# Patient Record
Sex: MALE | Race: WHITE | NOT HISPANIC OR LATINO | Employment: FULL TIME
[De-identification: names, ages, dates, MRNs, and addresses within clinical notes are randomized per-mention and may not be internally consistent; named-entity substitution may affect disease eponyms.]

---

## 2023-06-15 ENCOUNTER — TRANSCRIBE ORDERS (OUTPATIENT)
Dept: OTHER | Age: 48
End: 2023-06-15

## 2023-06-15 ENCOUNTER — TRANSFERRED RECORDS (OUTPATIENT)
Dept: HEALTH INFORMATION MANAGEMENT | Facility: CLINIC | Age: 48
End: 2023-06-15
Payer: COMMERCIAL

## 2023-06-15 ENCOUNTER — MEDICAL CORRESPONDENCE (OUTPATIENT)
Dept: HEALTH INFORMATION MANAGEMENT | Facility: CLINIC | Age: 48
End: 2023-06-15

## 2023-06-15 DIAGNOSIS — H46.9 UNSPECIFIED OPTIC NEURITIS: Primary | ICD-10-CM

## 2023-06-16 ENCOUNTER — TELEPHONE (OUTPATIENT)
Dept: OPHTHALMOLOGY | Facility: CLINIC | Age: 48
End: 2023-06-16

## 2023-06-16 NOTE — TELEPHONE ENCOUNTER
[facility] Call Center    Phone Message    May a detailed message be left on voicemail: yes     Reason for Call: Appointment Intake    Referring Provider Name: Quan Gutierrez MD  Diagnosis and/or Symptoms: Unspecified optic neuritis [H46.9]    Per protocol writer to send te for new patient appointments    Action Taken: Message routed to:  [facility] ([facility] eye    Travel Screening: Not Applicable

## 2023-06-16 NOTE — TELEPHONE ENCOUNTER
Called and LVM     Yony can make an appointment with Dr. Ramon or Dr. Claire for Unspecified optic neuritis     Please update insurance and demographics     Samantha Cabello Communication Facilitator on 6/16/2023 at 11:08 AM

## 2023-07-15 NOTE — PROGRESS NOTES
Yony Loera is a 48 year old male with the following diagnoses:   1. Optic neuropathy    2. Visual field defect    3. Bitemporal hemianopia         Patient was sent for consultation by Dr. Gutierrez for optic neuropathy.     HPI:  Patient states he noticed the central blurring or absent of vision that he noticed at his eye doctor appointment in 1/2023 when he noticed he couldn't read the letter on the left side of the chart with his left eye. Patient denies worsening of symptoms since first noticed in January. Denies eye pain. Patient was referred to for possible  or Macular hole to Dr. Gutierrez in June 2023 who did not believe that patient's symptoms was do to a retina problem, there was no  or Macular hole, or retina related. Referred patient here for possible optic neuropathy. Patient states he had headaches but they were attributed to his sinus congestion. Denies jaw pain, shoulder pain, hip pain, weight loss, or night sweats.  PCP is attempting to get a sinus CT scan to further evaluate for possible sinus infection. Patient also had two episodes of dizziness/vertigo first 10/2022 and 3/2023. He was given meclizine that helped with the second episode of dizziness. No known family hx of ocular dz, no prior eye dz other than needed to wear glasses. No prior ocular surgery. Eats normal balanced diet of meat and vegetables. None smoker. No hx of excessive alcohol intake. Works in [employer] not exposed to any heavy metals in his normal environment.        Independent historians:  Patient    Review of outside testing:  none    My interpretation performed today of outside testing:  None performed         Review of outside clinical notes:    6/15/23 -- Visit with Dr. Gutierrez    Past medical history:  Vertigo         Medications:   Zyrtec   Meclizine       Family history / social history:  Patient's mother -ulcerative colitis, Sister-breast cancer, Father-parkinson's and lewy body disease     Patient denies smoking,  drink's alcohol once weekly at total of 2 beers       Exam:  Visual acuity 20/20 right eye 20/40 left eye.  Color vision Full each eye but difficulty with seeing numbers on the left side.  Pupils no rAPD but sluggish left pupil.  Intraocular pressure wnl each eye.  Anterior segment exam within normal limits for age.  Fundus exam significant for temporal pallor of optic nerve OS.      Tests ordered and interpreted today:  VF: OD superior defect, OS temporal defect that crosses the vertical meridian   OCT RNFL: OD no thinning, OS temporal thinning and nasal GCL loss         Discussion of management / interpretation with another provider:   None    Assessment/Plan:   This patient has optic atrophy LEFT eye.   There is reduced color vision and temporal retinal nerve fiber layer thinning in the LEFT eye.  Interestingly, he has nasal ganglion cell layer  Loss in the RIGHT eye that respects the vertical and diffuse ganglion cell layer  Loss in the LEFT eye. This is suggestive of chiasmal compression most commonly seen in the setting of pituitary adenoma.  I will obtain an MRI.  If this shows a suprasellar lesion then I will refer him to neurosurgery.  If this is normal, then I will obtain bloodwork to look for causes of bilateral optic atrophy.           Attending Physician Attestation:  Complete documentation of historical and exam elements from today's encounter can be found in the full encounter summary report (not reduplicated in this progress note).  I personally obtained the chief complaint(s) and history of present illness.  I confirmed and edited as necessary the review of systems, past medical/surgical history, family history, social history, and examination findings as documented by others; and I examined the patient myself.  I personally reviewed the relevant tests, images, and reports as documented above.  I formulated and edited as necessary the assessment and plan and discussed the findings and management  plan with the patient and family. I personally reviewed the ophthalmic test(s) associated with this encounter, agree with the interpretation(s) as documented by the resident/fellow, and have edited the corresponding report(s) as necessary.  - Ercik Elaine MD   Fellow, Neuro-Ophthalmology

## 2023-07-25 ENCOUNTER — OFFICE VISIT (OUTPATIENT)
Dept: OPHTHALMOLOGY | Facility: CLINIC | Age: 48
End: 2023-07-25
Attending: OPHTHALMOLOGY
Payer: COMMERCIAL

## 2023-07-25 DIAGNOSIS — H53.47 BITEMPORAL HEMIANOPIA: ICD-10-CM

## 2023-07-25 DIAGNOSIS — H53.40 VISUAL FIELD DEFECT: ICD-10-CM

## 2023-07-25 DIAGNOSIS — H46.9 OPTIC NEUROPATHY: Primary | ICD-10-CM

## 2023-07-25 DIAGNOSIS — H53.40 VISUAL FIELD DEFECT: Primary | ICD-10-CM

## 2023-07-25 PROCEDURE — 99205 OFFICE O/P NEW HI 60 MIN: CPT | Mod: GC | Performed by: OPHTHALMOLOGY

## 2023-07-25 PROCEDURE — 92083 EXTENDED VISUAL FIELD XM: CPT | Performed by: OPHTHALMOLOGY

## 2023-07-25 PROCEDURE — 99214 OFFICE O/P EST MOD 30 MIN: CPT | Performed by: OPHTHALMOLOGY

## 2023-07-25 PROCEDURE — 92133 CPTRZD OPH DX IMG PST SGM ON: CPT | Performed by: OPHTHALMOLOGY

## 2023-07-25 RX ORDER — CETIRIZINE HYDROCHLORIDE 10 MG/1
1 TABLET ORAL DAILY PRN
COMMUNITY

## 2023-07-25 ASSESSMENT — REFRACTION_WEARINGRX
OD_AXIS: 137
OD_ADD: +2.00
OD_CYLINDER: +0.75
OS_CYLINDER: +0.50
OD_SPHERE: +2.75
OS_SPHERE: +2.25
OS_AXIS: 022
SPECS_TYPE: PAL
OS_ADD: +2.00

## 2023-07-25 ASSESSMENT — SLIT LAMP EXAM - LIDS
COMMENTS: NORMAL
COMMENTS: NORMAL

## 2023-07-25 ASSESSMENT — CUP TO DISC RATIO
OD_RATIO: 0.3
OS_RATIO: 0.3

## 2023-07-25 ASSESSMENT — CONF VISUAL FIELD
OD_SUPERIOR_NASAL_RESTRICTION: 0
OD_INFERIOR_TEMPORAL_RESTRICTION: 0
OD_NORMAL: 1
OD_SUPERIOR_TEMPORAL_RESTRICTION: 0
OD_INFERIOR_NASAL_RESTRICTION: 0
OS_SUPERIOR_TEMPORAL_RESTRICTION: 2

## 2023-07-25 ASSESSMENT — TONOMETRY
OD_IOP_MMHG: 15
OS_IOP_MMHG: 16
IOP_METHOD: ICARE

## 2023-07-25 ASSESSMENT — VISUAL ACUITY
OS_CC+: -3
OD_CC+: -2
CORRECTION_TYPE: GLASSES
OD_CC: 20/20
OS_CC: 20/40
METHOD: SNELLEN - LINEAR

## 2023-07-25 ASSESSMENT — EXTERNAL EXAM - LEFT EYE: OS_EXAM: NORMAL

## 2023-07-25 ASSESSMENT — EXTERNAL EXAM - RIGHT EYE: OD_EXAM: NORMAL

## 2023-07-25 NOTE — NURSING NOTE
Chief Complaints and History of Present Illnesses   Patient presents with    Optic Neuritis Evaluation     Chief Complaint(s) and History of Present Illness(es)       Optic Neuritis Evaluation               Comments    Pt states vision has been stable since January. No new flashes or floaters.  No eye pain today. No redness or dryness.    SIDRA Vega July 25, 2023 9:04 AM

## 2023-07-25 NOTE — LETTER
2023         RE:  :  MRN: Yony Loera  1975  [id number]     Dear Dr. Gutierrez,    Thank you for asking me to see your very pleasant patient, Yony Loera, in neuro-ophthalmic consultation.  I would like to thank you for sending your records and I have summarized them in the history of present illness.  My assessment and plan are below.  For further details, please see my attached clinic note.      Yony Loera is a 48 year old male with the following diagnoses:   1. Optic neuropathy    2. Visual field defect    3. Bitemporal hemianopia       Patient was sent for consultation by Dr. Gutierrez for optic neuropathy.     HPI: Patient states he noticed the central blurring or absent of vision that he noticed at his eye doctor appointment in 2023 when he noticed he couldn't read the letter on the left side of the chart with his left eye. Patient denies worsening of symptoms since first noticed in January. Denies eye pain. Patient was referred to for possible  or Macular hole to Dr. Gutierrez in 2023 who did not believe that patient's symptoms was do to a retina problem, there was no  or Macular hole, or retina related. Referred patient here for possible optic neuropathy. Patient states he had headaches but they were attributed to his sinus congestion. Denies jaw pain, shoulder pain, hip pain, weight loss, or night sweats.  PCP is attempting to get a sinus CT scan to further evaluate for possible sinus infection. Patient also had two episodes of dizziness/vertigo first 10/2022 and 3/2023. He was given meclizine that helped with the second episode of dizziness. No known family hx of ocular dz, no prior eye dz other than needed to wear glasses. No prior ocular surgery. Eats normal balanced diet of meat and vegetables. None smoker. No hx of excessive alcohol intake. Works in [employer] not exposed to any heavy metals in his normal environment.      Independent historians: Patient    Review of outside  testing: none    My interpretation performed today of outside testing: None performed         Review of outside clinical notes:    6/15/23 -- Visit with Dr. Gutierrez    Past medical history:  Vertigo     Medications:   Zyrtec   Meclizine       Family history / social history:  Patient's mother -ulcerative colitis, Sister-breast cancer, Father-parkinson's and lewy body disease     Patient denies smoking, drink's alcohol once weekly at total of 2 beers     Exam:  Visual acuity 20/20 right eye 20/40 left eye.  Color vision Full each eye but difficulty with seeing numbers on the left side.  Pupils no rAPD but sluggish left pupil.  Intraocular pressure wnl each eye.  Anterior segment exam within normal limits for age.  Fundus exam significant for temporal pallor of optic nerve OS.      Tests ordered and interpreted today:  VF: OD superior defect, OS temporal defect that crosses the vertical meridian   OCT RNFL: OD no thinning, OS temporal thinning and nasal GCL loss         Discussion of management / interpretation with another provider:   None    Assessment/Plan:   This patient has optic atrophy LEFT eye.   There is reduced color vision and temporal retinal nerve fiber layer thinning in the LEFT eye.  Interestingly, he has nasal ganglion cell layer  Loss in the RIGHT eye that respects the vertical and diffuse ganglion cell layer  Loss in the LEFT eye. This is suggestive of chiasmal compression most commonly seen in the setting of pituitary adenoma.  I will obtain an MRI.  If this shows a suprasellar lesion then I will refer him to neurosurgery.  If this is normal, then I will obtain bloodwork to look for causes of bilateral optic atrophy.         Again, thank you for allowing me to participate in the care of your patient.      Sincerely,    Erick Claire MD  Professor  Ophthalmology Residency   Director of Neuro-Ophthalmology  [facility] - [employer] Endowed Chair  Departments of Ophthalmology, Neurology, and  Neurosurgery  [facility] [address] [place]  [address]  T - [phone]  F - [phone]  [email or web address]      CC: Quan Gutierrez MD  [facility]  [address]  [facility] [address]  Via Fax: [phone]    DX = junctional scotoma on ganglion cell layer not visual field

## 2023-08-07 ENCOUNTER — ANCILLARY PROCEDURE (OUTPATIENT)
Dept: MRI IMAGING | Facility: CLINIC | Age: 48
End: 2023-08-07
Attending: OPHTHALMOLOGY
Payer: COMMERCIAL

## 2023-08-07 DIAGNOSIS — H53.40 VISUAL FIELD DEFECT: ICD-10-CM

## 2023-08-07 DIAGNOSIS — H46.9 OPTIC NEUROPATHY: ICD-10-CM

## 2023-08-07 PROCEDURE — 70543 MRI ORBT/FAC/NCK W/O &W/DYE: CPT | Mod: GC | Performed by: RADIOLOGY

## 2023-08-07 PROCEDURE — A9585 GADOBUTROL INJECTION: HCPCS | Mod: JZ | Performed by: RADIOLOGY

## 2023-08-07 RX ORDER — GADOBUTROL 604.72 MG/ML
9.5 INJECTION INTRAVENOUS ONCE
Status: COMPLETED | OUTPATIENT
Start: 2023-08-07 | End: 2023-08-07

## 2023-08-07 RX ADMIN — GADOBUTROL 9.5 ML: 604.72 INJECTION INTRAVENOUS at 17:54

## 2023-08-08 ENCOUNTER — MYC MEDICAL ADVICE (OUTPATIENT)
Dept: OPHTHALMOLOGY | Facility: CLINIC | Age: 48
End: 2023-08-08
Payer: COMMERCIAL

## 2023-08-09 DIAGNOSIS — E23.7 PITUITARY LESION (H): Primary | ICD-10-CM

## 2023-08-09 NOTE — TELEPHONE ENCOUNTER
Spoke to patient.  Dr. Claire has already spoke to patient.   Patient had a few additional questions that I was able to answer for him.  He will let us know if he does not hear from neurosurgery or has additional questions.      Jennifer Wilkins on 8/9/2023 at 2:39 PM

## 2023-08-10 ENCOUNTER — TELEPHONE (OUTPATIENT)
Dept: NEUROSURGERY | Facility: CLINIC | Age: 48
End: 2023-08-10
Payer: COMMERCIAL

## 2023-08-10 NOTE — PROGRESS NOTES
[facility]  Department of Neurosurgery    Name: Yony Loera  MRN: [id number]  : 1975     Reason for visit: Pituitary adenoma, new patient visit      Dear Dr. Castillo and Dr. Claire,    It was a pleasure to see Mr. Loera in the [facility] today as a new patient.  I have reviewed the referral notes and imaging and have summarized our visit here. As you recall, Mr. Loera is a pleasant 48 year-old right-handed male who noticed left peripheral vision loss in the left eye as well as central blurring and an eye doctor appointment in 2023.  He was referred to Dr. Claire for workup of optic neuropathy.  He also reports headaches that were thought to be due to sinus congestion.  Dr. Claire's assessment demonstrated slightly worse visual acuity in the left eye and visual field testing showed a superior defect in the right eye and a temporal defect in the left eye. He reports no thin skin, easy bruising, changes in weight or hair loss, changes in ring or foot size.    Review of Systems:   Pertinent items are noted in HPI or as in patient entered ROS below, remainder of complete ROS is negative.     Medications: cetirizine    Allergies: Patient has no known allergies.      Past Medical History: GERD    Family History: Ulcerative colitis in mother, Parkinson's in father, breast cancer in sister and grandmother    Social History: Works in [employer], lifetime non-smoker,  and one daughter who will be a senior.    Physical Exam:   General: No acute distress.   Head: No signs of trauma.    Eyes: Conjunctivae are normal.  Mouth/Throat: Oropharynx moist.  Neck: Normal range of motion.    Resp: No respiratory distress.   MSK: Moves all extremities.  No obvious deformity.  Neuro: The patient is fully oriented and quite pleasant. Speech normal.  Corrected visual acuity is 20/20 on the right and 20/30 on the left.  Visual fields with  bitemporal hemianopsia.  Extraocular movements are intact without nystagmus. Facial sensation is intact in V1, V2, V3 distributions. Facial nerve function is normal, rated as a House Brackmann 1, without synkinesis.  Palate is symmetric. Shoulders are full strength. Tongue is midline. There is no pronator drift. Full strength in all extremities. Sensation intact throughout. No dysmetria with finger-nose-finger testing.   Psych: Normal mood and affect. Behavior is normal.      Labs:  Pituitary panel  Na 138  Cortisol 9.7 (7:51am)  Prolactin 30 (slight high)  TSH 2.39  FT4 0.79  LH 2.7  FSH 4.8    Imaging:  The MRI of the orbits from 8/7/2023 was reviewed that demonstrates a large lobulated mass in the sphenoid sinus, sella, and suprasellar cistern that measures up to 5.6 cm with mass effect on the optic chiasm and invasion of the bilateral cavernous sinuses.    Assessment:  5.6 cm pituitary adenoma  2.  Left more than right vision loss, bitemporal hemianopsia  3.  Hypothyroidism    Plan:  We reviewed the patient's history, imaging, natural history of pituitary adenomas and expected outcomes of conservative management and intervention. We also discussed the rarer other possibilities for diagnosis. Options include observation, medical management (such as in the case of prolactinomas), radiation, and surgical resection.  We discussed the risks and benefits of each approach as well as the rationale for recommending surgery given the size of the tumor and his visual deficits.  We described the endonasal surgery to resect these tumors, perioperative expectations, and recovery period.    We discussed the risks including bleeding, infection, neurologic injury including vision loss/diplopia/anosmia, carotid injury/stroke, CSF leak, loss or changes in smell and taste, weakness, numbness, subtotal removal, need for additional procedures or operations.  We will refer him to Endocrinology as well  Should he wish to proceed with  surgery, we will refer to our ENT skull base team and PAC  CT/CTA the morning of surgery with fiducials  I encouraged Mr. Loera to contact with any questions or concerns or if we may be of assistance in any way.      It was a pleasure to participate in the care of your patient. Please feel free to contact me at any point if I can be of any assistance for Mr. Loera.    Sincerely,  Sabas Lacy MD       45 minutes spent on the date of the encounter doing chart review, review of outside records, review of test results, interpretation of tests, patient visit, documentation and discussion with other provider(s)

## 2023-08-11 NOTE — TELEPHONE ENCOUNTER
RECORDS RECEIVED FROM: internal   REASON FOR VISIT: Pituitary lesion    Date of Appt: 8/16/23   NOTES (FOR ALL VISITS) STATUS DETAILS   OFFICE NOTE from referring provider Internal Dr Yoel Claire @ [facility]:  7/25/23   OFFICE NOTE from other specialist Received Dr Quan Gutierrez @ [facility]:  6/15/23   MEDICATION LIST Internal    IMAGING  (FOR ALL VISITS)     MRI (HEAD, NECK, SPINE) Internal [facility]:  MRI Orbit 8/7/23

## 2023-08-16 ENCOUNTER — OFFICE VISIT (OUTPATIENT)
Dept: NEUROSURGERY | Facility: CLINIC | Age: 48
End: 2023-08-16
Payer: COMMERCIAL

## 2023-08-16 ENCOUNTER — LAB (OUTPATIENT)
Dept: LAB | Facility: CLINIC | Age: 48
End: 2023-08-16
Payer: COMMERCIAL

## 2023-08-16 ENCOUNTER — MYC MEDICAL ADVICE (OUTPATIENT)
Dept: NEUROSURGERY | Facility: CLINIC | Age: 48
End: 2023-08-16

## 2023-08-16 ENCOUNTER — PRE VISIT (OUTPATIENT)
Dept: NEUROSURGERY | Facility: CLINIC | Age: 48
End: 2023-08-16

## 2023-08-16 ENCOUNTER — PREP FOR PROCEDURE (OUTPATIENT)
Dept: NEUROSURGERY | Facility: CLINIC | Age: 48
End: 2023-08-16

## 2023-08-16 VITALS
DIASTOLIC BLOOD PRESSURE: 84 MMHG | SYSTOLIC BLOOD PRESSURE: 125 MMHG | HEART RATE: 75 BPM | OXYGEN SATURATION: 95 % | RESPIRATION RATE: 16 BRPM

## 2023-08-16 DIAGNOSIS — E23.7 PITUITARY LESION (H): ICD-10-CM

## 2023-08-16 DIAGNOSIS — D35.2 PITUITARY MACROADENOMA (H): Primary | ICD-10-CM

## 2023-08-16 DIAGNOSIS — D35.2 PITUITARY ADENOMA WITH EXTRASELLAR EXTENSION (H): Primary | ICD-10-CM

## 2023-08-16 LAB
ANION GAP SERPL CALCULATED.3IONS-SCNC: 10 MMOL/L (ref 7–15)
BUN SERPL-MCNC: 18.9 MG/DL (ref 6–20)
CALCIUM SERPL-MCNC: 9.9 MG/DL (ref 8.6–10)
CHLORIDE SERPL-SCNC: 103 MMOL/L (ref 98–107)
CORTIS SERPL-MCNC: 9.7 UG/DL
CREAT SERPL-MCNC: 1.07 MG/DL (ref 0.67–1.17)
DEPRECATED HCO3 PLAS-SCNC: 25 MMOL/L (ref 22–29)
FSH SERPL IRP2-ACNC: 4.8 MIU/ML (ref 1.5–12.4)
GFR SERPL CREATININE-BSD FRML MDRD: 85 ML/MIN/1.73M2
GLUCOSE SERPL-MCNC: 106 MG/DL (ref 70–99)
LH SERPL-ACNC: 2.7 MIU/ML (ref 1.7–8.6)
POTASSIUM SERPL-SCNC: 4.2 MMOL/L (ref 3.4–5.3)
PROLACTIN SERPL 3RD IS-MCNC: 30 NG/ML (ref 4–15)
SODIUM SERPL-SCNC: 138 MMOL/L (ref 136–145)
T4 FREE SERPL-MCNC: 0.79 NG/DL (ref 0.9–1.7)
TSH SERPL DL<=0.005 MIU/L-ACNC: 2.39 UIU/ML (ref 0.3–4.2)

## 2023-08-16 PROCEDURE — 84305 ASSAY OF SOMATOMEDIN: CPT | Performed by: NEUROLOGICAL SURGERY

## 2023-08-16 PROCEDURE — 82533 TOTAL CORTISOL: CPT | Performed by: NEUROLOGICAL SURGERY

## 2023-08-16 PROCEDURE — 83001 ASSAY OF GONADOTROPIN (FSH): CPT | Performed by: NEUROLOGICAL SURGERY

## 2023-08-16 PROCEDURE — 80048 BASIC METABOLIC PNL TOTAL CA: CPT | Performed by: PATHOLOGY

## 2023-08-16 PROCEDURE — 99000 SPECIMEN HANDLING OFFICE-LAB: CPT | Performed by: PATHOLOGY

## 2023-08-16 PROCEDURE — 83003 ASSAY GROWTH HORMONE (HGH): CPT | Performed by: NEUROLOGICAL SURGERY

## 2023-08-16 PROCEDURE — 84146 ASSAY OF PROLACTIN: CPT | Performed by: NEUROLOGICAL SURGERY

## 2023-08-16 PROCEDURE — 84439 ASSAY OF FREE THYROXINE: CPT | Performed by: PATHOLOGY

## 2023-08-16 PROCEDURE — 84403 ASSAY OF TOTAL TESTOSTERONE: CPT | Performed by: NEUROLOGICAL SURGERY

## 2023-08-16 PROCEDURE — 84443 ASSAY THYROID STIM HORMONE: CPT | Performed by: PATHOLOGY

## 2023-08-16 PROCEDURE — 36415 COLL VENOUS BLD VENIPUNCTURE: CPT | Performed by: PATHOLOGY

## 2023-08-16 PROCEDURE — 82024 ASSAY OF ACTH: CPT | Performed by: NEUROLOGICAL SURGERY

## 2023-08-16 PROCEDURE — 99204 OFFICE O/P NEW MOD 45 MIN: CPT | Performed by: NEUROLOGICAL SURGERY

## 2023-08-16 PROCEDURE — 83002 ASSAY OF GONADOTROPIN (LH): CPT | Performed by: NEUROLOGICAL SURGERY

## 2023-08-16 RX ORDER — CEFAZOLIN SODIUM 2 G/50ML
2 SOLUTION INTRAVENOUS SEE ADMIN INSTRUCTIONS
Status: CANCELLED | OUTPATIENT
Start: 2023-08-16

## 2023-08-16 RX ORDER — CEFAZOLIN SODIUM 2 G/50ML
2 SOLUTION INTRAVENOUS
Status: CANCELLED | OUTPATIENT
Start: 2023-08-16

## 2023-08-16 ASSESSMENT — PAIN SCALES - GENERAL: PAINLEVEL: NO PAIN (0)

## 2023-08-16 NOTE — LETTER
[facility]  [facility] [address]  [address]RD FLO[address]  [facility] [address]  Phone: [phone]  Fax: [phone]          2023    RE:   Yony Loera  [address] [address] [address]      Dear Colleague,    Thank you for referring your patient, Yony Loera, to the [facility]. Please see a copy of my visit note below.      [facility]  Department of Neurosurgery    Name: Yony Loera  MRN: [id number]  : 1975     Reason for visit: Pituitary adenoma, new patient visit      Dear Dr. Castillo and Dr. Claire,    It was a pleasure to see Mr. Loera in the [facility] today as a new patient.  I have reviewed the referral notes and imaging and have summarized our visit here. As you recall, Mr. Loera is a pleasant 48 year-old right-handed male who noticed left peripheral vision loss in the left eye as well as central blurring and an eye doctor appointment in 2023.  He was referred to Dr. Claire for workup of optic neuropathy.  He also reports headaches that were thought to be due to sinus congestion.  Dr. Claire's assessment demonstrated slightly worse visual acuity in the left eye and visual field testing showed a superior defect in the right eye and a temporal defect in the left eye. He reports no thin skin, easy bruising, changes in weight or hair loss, changes in ring or foot size.    Review of Systems:   Pertinent items are noted in HPI or as in patient entered ROS below, remainder of complete ROS is negative.     Medications: cetirizine    Allergies: Patient has no known allergies.      Past Medical History: GERD    Family History: Ulcerative colitis in mother, Parkinson's in father, breast cancer in sister and grandmother    Social History: Works in , lifetime non-smoker,   and one daughter who will be a senior.    Physical Exam:   General: No acute distress.   Head: No signs of trauma.    Eyes: Conjunctivae are normal.  Mouth/Throat: Oropharynx moist.  Neck: Normal range of motion.    Resp: No respiratory distress.   MSK: Moves all extremities.  No obvious deformity.  Neuro: The patient is fully oriented and quite pleasant. Speech normal.  Corrected visual acuity is 20/20 on the right and 20/30 on the left.  Visual fields with bitemporal hemianopsia.  Extraocular movements are intact without nystagmus. Facial sensation is intact in V1, V2, V3 distributions. Facial nerve function is normal, rated as a House Brackmann 1, without synkinesis.  Palate is symmetric. Shoulders are full strength. Tongue is midline. There is no pronator drift. Full strength in all extremities. Sensation intact throughout. No dysmetria with finger-nose-finger testing.   Psych: Normal mood and affect. Behavior is normal.      Labs:  Pituitary panel  Na 138  Cortisol 9.7 (7:51am)  Prolactin 30 (slight high)  TSH 2.39  FT4 0.79  LH 2.7  FSH 4.8    Imaging:  The MRI of the orbits from 8/7/2023 was reviewed that demonstrates a large lobulated mass in the sphenoid sinus, sella, and suprasellar cistern that measures up to 5.6 cm with mass effect on the optic chiasm and invasion of the bilateral cavernous sinuses.    Assessment:  5.6 cm pituitary adenoma  2.  Left more than right vision loss, bitemporal hemianopsia  3.  Hypothyroidism    Plan:  We reviewed the patient's history, imaging, natural history of pituitary adenomas and expected outcomes of conservative management and intervention. We also discussed the rarer other possibilities for diagnosis. Options include observation, medical management (such as in the case of prolactinomas), radiation, and surgical resection.  We discussed the risks and benefits of each approach as well as the rationale for recommending surgery given the size of the tumor and his visual  deficits.  We described the endonasal surgery to resect these tumors, perioperative expectations, and recovery period.    We discussed the risks including bleeding, infection, neurologic injury including vision loss/diplopia/anosmia, carotid injury/stroke, CSF leak, loss or changes in smell and taste, weakness, numbness, subtotal removal, need for additional procedures or operations.  We will refer him to Endocrinology as well  Should he wish to proceed with surgery, we will refer to our ENT skull base team and PAC  CT/CTA the morning of surgery with fiducials  I encouraged Mr. Loera to contact with any questions or concerns or if we may be of assistance in any way.      It was a pleasure to participate in the care of your patient. Please feel free to contact me at any point if I can be of any assistance for Mr. Loera.    Sincerely,  Sabas Lacy MD       45 minutes spent on the date of the encounter doing chart review, review of outside records, review of test results, interpretation of tests, patient visit, documentation and discussion with other provider(s)          Again, thank you for allowing me to participate in the care of your patient.      Sincerely,    Sabas Lacy MD

## 2023-08-16 NOTE — PATIENT INSTRUCTIONS
You were seen today with Dr. Sabas Lacy. Your primary contact after today's visit is: WIL Blake    Next steps:  Next steps will be determined by lab results - we will contact you.  Referral to endocrinology.      How to Contact Us  Send a Julept message to your provider.   Call the clinic - your call will be routed appropriately.   Neurosurgery Clinic: [phone]  ENT Clinic: [phone]   To speak directly to an RN Care Coordinator:  Lou RN: [phone]    Note: We do our best to check voicemail frequently throughout the day and make every effort to return calls within 1-2 business days. For urgent matters, please use the general clinic phone numbers listed above.       Surgery Instructions    PREPARING FOR SURGERY  You will need a preoperative assessment within 30 days of your surgery. We will set this up for you once we know your surgery date.  Before surgery, call the clinic:   If there is any change in your health, or you are having more frequent or severe symptoms   If you develop a cold or flu, or if you test positive for COVID-19   If you have any questions about what to expect before, during, or after surgery   If you need assistance filling out paperwork for short-term disability or FMLA, or you need a letter excusing you from work       MEDICATIONS BEFORE SURGERY  You will receive specific instructions about how to take your medications at your preoperative assessment visit. Talk to your care team about every medication that you take, including over-the-counter medications and supplements. Some medications can make you bleed too much during surgery, and some change how well anesthesia drugs work. Follow these general instructions for common medications, unless otherwise directed.     INSTRUCTIONS MEDICATION   Hold for 7 days before surgery  Supplements   Multivitamins   Aspirin (note: some medications can contain aspirin, like Lyn-Rangely)  Naproxen (Aleve)  Ibuprofen (Advil, Motrin)      Talk with the Preoperative Assessment Center (PAC) about how to take these medications before surgery    Insulin or oral medications for diabetes   Blood pressure medications   Anticoagulant medications, including:    warfarin (Coumadin)   enoxaparin (Lovenox)   dabigatran (Pradaxa)   apixaban (Eliquis)   rivaroxaban (Xarelto)   Antiplatelet medications, including:   clopidogrel bisulfate (Plavix)   cilostazol (Pletal)      Okay to keep taking for pain, as needed    Acetaminophen (Tylenol)        EATING AND DRINKING BEFORE SURGERY  Eat and drink as usual until 8 hours before your surgery arrival time. After that, no food or milk.   Drink clear liquids until 1 hour before your surgery arrival time. Clear liquids are liquids you can see through, like water, Gatorade, and Propel. You may also have black coffee and tea (no cream or milk).   Nothing by mouth within 1 hour of your surgery arrival time. This includes gum, candy, and breath mints.   If your care team tells you take medicine on the morning of surgery, it is okay to take medicine with a sip of water.   Do not drink alcohol for at least 24 hours before surgery. Do not use marijuana for 7 days prior to surgery.      PREVENTING INFECTION  Shower or bathe the night before and morning of your surgery following the instructions on your handout,  Showering Before Surgery.    Note: Only use the special antiseptic soap from your neck to your toes. Your care team will clean the skin at your surgery site.   Don t shave or clip hair near your surgery site. We ll remove the hair if needed.   Having diabetes and/or smoking can cause delayed wound healing and increase your risk of a surgical site infection. Quitting smoking and lowering your blood sugars can make a big difference. If you would like support with this, please let us know or work with your primary care provider.        SMOKING AND NICOTINE  Don t smoke or vape the morning of surgery. You may chew nicotine  "gum up to 2 hours before surgery. A nicotine patch is okay.   We strongly recommend quitting smoking and other tobacco products. Nicotine can cause delayed healing and wound complications after surgery.       PLANNING AHEAD - FINANCES  [email or web address]  [facility] Billing: Please call [phone], if you wish to pay a bill.  [facility] Financial Counselors: Please call [phone], if you have questions about possible costs and coverage or to discuss options if you don't have enough - or no - insurance for your care.  [facility] Cost of Care Estimates: Please visit the website to view our online estimate tool. If you have additional questions, call [phone] for estimates.  Our financial team will contact your insurance company as soon as surgery is scheduled. If your insurance company requires a prior authorization (pre-approval), our financial team will complete these necessary steps. Typically, we don t encounter many issues with insurance denying coverage for skull base surgery.    It is a good idea to call your insurance company and let them know you re having surgery. Ask questions about your deductible, co-insurance, and what of out-of-pocket costs you will be responsible for.   [employer] Clinical Services: You might hear about a company called [employer], with whom we contract to provide monitoring of your nerves during surgery (called \"intraoperative neuromonitoring\"). You may receive a letter from [employer] after your surgery, and this company and letter are legitimate. For questions and more information, please visit: [email or web address]      COVID-19 AND VISITOR RESTRICTIONS  Please visit [email or web address] for the latest information about hospital visitor restrictions.      HEALTHCARE DIRECTIVE  Consider preparing a Health Care " "Directive and choosing a Health Care Agent. A Health Care Directive is a written plan outlining your values and priorities for your future medical treatment. A Health Care Agent makes health care decisions based on your wishes if you are unable to communicate.   Download a document, information materials or register for a free class on advance care planning and creating a health care directive by visiting [email or web address], calling [phone], or emailing [email or web address].   If you have a health care directive or choose to complete a healthcare directive before surgery, please upload the document to [employer]. This will be attached to your chart. You may also want to bring a copy with you on the day of surgery.       YOUR SURGERY DAY  Parking:   Both self-park and  parking (24 hours, 7 days a week) are available at the [facility]. Self-park and  rates are the same. If the Patient Visitors Ramp is full or if a patient or visitor has limited mobility, please follow the signs to the  located at the main entrance. For more information, please visit: [email or web address]    Due to safety concerns around COVID-19, [facility] cannot offer  services to all patients at this time. However, we do still offer  services for patients with mobility limitations.   Imaging:  Your surgeons may want to do a CT scan on the day of surgery. This is most common with endoscopic endonasal surgery (through your nose). Sometimes, you will need to have small stickers or markers, called fiducials, placed on your head for the scan. This gives your surgeons a \"map\" of the surgical area, and helps your surgeons navigate around important structures during the surgery. If you will need fiducials, you will have small areas of hair shaved so the markers will stick to your head. These markers are temporary, and will come off after your surgery.  What to " bring:  Photo ID and insurance card   Copy of your healthcare directive (if you have one)   Glasses with case (you can t wear contacts during surgery)   Hearing aids with case   A few personal items (pack lightly)   Cell phone and    Inhaler (if you use)   Eye drops (if you use)   If you have a pacemaker, ICD (defibrillator) or other implant, please bring the ID card   If you have an implanted stimulator, please bring the remote control   If you have a legal guardian, bring a copy of the certified (court-stamped) guardianship papers   What to leave at home:  Please remove any jewelry, including body piercings   Leave jewelry and other valuables at home       HOSPITAL STAY  On average, your hospital stay will be between 2-3 days. It could be shorter or longer depending on your specific procedure, your health, and your recovery. The first 24 hours of your hospital stay will typically be in the Intensive Care Unit (ICU). You will be monitored closely and may have some of the following: Intravenous (IV) fluids and medications, a catheter which drains your urine, or a lumbar drain (a small catheter in your low back).  After the ICU, you will be moved to a regular hospital bed/floor. The rest of your post-operative recovery will focus on your individual needs which may include: helping with balance, nausea, swallowing, bowel management, pain, and incision care.       HOME RECOVERY  Everyone's recovery is different based on their health condition, age, and overall health status.   Plan to have an adult stay with you for at least 24 hours after you get home from the hospital.   Arrange for help at home. It is common to feel tired for the first few weeks (maybe months) and you will need to avoid some activities. Many people find that having someone help with household tasks, such as cleaning, cooking, and running errands is helpful.    Make your home safe by removing tripping hazards and moving items you need to a  place where you can easily reach them.        ACTIVITY RESTRICTIONS  Avoid strenuous exercise and activity for 6-12 weeks.   Do not lift anything greater than 10 pounds (about a gallon of milk).  Extra pressure in your head can disrupt the healing of the internal surgical area and cause complications. Avoid the following activities that may increase the pressure in your head:  Avoid bending over with your head below your heart  Avoid heavy lifting or straining  Avoid lifting with your arms above your shoulders  Take care to prevent constipation, as this can lead to straining  Avoid drinking through straws  Avoid blowing your nose  Try to cough and sneeze with your mouth open      SLEEPING  Plan to sleep with your head elevated (at least 30 degrees) for at least 2 weeks after surgery. You may find that sleeping in a recliner is helpful and more comfortable. This is especially important if you have sleep apnea and are unable to use your CPAP after surgery.  Please let your care team know if you have sleep apnea. You will not be able to use your CPAP for at least 2 weeks after surgery, sometimes longer. We will monitor your breathing closely while you are in the hospital.      INCISION CARE  Depending on your surgery, you might also have an incision on your upper leg / thigh or your abdomen. Sometimes, these incisions are covered with small strips of tape, called steri-strips. If you have steri-strips, allow these to peel off on their own.  If you had a lumbar drain during surgery, you will have a small, dissolvable suture in your low back.       PAIN MANAGEMENT  It is normal to have some pain after surgery. Most people do not experience severe pain. If you are experiencing severe pain at the incision site or severe headaches, please let us know right away.  You will usually be sent home with a small amount of narcotic pain medication. You should gradually reduce the amount of pain medication that you take as your pain  improves.  Avoid ibuprofen (Advil) or naproxen (Aleve) immediately after surgery, unless your surgeon tells you this is okay to take.   It is okay to take acetaminophen (Tylenol) for pain. You can take Tylenol with the narcotic pain medication, and some people find benefit in alternating between the narcotic pain medication and Tylenol. (Example: 1 tablet of oxycodone at 8:00 am, 1 tablet of Tylenol at 10:00 am).      DIET  A well-balanced diet is important for your recovery.   Try to eat plenty of fiber (fruits, whole grains, beans, and vegetables can be good sources of fiber) to help prevent constipation.   It is important to stay hydrated after surgery to prevent dehydration and help with bowel movements. Drink plenty of water throughout the day.      WORK AND FMLA / SHORT TERM DISABILITY  Most people take 6-12 weeks off work. This will depend on the type of work that you do and the surgery you are having.  We are happy to complete short term disability or FMLA paperwork for you. Please send a Fundera message (you can attach a document) with the paperwork that you need completed. Please let us know where you'd like for us to send the paperwork. We can send it directly to your leave  or employer, with your permission, or we can send it back to you.  Please allow at least 5-7 business days for paperwork completion and processing.       CALL THE CLINIC IF YOU EXPERIENCE:  Drainage, swelling, fluid collection, or increased redness around the incision   Fever, or temperature of 101 degrees or higher   Stiff neck or extra sensitivity to light   Clear nasal drainage which you did not have before surgery, or a new salty/metallic taste  Increase in facial weakness   Vision changes  Pain not managed by your pain medication   Severe constipation or abdominal pain  Running low on prescription medication that was prescribed to you at the time of surgery   Any other symptoms that you have questions about    After  clinic hours or on the weekends, please call the hospital  at [phone] and ask to speak with the Neurosurgery or ENT resident on call. If you have a serious emergency, call 911 or come to the emergency room. If you can, come to the hospital where you had your surgery.     During clinic hours:   Department  Phone    Ear, Nose, & Throat (ENT)    [phone]    Neurosurgery    [phone]     Skull Base RN Care Coordinators:  We do our best to check voicemail frequently throughout the day. For urgent matters, please use the general clinic phone numbers listed above. Your call will be routed appropriately.     Lou Menendez MA, RN, PHN, [facility]   RN Care Coordinator & Skull Base    Direct: [phone]      Carol Echols, WIL   ENT - Dr. Farrar, Dr. Ho, Dr. Duron   Direct: [phone]

## 2023-08-16 NOTE — Clinical Note
2023       RE: Yony Loera  [address] [facility] [address]     Dear Colleague,    Thank you for referring your patient, Yony Loera, to the [facility]UROS[facility]ERY [facility] [place] at [facility]. Please see a copy of my visit note below.    [facility]  Department of Neurosurgery    Name: Yony Loera  MRN: [id number]  : 1975     Reason for visit: Pituitary adenoma, new patient visit      Dear Dr. Castillo and Dr. Claire,    It was a pleasure to see Mr. Loera in the [facility] today as a new patient.  I have reviewed the referral notes and imaging and have summarized our visit here. As you recall, Mr. Loera is a pleasant 48 year-old right-handed male who noticed left peripheral vision loss in the left eye as well as central blurring and an eye doctor appointment in 2023.  He was referred to Dr. Claire for workup of optic neuropathy.  He also reports headaches that were thought to be due to sinus congestion.  Dr. Claire's assessment demonstrated slightly worse visual acuity in the left eye and visual field testing showed a superior defect in the right eye and a temporal defect in the left eye. He reports no thin skin, easy bruising, changes in weight or hair loss, changes in ring or foot size.    Review of Systems:   Pertinent items are noted in HPI or as in patient entered ROS below, remainder of complete ROS is negative.     Medications: cetirizine    Allergies: Patient has no known allergies.      Past Medical History: GERD    Family History: Ulcerative colitis in mother, Parkinson's in father, breast cancer in sister and grandmother    Social History: Works in , lifetime non-smoker,  and one daughter who will be a senior.    Physical Exam:   General: No acute distress.   Head: No signs of trauma.    Eyes: Conjunctivae are  normal.  Mouth/Throat: Oropharynx moist.  Neck: Normal range of motion.    Resp: No respiratory distress.   MSK: Moves all extremities.  No obvious deformity.  Neuro: The patient is fully oriented and quite pleasant. Speech normal.  Corrected visual acuity is 20/20 on the right and 20/30 on the left.  Visual fields with bitemporal hemianopsia.  Extraocular movements are intact without nystagmus. Facial sensation is intact in V1, V2, V3 distributions. Facial nerve function is normal, rated as a House Brackmann 1, without synkinesis.  Palate is symmetric. Shoulders are full strength. Tongue is midline. There is no pronator drift. Full strength in all extremities. Sensation intact throughout. No dysmetria with finger-nose-finger testing.   Psych: Normal mood and affect. Behavior is normal.      Labs:  Pituitary panel  Cortisol  Prolactin  TSH  FT4  LH  FSH  ***    Imaging:  The MRI of the orbits from 8/7/2023 was reviewed that demonstrates a large lobulated mass in the sphenoid sinus, sella, and suprasellar cistern that measures up to 5.6 cm with mass effect on the optic chiasm and invasion of the bilateral cavernous sinuses.    Assessment:  5.6 cm pituitary adenoma  2.  Left more than right vision loss  3.***    Plan:  We reviewed the patient's history, imaging, natural history of pituitary adenomas and expected outcomes of conservative management and intervention. We also discussed the rarer other possibilities for diagnosis. Options include observation, medical management (such as in the case of prolactinomas), radiation, and surgical resection.  We discussed the risks and benefits of each approach as well as the rationale for recommending surgery given the size of the tumor and his visual deficits.  We described the endonasal surgery to resect these tumors, perioperative expectations, and recovery period.    We discussed the risks including bleeding, infection, neurologic injury including vision  loss/diplopia/anosmia, carotid injury/stroke, CSF leak, loss or changes in smell and taste, weakness, numbness, subtotal removal, need for additional procedures or operations.  We will refer him to Endocrinology as well  He would like to***  I encouraged Mr. Loera to contact with any questions or concerns or if we may be of assistance in any way.      It was a pleasure to participate in the care of your patient. Please feel free to contact me at any point if I can be of any assistance for Mr. Loera.    Sincerely,  Sabas Lacy MD       *** minutes spent on the date of the encounter doing chart review, review of outside records, review of test results, interpretation of tests, patient visit, documentation and discussion with other provider(s)          Again, thank you for allowing me to participate in the care of your patient.      Sincerely,    Sabas Lacy MD

## 2023-08-16 NOTE — NURSING NOTE
Met with pt and his wife, provided written pre/susan/postop instructions via AVS and informed them we would contact them with lab results and next steps. If they proceed with surgery, will mail surgery folder and antiseptic soap.

## 2023-08-17 ENCOUNTER — TELEPHONE (OUTPATIENT)
Dept: ENDOCRINOLOGY | Facility: CLINIC | Age: 48
End: 2023-08-17

## 2023-08-17 LAB
ACTH PLAS-MCNC: 26 PG/ML
GH SERPL-MCNC: 0.1 UG/L

## 2023-08-17 NOTE — TELEPHONE ENCOUNTER
----- Message from Anne-Marie Stern, RN sent at 8/17/2023  1:28 PM CDT -----  Regarding: FW: Referral - pituitary adenoma  Could you please call patient and offer 4:30pm overbook virtual appointment with Dr. Mcgrath tomorrow?    Thank you!  Elizabeth        ----- Message -----  From: Anne-Marie Stern RN  Sent: 8/17/2023  10:18 AM CDT  To: [facility] Endocrinology Adult [place]  Subject: FW: Referral - pituitary adenoma                   ----- Message -----  From: Delilah Mcgrath MD  Sent: 8/16/2023  10:08 PM CDT  To: Lou Menendez RN; Anne-Marie Stern RN  Subject: RE: Referral - pituitary adenoma                 Thank you, Lou!    Elizabeth,  Could you offer 8/25 Friday MG ?     Thank you    Delilah  ----- Message -----  From: Lou Menendez RN  Sent: 8/16/2023   2:01 PM CDT  To: Delilah Mcgrath MD; [id number]  Subject: Referral - pituitary adenoma                     Hi Dr. Mcgrath,    We met this very pleasant patient in clinic today with a 5.6 cm pituitary adenoma. Labs are still resulting, but prolactin came back at 30, so Dr. Lacy has offered him surgery.     We placed a referral to you. Could your team reach out to him to schedule? I'm guessing he would be comfortable with a video visit, if that helps with your availability.    Thank you!!

## 2023-08-17 NOTE — TELEPHONE ENCOUNTER
left voice mail asking Patient if 8/18/2023 @ 4:30 would be ok for a virtual visit with Dr. Mcgrath.       8/18/2023 @ 4:30 pm via Video       Medina Oliver [facility]  Adult Endocrinology  [facility]

## 2023-08-18 LAB
IGF-I BLD-MCNC: 182 NG/ML (ref 67–225)
TESTOST SERPL-MCNC: 192 NG/DL (ref 240–950)

## 2023-08-18 NOTE — TELEPHONE ENCOUNTER
Left message offering 8/18/23 at 4:30 pm. Asked patient to call back.     Nani Perez CMA  Adult Endocrinology  [facility] [place]

## 2023-08-21 NOTE — TELEPHONE ENCOUNTER
Patient returned call on 8/21/23, we scheduled next available on 11/17/23 and added to wait list. Referred by Sabas Lacy MD for Pituitary adenoma with extrasellar extension (H) [D35.2]. Referral requesting Dr. Mcgrath.

## 2023-08-23 NOTE — TELEPHONE ENCOUNTER
Left message for the patient offering add on to 4:15pm on 8/25/23 per Dr. Mcgrath, will also send the patient a [employer] message.    Nani Perez, [facility]  Adult Endocrinology  [facility] [place]

## 2023-08-23 NOTE — TELEPHONE ENCOUNTER
Pt is scheduled for Friday 8/25/2023 @ 4:15 pm for virtual visit.    No further action is needed.       Medina Oliver, LUISANA  Adult Endocrinology  [facility]

## 2023-08-25 ENCOUNTER — VIRTUAL VISIT (OUTPATIENT)
Dept: ENDOCRINOLOGY | Facility: CLINIC | Age: 48
End: 2023-08-25
Attending: NEUROLOGICAL SURGERY
Payer: COMMERCIAL

## 2023-08-25 VITALS — BODY MASS INDEX: 30.1 KG/M2 | WEIGHT: 215 LBS | HEIGHT: 71 IN

## 2023-08-25 DIAGNOSIS — E23.0 HYPOPITUITARISM (H): ICD-10-CM

## 2023-08-25 DIAGNOSIS — D35.2 PITUITARY ADENOMA WITH EXTRASELLAR EXTENSION (H): Primary | ICD-10-CM

## 2023-08-25 PROCEDURE — 99245 OFF/OP CONSLTJ NEW/EST HI 55: CPT | Mod: VID | Performed by: INTERNAL MEDICINE

## 2023-08-25 RX ORDER — LEVOTHYROXINE SODIUM 75 UG/1
75 TABLET ORAL DAILY
Qty: 90 TABLET | Refills: 3 | Status: SHIPPED | OUTPATIENT
Start: 2023-08-25 | End: 2024-09-09

## 2023-08-25 ASSESSMENT — ENCOUNTER SYMPTOMS
RECTAL PAIN: 0
SINUS CONGESTION: 1
HOARSE VOICE: 0
PARALYSIS: 0
SORE THROAT: 0
SEIZURES: 0
BLOATING: 0
JAUNDICE: 0
TROUBLE SWALLOWING: 0
SMELL DISTURBANCE: 0
DIZZINESS: 0
NUMBNESS: 0
NECK MASS: 0
ABDOMINAL PAIN: 0
DISTURBANCES IN COORDINATION: 0
CONSTIPATION: 0
HEADACHES: 1
SINUS PAIN: 0
BLOOD IN STOOL: 0
TINGLING: 0
TASTE DISTURBANCE: 0
LOSS OF CONSCIOUSNESS: 0
MEMORY LOSS: 0
HEARTBURN: 1
BOWEL INCONTINENCE: 0
NAUSEA: 0
WEAKNESS: 0
TREMORS: 0
DIARRHEA: 0
VOMITING: 0
SPEECH CHANGE: 0

## 2023-08-25 ASSESSMENT — PAIN SCALES - GENERAL: PAINLEVEL: NO PAIN (0)

## 2023-08-25 NOTE — LETTER
8/25/2023         RE: Yony Loera  [id number] [place] [address]        Dear Colleague,    Thank you for referring your patient, Yony Loera, to the [facility]. Please see a copy of my visit note below.                                                                               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   Referral from Dr. Lacy.   In January 2023 he went to the eye exam and noticed that he is missing letters and blurry vision therefore he was referred to retinal specialist in June 2023 and then he was referred to neuro-ophthalmologist Dr. Claire.  Brain MRI was done and found a large lobulated lesion with 5.6 x 4.3 x 4.3 cm.  He was referred to neurosurgeon Dr. Lacy and planned for the surgery.   He is endocrine hormone level was assessed 1 week ago on August 16, 2023 showed free T4 mildly low 0.79 with TSH 2.3, prolactin 30 and FSH 4.8, with total testosterone 192.  His IGF-I normal range 182 and his cortisol was 9.7 and ACTH 26.  Body weight 215 pounds and height 5 feet and 11 inches.  Denied loss of energy, no headache he sleeps okay, eating okay.   Other medical history including sinusitis with occasionally taking Zyrtec . he walks as a .     Past Medical/Surgical History:  No past medical history on file.  No past surgical history on file.    Allergies:  No Known Allergies    Current Medications   Current Outpatient Medications   Medication     cetirizine (ZYRTEC) 10 MG tablet     No current facility-administered medications for this visit.       Family History:  Family History   Problem Relation Age of Onset     Glaucoma No family hx of      Macular Degeneration No family hx of        Social History:  Social History     Tobacco Use      Smoking status: Never     Smokeless tobacco: Never   Substance Use Topics     Alcohol use: Not on file       ROS:  Full review of systems taken with the help of the intake sheet. Otherwise a complete 14 point review of systems was taken and is negative unless stated in the history above.      Physical Exam:   Vitals: There were no vitals taken for this visit.  BMI= There is no height or weight on file to calculate BMI.   General: well appearing, no acute distress, pleasant and conversant,   Mental Status/neuro: alert and oriented  Face: symmetrical, normal facial color  Eyes: anicteric, no proptosis or lid lag  Resp: normally breathing      Labs : I reviewed data from epic and extract and summarize the pertinent data here.   Lab Results   Component Value Date     08/16/2023      Lab Results   Component Value Date    POTASSIUM 4.2 08/16/2023     Lab Results   Component Value Date    CHLORIDE 103 08/16/2023     Lab Results   Component Value Date    SOURAV 9.9 08/16/2023     Lab Results   Component Value Date    CO2 25 08/16/2023     Lab Results   Component Value Date    BUN 18.9 08/16/2023     Lab Results   Component Value Date    CR 1.07 08/16/2023     Lab Results   Component Value Date     08/16/2023     Lab Results   Component Value Date    TSH 2.39 08/16/2023     Lab Results   Component Value Date    T4 0.79 08/16/2023     No results found for: A1C    No results found for: IGF1  No results found for: LH  Lab Results   Component Value Date    FSH 4.8 08/16/2023     No results found for: ESTROGEN  No results found for: PROLACTIN        MRI Brain: I personally reviewed the original images and agree with the below reports.         Large lobulated T2 FLAIR intermediate (somewhat similar to gray matter  on T1 and T2) and heterogeneously enhancing expansile mass centered on  the nonvisualized sella measuring 5.6 x 4.3 x 4.3 cm. Areas of marked  T2 hyperintense signal may indicated cystic or necrotic  component.  This superiorly extends into suprasellar cistern and widens diaphragma  sella. There is associated diffusion restriction. Nonvisualization of  the pituitary gland and stalk. Marked mass effect upon the optic  chiasm which appears elevated.  This inferiorly this extends into the nasopharynx and involves the  majority of clivus stopping at the junction of the sphenoid bone with  the anterior occipital bone.  This anteriorly extends through the  sphenoid locules and into the posterior ethmoidal cells. Posteriorly  this extends into the prepontine cistern which is partially effaced.  There is invasion of the bilateral cavernous sinuses (best seen on  image 7 of series 11). No evidence of encasement of either cavernous  segment of the bilateral internal carotid arteries.     Regarding the orbits, no definite mass is noted of the globe, conal  structures, or within the orbital fat on either side. 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        Delilah Mcgrath MD  Staff Physician  Endocrinology and Metabolism  License: [id number]       Again, thank you for allowing me to participate in the care of your patient.        Sincerely,        Delilah Mcgrath MD

## 2023-08-25 NOTE — PROGRESS NOTES
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   Referral from Dr. Lacy.   In January 2023 he went to the eye exam and noticed that he is missing letters and blurry vision therefore he was referred to retinal specialist in June 2023 and then he was referred to neuro-ophthalmologist Dr. Claire.  Brain MRI was done and found a large lobulated lesion with 5.6 x 4.3 x 4.3 cm.  He was referred to neurosurgeon Dr. Lacy and planned for the surgery.   He is endocrine hormone level was assessed 1 week ago on August 16, 2023 showed free T4 mildly low 0.79 with TSH 2.3, prolactin 30 and FSH 4.8, with total testosterone 192.  His IGF-I normal range 182 and his cortisol was 9.7 and ACTH 26.  Body weight 215 pounds and height 5 feet and 11 inches.  Denied loss of energy, no headache he sleeps okay, eating okay.   Other medical history including sinusitis with occasionally taking Zyrtec . he walks as a .     Past Medical/Surgical History:  No past medical history on file.  No past surgical history on file.    Allergies:  No Known Allergies    Current Medications   Current Outpatient Medications   Medication    cetirizine (ZYRTEC) 10 MG tablet     No current facility-administered medications for this visit.       Family History:  Family History   Problem Relation Age of Onset    Glaucoma No family hx of     Macular Degeneration No family hx of        Social History:  Social History     Tobacco Use    Smoking status: Never    Smokeless tobacco: Never   Substance Use Topics    Alcohol use: Not on file       ROS:  Full review of systems taken with the help of the intake sheet. Otherwise a complete 14 point review of systems was taken and is negative unless stated  in the history above.      Physical Exam:   Vitals: There were no vitals taken for this visit.  BMI= There is no height or weight on file to calculate BMI.   General: well appearing, no acute distress, pleasant and conversant,   Mental Status/neuro: alert and oriented  Face: symmetrical, normal facial color  Eyes: anicteric, no proptosis or lid lag  Resp: normally breathing      Labs : I reviewed data from epic and extract and summarize the pertinent data here.   Lab Results   Component Value Date     08/16/2023      Lab Results   Component Value Date    POTASSIUM 4.2 08/16/2023     Lab Results   Component Value Date    CHLORIDE 103 08/16/2023     Lab Results   Component Value Date    SOURAV 9.9 08/16/2023     Lab Results   Component Value Date    CO2 25 08/16/2023     Lab Results   Component Value Date    BUN 18.9 08/16/2023     Lab Results   Component Value Date    CR 1.07 08/16/2023     Lab Results   Component Value Date     08/16/2023     Lab Results   Component Value Date    TSH 2.39 08/16/2023     Lab Results   Component Value Date    T4 0.79 08/16/2023     No results found for: A1C    No results found for: IGF1  No results found for: LH  Lab Results   Component Value Date    FSH 4.8 08/16/2023     No results found for: ESTROGEN  No results found for: PROLACTIN        MRI Brain: I personally reviewed the original images and agree with the below reports.         Large lobulated T2 FLAIR intermediate (somewhat similar to gray matter  on T1 and T2) and heterogeneously enhancing expansile mass centered on  the nonvisualized sella measuring 5.6 x 4.3 x 4.3 cm. Areas of marked  T2 hyperintense signal may indicated cystic or necrotic component.  This superiorly extends into suprasellar cistern and widens diaphragma  sella. There is associated diffusion restriction. Nonvisualization of  the pituitary gland and stalk. Marked mass effect upon the optic  chiasm which appears elevated.  This inferiorly this  extends into the nasopharynx and involves the  majority of clivus stopping at the junction of the sphenoid bone with  the anterior occipital bone.  This anteriorly extends through the  sphenoid locules and into the posterior ethmoidal cells. Posteriorly  this extends into the prepontine cistern which is partially effaced.  There is invasion of the bilateral cavernous sinuses (best seen on  image 7 of series 11). No evidence of encasement of either cavernous  segment of the bilateral internal carotid arteries.     Regarding the orbits, no definite mass is noted of the globe, conal  structures, or within the orbital fat on either side. 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        Delilah Mcgrath MD  Staff Physician  Endocrinology and Metabolism  License: [id number]

## 2023-08-25 NOTE — NURSING NOTE
Is the patient currently in the state of [place]? YES    Visit mode:VIDEO    If the visit is dropped, the patient can be reconnected by: VIDEO VISIT: Text to cell phone:   Telephone Information:   Mobile [phone]       Will anyone else be joining the visit? NO  (If patient encounters technical issues they should call [phone])    How would you like to obtain your AVS? MyChart    Are changes needed to the allergy or medication list? Pt stated no changes to allergies and Pt stated no med changes    Reason for visit: Consult    Cherelle GALAVIZ

## 2023-08-25 NOTE — PROGRESS NOTES
"Virtual Visit Details    Type of service:  Video Visit     Originating Location (pt. Location): {video visit patient location:452689::\"Home\"}  {PROVIDER LOCATION On-site should be selected for visits conducted from your clinic location or adjoining [facility] hospital, academic office, or other nearby [facility] building. Off-site should be selected for all other provider locations, including home:667812}  Distant Location (provider location):  {virtual location provider:452301}  Platform used for Video Visit: {Virtual Visit Platforms:568082::\"SantoSolve\"}    " Clinimix/ TPN orders renewed for today, same formula and at the same rate.   Lipid emulsion to be given every at 22:00  (per P&T approved pharmacy protocol).

## 2023-08-29 ENCOUNTER — TELEPHONE (OUTPATIENT)
Dept: OTOLARYNGOLOGY | Facility: CLINIC | Age: 48
End: 2023-08-29
Payer: COMMERCIAL

## 2023-08-29 NOTE — TELEPHONE ENCOUNTER
Patient was contacted & scheduled surgery with Dr. Lacy/Dr. Ho on 10/19    Surgery is located at [place] [facility]    Patient will be seen for their H&P by:    PAC on 10/12 at 130pm - patient was provided address & floor number. Advised patient that they will receive their start time for surgery at this appointment - also scheduled appointment with Dr. Ho 10/12 at 3pm on the 4th floor. Patient is aware.    Anesthesia type: General      Requested Imaging required for surgery: CT/CTA AM of surgery    Patient is scheduled for their 2 week post op with Dr. Ho on 11/2 at 220pm and Margaux Glover on 11/3 1030am virtual    Patient will receive their packet via EDAN per their preference    Additional comments: GAGANDEEP Hinton on 8/29/2023 at 3:02 PM

## 2023-08-30 NOTE — TELEPHONE ENCOUNTER
FUTURE VISIT INFORMATION      SURGERY INFORMATION:  Date: 10/19/23  [facility]  Surgeon:  Sabas Lacy MD   Anesthesia Type:  general  Procedure: stealth assisted Endoscopic endonasal transsellar approach for tumor, nasoseptal flap possible lumbar drain possible fascia jett graft, possible abdominal fat graft     RECORDS REQUESTED FROM:       Primary Care Provider: Gurmeet Castillo MD  - [employer]

## 2023-08-30 NOTE — TELEPHONE ENCOUNTER
Patient called back and accepted 10/27 as their new surgery date. Advised patient that he does not need to move his PAC appointment as it is within 30 days of surgery. Patient understood and had no further questions    Meche Hinton, Perioperative Coordinator 8/30/2023 at 11:13 AM

## 2023-08-30 NOTE — TELEPHONE ENCOUNTER
Patient called in to see when the next available would be for his surgery. Advised patient that I would look into it and call him back    Meche Hinton, Perioperative Coordinator 8/30/2023 at 10:30 AM

## 2023-08-30 NOTE — TELEPHONE ENCOUNTER
FUTURE VISIT INFORMATION:      FUTURE VISIT INFORMATION:  Date: 10/12/23  Time: 3pm  Location: [facility]  REFERRAL INFORMATION:  Referring provider:   Referring providers clinic:   Reason for visit/diagnosis:  discuss surgery     RECORDS REQUESTED FROM:       Clinic name Comments Records Status Imaging Status   [facility] Neurosurgery [place] 8/26/23 note- Sabas Lacy MD     10/19/2023 Procedure scheduled Epic    Imaging MR Orbit 8/7/23 [facility] PACS

## 2023-08-30 NOTE — TELEPHONE ENCOUNTER
Called patient and offered 10/27 - patient said he is going to talk with his family and then call us back    Meche Hinton, Perioperative Coordinator 8/30/2023 at 11:02 AM

## 2023-08-31 NOTE — TELEPHONE ENCOUNTER
Patient rescheduled surgery to a later date in October. Called patient to discuss. Left voicemail asking for call back.

## 2023-09-05 NOTE — TELEPHONE ENCOUNTER
FUTURE VISIT INFORMATION:        FUTURE VISIT INFORMATION:  Date: 9/14/23  Time: 12:40 pm  [place] [facility] [place] INFORMATION:  Referring provider:   Referring providers clinic:   Reason for visit/diagnosis:  discuss surgery      RECORDS REQUESTED FROM:         Clinic name Comments Records Status Imaging Status   [facility] Neurosurgery [place] 8/26/23 note- Sabas Lacy MD      9/21/2023 Procedure scheduled Epic     Imaging MR Orbit 8/7/23 [facility] PACS

## 2023-09-05 NOTE — TELEPHONE ENCOUNTER
Left patient a voicemail to reschedule surgery for 9/21    Meche Hinton, Perioperative Coordinator 9/5/2023 at 12:53 PM

## 2023-09-06 NOTE — PATIENT INSTRUCTIONS
1. Please follow-up in clinic in 10/5/23 at 4:00 pm   2. Please call the ENT clinic with any questions,concerns, new or worsening symptoms.    -Clinic number is [phone]   - Carol's direct line (Dr. Ho's nurse) [phone]       EATING AND DRINKING BEFORE SURGERY  Eat and drink as usual until 8 hours before your surgery arrival time. After that, no food or milk.   Drink clear liquids until 1 hour before your surgery arrival time. Clear liquids are liquids you can see through, like water, Gatorade, and Propel. You may also have black coffee and tea (no cream or milk).   Nothing by mouth within 1 hour of your surgery arrival time. This includes gum, candy, and breath mints.   If your care team tells you take medicine on the morning of surgery, it is okay to take medicine with a sip of water.   Do not drink alcohol for at least 24 hours before surgery. Do not use marijuana for 7 days prior to surgery.      ACTIVITY RESTRICTIONS  Avoid strenuous exercise and activity for 6-12 weeks.   Do not lift anything greater than 10 pounds (about a gallon of milk).  Extra pressure in your head can disrupt the healing of the internal surgical area and cause complications. Avoid the following activities that may increase the pressure in your head:  Avoid bending over with your head below your heart  Avoid heavy lifting or straining  Avoid lifting with your arms above your shoulders  Take care to prevent constipation, as this can lead to straining  Avoid drinking through straws  Avoid blowing your nose  Try to cough and sneeze with your mouth open        SLEEPING  Plan to sleep with your head elevated (at least 30 degrees) for at least 2 weeks after surgery. You may find that sleeping in a recliner is helpful and more comfortable. This is especially important if you have sleep apnea and are unable to use your CPAP after surgery.  Please let your care team know if you have sleep apnea. You will not be able to use your CPAP for  at least 2 weeks after surgery, sometimes longer. We will monitor your breathing closely while you are in the hospital.

## 2023-09-06 NOTE — TELEPHONE ENCOUNTER
FUTURE VISIT INFORMATION        SURGERY INFORMATION:  Date: 9/21/23  Location: [facility] or  Surgeon:  Sabas Lacy MD   Anesthesia Type:  general  Procedure: stealth assisted Endoscopic endonasal transsellar approach for tumor, nasoseptal flap possible lumbar drain possible fascia jett graft, possible abdominal fat graft      RECORDS REQUESTED FROM:         Primary Care Provider: Gurmeet Castillo MD  - [employer]

## 2023-09-06 NOTE — TELEPHONE ENCOUNTER
Patient called back and rescheduled surgery for 9/21. Rescheduled PAC appointment for 9/14 at 11am and consult with Dr. Ho to follow. Provided patient address and floor numbers. Patient understood and had no further questions    Meche Hinton, Perioperative Coordinator 9/6/2023 at 8:24 AM

## 2023-09-11 ENCOUNTER — TELEPHONE (OUTPATIENT)
Dept: ENDOCRINOLOGY | Facility: CLINIC | Age: 48
End: 2023-09-11
Payer: COMMERCIAL

## 2023-09-11 NOTE — TELEPHONE ENCOUNTER
9/11 Called and left voicemail, provided phone number [phone] to schedule a video visit with Dr. Mcgrath on 10/6 at 930 or 10/20 at 1030       Laure baird Procedure   Orthopedics, Podiatry, Sports Medicine, Ent ,Eye , Audiology, Adult Endocrine & Diabetes, Nutrition & Medication Therapy Management Specialties   [facility]        ----- Message from Nani Perez CMA sent at 9/11/2023  3:25 PM CDT -----  We can do 10/6/23 @ 9:30am     or      10/20/23 @ 10:30 am both are by video.    Thank you,  Nani  ----- Message -----  From: Laure Heredia  Sent: 9/11/2023   2:47 PM CDT  To: Nani Perez CMA    Do you have a day or time in mind for this patient?   ----- Message -----  From: Nani Perez CMA  Sent: 9/11/2023   2:42 PM CDT  To: Laure Heredia    Thank you, video ok, 3 -4 week post op  ----- Message -----  From: Delilah Mcgrath MD  Sent: 9/7/2023   6:32 PM CDT  To: Nani Perez CMA    Thank you, video ok, 3 -4 week post op  ----- Message -----  From: Nani Perez CMA  Sent: 9/7/2023  12:18 PM CDT  To: MD Dr. Alcides Reyes, this patient is scheduled for surgery on 9/21/23. How long after surgery do you want to see him? Would a video visit be ok?    Thank you,  Nani Perez CMA  Adult Endocrinology  [facility]    ----- Message -----  From: Laure Heredia  Sent: 9/7/2023   8:50 AM CDT  To: Nani Perez CMA    Looks like surgery is scheduled for 9/21/2023 when can we schedule with dr. Mcgrath   ----- Message -----  From: Nani Perez CMA  Sent: 8/29/2023   8:41 AM CDT  To: Laure Kelsey,    He needs to be scheduled for after his surgery. It does not look like he has anything scheduled yet.    Thanks,  Nani Perez CMA  Adult Endocrinology  [facility] [place]    ----- Message -----  From: Laure Heredia  Sent: 8/28/2023  11:16 AM CDT  To: [facility] Endocrinology Adult [place]    HI,     These were my follow up  instructions for Dr. White patient:     Disposition: Return perioperative follow up.    When should I schedule a follow up?     Thanks   Laure baird Procedure   Orthopedics, Podiatry, Sports Medicine, Ent ,Eye , Audiology, Adult Endocrine & Diabetes, Nutrition & Medication Therapy Management Specialties   [facility]

## 2023-09-13 LAB
ABO/RH(D): NORMAL
ANTIBODY SCREEN: NEGATIVE
SPECIMEN EXPIRATION DATE: NORMAL

## 2023-09-13 NOTE — TELEPHONE ENCOUNTER
9/13 Patient is currently scheduled for this appointment.     Laure baird Procedure   Orthopedics, Podiatry, Sports Medicine, Ent ,Eye , Audiology, Adult Endocrine & Diabetes, Nutrition & Medication Therapy Management Specialties   [facility]

## 2023-09-14 ENCOUNTER — PRE VISIT (OUTPATIENT)
Dept: SURGERY | Facility: CLINIC | Age: 48
End: 2023-09-14

## 2023-09-14 ENCOUNTER — OFFICE VISIT (OUTPATIENT)
Dept: OTOLARYNGOLOGY | Facility: CLINIC | Age: 48
End: 2023-09-14
Payer: COMMERCIAL

## 2023-09-14 ENCOUNTER — OFFICE VISIT (OUTPATIENT)
Dept: SURGERY | Facility: CLINIC | Age: 48
End: 2023-09-14
Payer: COMMERCIAL

## 2023-09-14 ENCOUNTER — ANESTHESIA EVENT (OUTPATIENT)
Dept: SURGERY | Facility: CLINIC | Age: 48
End: 2023-09-14
Payer: COMMERCIAL

## 2023-09-14 ENCOUNTER — PRE VISIT (OUTPATIENT)
Dept: OTOLARYNGOLOGY | Facility: CLINIC | Age: 48
End: 2023-09-14

## 2023-09-14 ENCOUNTER — LAB (OUTPATIENT)
Dept: LAB | Facility: CLINIC | Age: 48
End: 2023-09-14
Payer: COMMERCIAL

## 2023-09-14 VITALS
TEMPERATURE: 98.4 F | DIASTOLIC BLOOD PRESSURE: 82 MMHG | HEIGHT: 71 IN | HEART RATE: 73 BPM | BODY MASS INDEX: 30.66 KG/M2 | WEIGHT: 219 LBS | SYSTOLIC BLOOD PRESSURE: 122 MMHG | OXYGEN SATURATION: 97 %

## 2023-09-14 VITALS
SYSTOLIC BLOOD PRESSURE: 122 MMHG | DIASTOLIC BLOOD PRESSURE: 82 MMHG | TEMPERATURE: 98.4 F | HEIGHT: 71 IN | BODY MASS INDEX: 30.67 KG/M2 | WEIGHT: 219.1 LBS | RESPIRATION RATE: 16 BRPM | OXYGEN SATURATION: 97 % | HEART RATE: 73 BPM

## 2023-09-14 DIAGNOSIS — Z01.818 PREOP EXAMINATION: ICD-10-CM

## 2023-09-14 DIAGNOSIS — D35.2 PITUITARY MACROADENOMA (H): ICD-10-CM

## 2023-09-14 DIAGNOSIS — D35.2 PITUITARY MACROADENOMA (H): Primary | ICD-10-CM

## 2023-09-14 DIAGNOSIS — Z01.818 PREOP EXAMINATION: Primary | ICD-10-CM

## 2023-09-14 LAB
ANION GAP SERPL CALCULATED.3IONS-SCNC: 8 MMOL/L (ref 7–15)
BASOPHILS # BLD AUTO: 0.1 10E3/UL (ref 0–0.2)
BASOPHILS NFR BLD AUTO: 1 %
BUN SERPL-MCNC: 15.1 MG/DL (ref 6–20)
CALCIUM SERPL-MCNC: 9.5 MG/DL (ref 8.6–10)
CHLORIDE SERPL-SCNC: 104 MMOL/L (ref 98–107)
CREAT SERPL-MCNC: 1 MG/DL (ref 0.67–1.17)
DEPRECATED HCO3 PLAS-SCNC: 26 MMOL/L (ref 22–29)
EGFRCR SERPLBLD CKD-EPI 2021: >90 ML/MIN/1.73M2
EOSINOPHIL # BLD AUTO: 0 10E3/UL (ref 0–0.7)
EOSINOPHIL NFR BLD AUTO: 1 %
ERYTHROCYTE [DISTWIDTH] IN BLOOD BY AUTOMATED COUNT: 13.2 % (ref 10–15)
GLUCOSE SERPL-MCNC: 109 MG/DL (ref 70–99)
HCT VFR BLD AUTO: 38.7 % (ref 40–53)
HGB BLD-MCNC: 13.3 G/DL (ref 13.3–17.7)
IMM GRANULOCYTES # BLD: 0 10E3/UL
IMM GRANULOCYTES NFR BLD: 0 %
LYMPHOCYTES # BLD AUTO: 1.7 10E3/UL (ref 0.8–5.3)
LYMPHOCYTES NFR BLD AUTO: 37 %
MCH RBC QN AUTO: 29.9 PG (ref 26.5–33)
MCHC RBC AUTO-ENTMCNC: 34.4 G/DL (ref 31.5–36.5)
MCV RBC AUTO: 87 FL (ref 78–100)
MONOCYTES # BLD AUTO: 0.3 10E3/UL (ref 0–1.3)
MONOCYTES NFR BLD AUTO: 7 %
NEUTROPHILS # BLD AUTO: 2.5 10E3/UL (ref 1.6–8.3)
NEUTROPHILS NFR BLD AUTO: 54 %
NRBC # BLD AUTO: 0 10E3/UL
NRBC BLD AUTO-RTO: 0 /100
PLATELET # BLD AUTO: 249 10E3/UL (ref 150–450)
POTASSIUM SERPL-SCNC: 4.2 MMOL/L (ref 3.4–5.3)
RBC # BLD AUTO: 4.45 10E6/UL (ref 4.4–5.9)
SODIUM SERPL-SCNC: 138 MMOL/L (ref 136–145)
WBC # BLD AUTO: 4.7 10E3/UL (ref 4–11)

## 2023-09-14 PROCEDURE — 80048 BASIC METABOLIC PNL TOTAL CA: CPT | Performed by: PATHOLOGY

## 2023-09-14 PROCEDURE — 36415 COLL VENOUS BLD VENIPUNCTURE: CPT | Performed by: PATHOLOGY

## 2023-09-14 PROCEDURE — 85025 COMPLETE CBC W/AUTO DIFF WBC: CPT | Performed by: PATHOLOGY

## 2023-09-14 PROCEDURE — 86901 BLOOD TYPING SEROLOGIC RH(D): CPT | Performed by: PHYSICIAN ASSISTANT

## 2023-09-14 PROCEDURE — 31231 NASAL ENDOSCOPY DX: CPT | Performed by: OTOLARYNGOLOGY

## 2023-09-14 PROCEDURE — 86850 RBC ANTIBODY SCREEN: CPT | Performed by: PHYSICIAN ASSISTANT

## 2023-09-14 PROCEDURE — 99205 OFFICE O/P NEW HI 60 MIN: CPT | Mod: 25 | Performed by: OTOLARYNGOLOGY

## 2023-09-14 PROCEDURE — 99204 OFFICE O/P NEW MOD 45 MIN: CPT | Performed by: PHYSICIAN ASSISTANT

## 2023-09-14 RX ORDER — MULTIPLE VITAMINS W/ MINERALS TAB 9MG-400MCG
1 TAB ORAL EVERY EVENING
COMMUNITY

## 2023-09-14 ASSESSMENT — PAIN SCALES - GENERAL
PAINLEVEL: NO PAIN (0)
PAINLEVEL: NO PAIN (0)

## 2023-09-14 ASSESSMENT — ENCOUNTER SYMPTOMS: SEIZURES: 0

## 2023-09-14 ASSESSMENT — LIFESTYLE VARIABLES: TOBACCO_USE: 0

## 2023-09-14 NOTE — H&P
Pre-Operative H & P     CC:  Preoperative exam to assess for increased cardiopulmonary risk while undergoing surgery and anesthesia.    Date of Encounter: 9/14/2023  Primary Care Physician:  Gurmeet Castillo     Reason for visit:   Encounter Diagnoses   Name Primary?    Pituitary macroadenoma (H) Yes    Preop examination        HPI  Yony Loera is a 48 year old male who presents for pre-operative H & P in preparation for  Procedure Information       Case: [id number] Date/Time: 09/21/23 0730    Procedures:       stealth assisted Endoscopic endonasal transsellar approach for tumor, nasoseptal flap (Bilateral: Head)      possible lumbar drain (Spine)      possible fascia jett graft, possible abdominal fat graft (Update)    Anesthesia type: General    Diagnosis: Pituitary macroadenoma (H) [D35.2]    Pre-op diagnosis: Pituitary macroadenoma (H) [D35.2]    Location:  OR [address] [place] [address] OR    Providers: Sabas Lacy MD            Patient is being evaluated for comorbid conditions of hypothyroidism.    Mr. Loera was found to have a pituitary macroadenoma during a work up for visual disturbance. He now presents for the above procedure.      History is obtained from the patient and chart review    Hx of abnormal bleeding or anti-platelet use: denies      Past Medical History  Past Medical History:   Diagnosis Date    Pituitary macroadenoma (H) 08/2023       Past Surgical History  Past Surgical History:   Procedure Laterality Date    DENTAL SURGERY      wisdom       Prior to Admission Medications  Current Outpatient Medications   Medication Sig Dispense Refill    cetirizine (ZYRTEC) 10 MG tablet Take 1 tablet by mouth daily as needed      levothyroxine (SYNTHROID/LEVOTHROID) 75 MCG tablet Take 1 tablet (75 mcg) by mouth daily (Patient taking differently: Take 75 mcg by mouth every morning) 90 tablet 3    multivitamin w/minerals (MULTI-VITAMIN) tablet Take 1 tablet by mouth every evening         Allergies  No Known  Allergies    Social History  Social History     Socioeconomic History    Marital status:      Spouse name: Not on file    Number of children: Not on file    Years of education: Not on file    Highest education level: Not on file   Occupational History    Not on file   Tobacco Use    Smoking status: Never    Smokeless tobacco: Never   Substance and Sexual Activity    Alcohol use: Yes     Alcohol/week: 2.0 - 3.0 standard drinks of alcohol     Types: 2 - 3 Standard drinks or equivalent per week    Drug use: Not Currently    Sexual activity: Not on file   Other Topics Concern    Not on file   Social History Narrative    Not on file     Social Determinants of Health     Financial Resource Strain: Not on file   Food Insecurity: Not on file   Transportation Needs: Not on file   Physical Activity: Not on file   Stress: Not on file   Social Connections: Not on file   Intimate Partner Violence: Not on file   Housing Stability: Not on file       Family History  Family History   Problem Relation Age of Onset    Anesthesia Reaction Father         PONV    Glaucoma No family hx of     Macular Degeneration No family hx of     Deep Vein Thrombosis (DVT) No family hx of        Review of Systems  The complete review of systems is negative other than noted in the HPI or here.     Anesthesia Evaluation   Pt has had prior anesthetic. Type: MAC.    No history of anesthetic complications       ROS/MED HX  ENT/Pulmonary:  - neg pulmonary ROS  (-) tobacco use, asthma, sleep apnea and recent URI   Neurologic: Comment: Pituitary adenoma     (-) no seizures and no CVA   Cardiovascular:  - neg cardiovascular ROS     METS/Exercise Tolerance: >4 METS    Hematologic:  - neg hematologic  ROS  (-) history of blood clots and history of blood transfusion   Musculoskeletal:  - neg musculoskeletal ROS     GI/Hepatic:  - neg GI/hepatic ROS  (-) GERD and liver disease   Renal/Genitourinary:  - neg Renal ROS  (-) renal disease   Endo:     (+)          " thyroid problem, hypothyroidism,        (-) Type II DM   Psychiatric/Substance Use:  - neg psychiatric ROS     Infectious Disease:  - neg infectious disease ROS     Malignancy:  - neg malignancy ROS     Other:  - neg other ROS          /82 (BP Location: Right arm, Patient Position: Sitting, Cuff Size: Adult Regular)   Pulse 73   Temp 98.4  F (36.9  C) (Oral)   Resp 16   Ht 1.803 m (5' 11\")   Wt 99.4 kg (219 lb 1.6 oz)   SpO2 97%   BMI 30.56 kg/m      Physical Exam  Constitutional: Awake, alert, cooperative, no apparent distress, and appears stated age.  Eyes: Pupils equal, round and reactive to light, extra ocular muscles intact, sclera clear, conjunctiva normal.  HENT: Normocephalic, oral pharynx with moist mucus membranes, good dentition. No goiter appreciated. No removable dental hardware.  Respiratory: Clear to auscultation bilaterally, no crackles or wheezing. No SOB when supine.  Cardiovascular: Regular rate and rhythm, normal S1 and S2, and no murmur noted.  Carotids +2, no bruits. No edema. Palpable pulses to radial, DP and PT arteries.   GI: Normal bowel sounds, soft, non-distended, non-tender, no masses palpated.    Lymph/Hematologic: No cervical lymphadenopathy and no supraclavicular lymphadenopathy.  Genitourinary:  deferred  Skin: Warm and dry.  No rashes.   Musculoskeletal: Full ROM of neck. There is no redness, warmth, or swelling of the joints. Gross motor strength is normal.    Neurologic: Awake, alert, oriented to name, place and time. Cranial nerves II-XII are grossly intact. Gait is normal. Ambulates from chair to exam table, seats self, lies supine and sits back up w/o assistance.  Neuropsychiatric: Calm, cooperative. Normal affect. Pleasant. Answers questions appropriately, follows commands w/o difficulty.        PRIOR LABS/DIAGNOSTIC STUDIES:    All labs and imaging personally reviewed          The patient's records and results personally reviewed by this provider. "       LAB/DIAGNOSTIC STUDIES TODAY:  BMP, CBC, T&S    WBC Count 4.0 - 11.0 10e3/uL 4.7     RBC Count 4.40 - 5.90 10e6/uL 4.45    Hemoglobin 13.3 - 17.7 g/dL 13.3    Hematocrit 40.0 - 53.0 % 38.7 Low     MCV 78 - 100 fL 87    MCH 26.5 - 33.0 pg 29.9    MCHC 31.5 - 36.5 g/dL 34.4    RDW 10.0 - 15.0 % 13.2    Platelet Count 150 - 450 10e3/uL 249    % Neutrophils % 54    % Lymphocytes % 37    % Monocytes % 7    % Eosinophils % 1    % Basophils % 1    % Immature Granulocytes % 0    NRBCs per 100 WBC <1 /100 0    Absolute Neutrophils 1.6 - 8.3 10e3/uL 2.5    Absolute Lymphocytes 0.8 - 5.3 10e3/uL 1.7    Absolute Monocytes 0.0 - 1.3 10e3/uL 0.3    Absolute Eosinophils 0.0 - 0.7 10e3/uL 0.0    Absolute Basophils 0.0 - 0.2 10e3/uL 0.1    Absolute Immature Granulocytes <=0.4 10e3/uL 0.0    Absolute NRBCs 10e3/uL 0.0     Sodium 136 - 145 mmol/L 138 138     Potassium 3.4 - 5.3 mmol/L 4.2 4.2    Chloride 98 - 107 mmol/L 104 103    Carbon Dioxide (CO2) 22 - 29 mmol/L 26 25    Anion Gap 7 - 15 mmol/L 8 10    Urea Nitrogen 6.0 - 20.0 mg/dL 15.1 18.9    Creatinine 0.67 - 1.17 mg/dL 1.00 1.07    Calcium 8.6 - 10.0 mg/dL 9.5 9.9    Glucose 70 - 99 mg/dL 109 High  106 High     GFR Estimate >60 mL/min/1.73m2 >90 85       Assessment    Yony Loera is a 48 year old male seen as a PAC referral for risk assessment and optimization for anesthesia.    Plan/Recommendations  Pt will be optimized for the proposed procedure.  See below for details on the assessment, risk, and preoperative recommendations    NEUROLOGY  - No history of TIA, CVA or seizure  - Pituitary macroadenoma  -Post Op delirium risk factors:  No risk identified    ENT  - No current airway concerns.  Will need to be reassessed day of surgery.  Mallampati: II  TM: > 3    CARDIAC  - No history of CAD, Hypertension, and Afib  - METS (Metabolic Equivalents)  Patient performs 4 or more METS exercise without symptoms            Total Score: 0      RCRI-Low risk: Class 2 0.9%  "complication rate            Total Score: 1    RCRI: High Risk Surgery        PULMONARY  GLADYS Low Risk            Total Score: 2    GLADYS: Over 50 ys old    GLADYS: Male      - Denies asthma or inhaler use  - Tobacco History    History   Smoking Status    Never   Smokeless Tobacco    Never       GI  - Denies GERD  PONV Medium Risk  Total Score: 2           1 AN PONV: Patient is not a current smoker    1 AN PONV: Intended Post Op Opioids          ENDOCRINE    - BMI: Estimated body mass index is 30.56 kg/m  as calculated from the following:    Height as of this encounter: 1.803 m (5' 11\").    Weight as of this encounter: 99.4 kg (219 lb 1.6 oz).  Overweight (BMI 25.0-29.9)  - No history of Diabetes Mellitus  - Hypothyroidism    HEME  VTE Low Risk 0.5%            Total Score: 2    VTE: Male      - No history of abnormal bleeding or antiplatelet use.        The patient is aware that the final anesthesia plan will be decided by the assigned anesthesia provider on the date of service.      The patient is optimized for their procedure. AVS with information on surgery time/arrival time, meds and NPO status given by nursing staff. No further diagnostic testing indicated.      On the day of service:     Prep time: 13 minutes  Visit time: 25 minutes  Documentation time: 11 minutes  ------------------------------------------  Total time: 49 minutes      Huong Mayen PA-C  Preoperative Assessment Center  [facility]  Clinic and Surgery Center  Phone: [phone]  Fax: [phone]    "

## 2023-09-14 NOTE — PATIENT INSTRUCTIONS
Preparing for Your Surgery      Name:  Yony Loera   MRN:  [id number]   :  1975   Today's Date:  2023       Arriving for surgery:  Surgery date:  23  Arrival time:  5.30AM    Please come to:     Please come to:      [facility] [address]  [address] [place]  [address]      The [facility] Patient /Visitor Ramp is located at [address]. Patients and visitors who self-park will receive the reduced hospital parking rate. If the Patient /Visitor Ramp is full, please follow the signs to the  parking located at the main hospital entrance.     parking is available ( 24 hours/ 7 days a week)    Discounted parking pass options are available for patients and visitors. They can be purchased at the [employer] desk at the main hospital entrance.    -    Stop at the security desk and they will direct surgery patients to the 3rd floor Surgery Waiting Room. [phone]     -  If you are in need of directions, wheelchair or escort please stop at the Information/security desk in the lobby.       What can I eat or drink?  -  You may eat and drink normally up to 8 hours prior to arrival time. (Until 9.30PM)  -  You may have clear liquids until 2 hours prior to arrival time. (Until 3.30AM)    Examples of clear liquids:  Water  Clear broth  Juices (apple, white grape, white cranberry  and cider) without pulp  Noncarbonated, powder based beverages  (lemonade and Juan-Aid)  Sodas (Sprite, 7-Up, ginger ale and seltzer)  Coffee or tea (without milk or cream)  Gatorade    -  No Alcohol or cannabis products for at least 24 hours before surgery.     Which medicines can I take?    Hold Aspirin for 7 days before surgery.   Hold Multivitamins for 7 days before surgery.  Hold Supplements for 7 days before surgery.  Hold Ibuprofen (Advil, Motrin) for 1 day(s) before surgery--unless otherwise directed by surgeon.  Hold Naproxen  (Aleve) for 4 days before surgery.    -  DO NOT take these medications the day of surgery:  Cetirizine (Zyrtec)    -  PLEASE TAKE these medications the day of surgery:  Levothyroxine (Synthroid).    How do I prepare myself?  - Please take 2 showers (one the night prior to surgery and one the morning of surgery) using Scrubcare or Hibiclens soap.    Use this soap only from the neck to your toes.     Leave the soap on your skin for one minute--then rinse thoroughly.      You may use your own shampoo and conditioner. No other hair products.   - Please remove all jewelry and body piercings.  - No lotions, deodorants or fragrance.  - No makeup or fingernail polish.   - Bring your ID and insurance card.    -If you have a Deep Brain Stimulator, Spinal Cord Stimulator, or any Neuro Stimulator device---you must bring the remote control to the hospital.      ALL PATIENTS GOING HOME THE SAME DAY OF SURGERY ARE REQUIRED TO HAVE A RESPONSIBLE ADULT TO DRIVE AND BE IN ATTENDANCE WITH THEM FOR 24 HOURS FOLLOWING SURGERY.    Covid testing policy as of 12/06/2022  Your surgeon will notify and schedule you for a COVID test if one is needed before surgery--please direct any questions or COVID symptoms to your surgeon      Questions or Concerns:    - For any questions regarding the day of surgery or your hospital stay, please contact the Pre Admission Nursing Office at [phone].       - If you have health changes between today and your surgery, please call your surgeon.       - For questions after surgery, please call your surgeons office.           Current Visitor Guidelines    You may have 2 visitors in the pre op area.    Visiting hours: 8 a.m. to 8:30 p.m.    You may have four visitors during your inpatient hospital stay.    Patients confirmed or suspected to have symptoms of COVID 19 or flu:     No visitors allowed for adult patients.   Children (under age 18) can have 1 named visitor.     People who are sick or showing  symptoms of COVID 19 or flu:    Are not allowed to visit patients--we can only make exceptions in special situations.       Please follow these guidelines for your visit:          Please maintain social distance          Masking is optional--however at times you may be asked to wear a mask for the safety of yourself and others     Clean your hands with alcohol hand . Do this when you arrive at and leave the building and patient room,    And again after you touch your mask or anything in the room.     Go directly to and from the room you are visiting.     Stay in the patient s room during your visit. Limit going to other places in the hospital as much as possible     Leave bags and jackets at home or in the car.     For everyone s health, please don t come and go during your visit. That includes for smoking   during your visit.

## 2023-09-14 NOTE — LETTER
9/14/2023       RE: Yony Loera  [id number] [address] [facility] [address]     Dear Colleague,    Thank you for referring your patient, Yony Loera, to the [facility] [place] [facility]. Please see a copy of my visit note below.    Dx: Pituitary macroadenoma    History of Present Illness: 48-year-old gentleman here in the otolaryngology clinic for evaluation in preparation for his transnasal endoscopic approach to resect his pituitary macroadenoma complaining of the patient was diagnosed with large pituitary microadenoma after finding of visual field cuts during his annual ophthalmologic evaluation.  The patient states that his visual changes has been stable.  Denies diplopia.  He does have history of nasal congestion for a long time.  He denies any episodes of sinusitis.    MEDICATIONS:     Current Outpatient Medications   Medication Sig Dispense Refill    cetirizine (ZYRTEC) 10 MG tablet Take 1 tablet by mouth daily as needed      levothyroxine (SYNTHROID/LEVOTHROID) 75 MCG tablet Take 1 tablet (75 mcg) by mouth daily (Patient taking differently: Take 75 mcg by mouth every morning) 90 tablet 3    multivitamin w/minerals (MULTI-VITAMIN) tablet Take 1 tablet by mouth every evening         ALLERGIES:  No Known Allergies      PAST MEDICAL HISTORY:   Past Medical History:   Diagnosis Date    Pituitary macroadenoma (H) 08/2023        FAMILY HISTORY:    Family History   Problem Relation Age of Onset    Anesthesia Reaction Father         PONV    Glaucoma No family hx of     Macular Degeneration No family hx of     Deep Vein Thrombosis (DVT) No family hx of        REVIEW OF SYSTEMS:  12 point ROS was negative other than the symptoms noted above in the HPI.  PHYSICAL EXAMINATION:      Constitutional:  The patient was unaccompanied, well-groomed, and in no acute distress.     Skin: Normal:  warm and pink without rash   Neurologic:  Alert and oriented x 3.  CN's III-XII within normal limits.  Voice normal.    Psychiatric: The patient's affect was calm, cooperative, and appropriate.     Communication:  Normal; communicates verbally, normal voice quality.   Respiratory: Breathing comfortably without stridor or exertion of accessory muscles.    Head/Face:  Normocephalic and atraumatic.  No lesions or scars. No sinus tenderness.    Salivary glands -  Normal size, no tenderness, swelling, or palpable masses   Eyes: Pupils were equal and reactive.  Extraocular movement intact.     Ears: Pinnae and tragus non-tender.  EAC's and TM's were clear.      Nose: Sinuses were non-tender.  Anterior rhinoscopy revealed midline septum and absence of purulence or polyps.     Oral Cavity: Normal tongue, floor of mouth, buccal mucosa, and palate.  No lesions or masses on inspection or palpation.     Oropharynx: Normal mucosa, palate symmetric with normal elevation. No abnormal lymph tissue in the oropharynx.            Neck: Supple with normal laryngeal and tracheal landmarks.  The parotid beds were without masses.  No palpable thyroid.  Normal range of motion   Lymphatic: There is no palpable lymphadenopathy in the neck.           Nasal endoscopy.  Consent for nasal endoscopy was obtained.  I confirmed correctness of the procedure and identity of the patient.  Nasal endoscopy was indicated due to pituitary macroadenoma.  The nose was topically decongested and anesthetized.  The nasal endoscope was passed under endoscopic vision.  The septum is in the midline.  There are bilateral spurs.  Inferior and middle turbinate are edematous.  No evidence of polyps or masses.  The nasopharynx is normal.     IMPRESSION AND PLAN: 48-year-old gentleman with a diagnosis of pituitary macroadenoma.  Today I explained to him the details of the surgical procedure.  I did answer all his questions.  And I will meet him next week for his surgery.    Thank you very much for the  opportunity to participate in the care of your patient.      Gary Tavares MD, M.D.  Otolaryngology- Head & Neck Surgery  [phone]

## 2023-09-14 NOTE — PROGRESS NOTES
Dx: Pituitary macroadenoma    History of Present Illness: 48-year-old gentleman here in the otolaryngology clinic for evaluation in preparation for his transnasal endoscopic approach to resect his pituitary macroadenoma complaining of the patient was diagnosed with large pituitary microadenoma after finding of visual field cuts during his annual ophthalmologic evaluation.  The patient states that his visual changes has been stable.  Denies diplopia.  He does have history of nasal congestion for a long time.  He denies any episodes of sinusitis.    MEDICATIONS:     Current Outpatient Medications   Medication Sig Dispense Refill    cetirizine (ZYRTEC) 10 MG tablet Take 1 tablet by mouth daily as needed      levothyroxine (SYNTHROID/LEVOTHROID) 75 MCG tablet Take 1 tablet (75 mcg) by mouth daily (Patient taking differently: Take 75 mcg by mouth every morning) 90 tablet 3    multivitamin w/minerals (MULTI-VITAMIN) tablet Take 1 tablet by mouth every evening         ALLERGIES:  No Known Allergies      PAST MEDICAL HISTORY:   Past Medical History:   Diagnosis Date    Pituitary macroadenoma (H) 08/2023        FAMILY HISTORY:    Family History   Problem Relation Age of Onset    Anesthesia Reaction Father         PONV    Glaucoma No family hx of     Macular Degeneration No family hx of     Deep Vein Thrombosis (DVT) No family hx of        REVIEW OF SYSTEMS:  12 point ROS was negative other than the symptoms noted above in the HPI.  PHYSICAL EXAMINATION:      Constitutional:  The patient was unaccompanied, well-groomed, and in no acute distress.     Skin: Normal:  warm and pink without rash   Neurologic: Alert and oriented x 3.  CN's III-XII within normal limits.  Voice normal.    Psychiatric: The patient's affect was calm, cooperative, and appropriate.     Communication:  Normal; communicates verbally, normal voice quality.   Respiratory: Breathing comfortably without stridor or exertion of accessory muscles.    Head/Face:   Normocephalic and atraumatic.  No lesions or scars. No sinus tenderness.    Salivary glands -  Normal size, no tenderness, swelling, or palpable masses   Eyes: Pupils were equal and reactive.  Extraocular movement intact.     Ears: Pinnae and tragus non-tender.  EAC's and TM's were clear.      Nose: Sinuses were non-tender.  Anterior rhinoscopy revealed midline septum and absence of purulence or polyps.     Oral Cavity: Normal tongue, floor of mouth, buccal mucosa, and palate.  No lesions or masses on inspection or palpation.     Oropharynx: Normal mucosa, palate symmetric with normal elevation. No abnormal lymph tissue in the oropharynx.            Neck: Supple with normal laryngeal and tracheal landmarks.  The parotid beds were without masses.  No palpable thyroid.  Normal range of motion   Lymphatic: There is no palpable lymphadenopathy in the neck.           Nasal endoscopy.  Consent for nasal endoscopy was obtained.  I confirmed correctness of the procedure and identity of the patient.  Nasal endoscopy was indicated due to pituitary macroadenoma.  The nose was topically decongested and anesthetized.  The nasal endoscope was passed under endoscopic vision.  The septum is in the midline.  There are bilateral spurs.  Inferior and middle turbinate are edematous.  No evidence of polyps or masses.  The nasopharynx is normal.     IMPRESSION AND PLAN: 48-year-old gentleman with a diagnosis of pituitary macroadenoma.  Today I explained to him the details of the surgical procedure.  I did answer all his questions.  And I will meet him next week for his surgery.    Thank you very much for the opportunity to participate in the care of your patient.      Gary Tavares MD, M.D.  Otolaryngology- Head & Neck Surgery  [phone]

## 2023-09-19 PROBLEM — D35.2 PITUITARY ADENOMA (H): Status: ACTIVE | Noted: 2023-09-19

## 2023-09-19 PROBLEM — H53.47 BITEMPORAL HEMIANOPSIA: Status: ACTIVE | Noted: 2023-09-19

## 2023-09-20 ENCOUNTER — DOCUMENTATION ONLY (OUTPATIENT)
Dept: NEUROSURGERY | Facility: CLINIC | Age: 48
End: 2023-09-20

## 2023-09-20 RX ORDER — ACETAMINOPHEN 325 MG/1
975 TABLET ORAL ONCE
Status: COMPLETED | OUTPATIENT
Start: 2023-09-20 | End: 2023-09-21

## 2023-09-20 RX ORDER — LIDOCAINE 40 MG/G
CREAM TOPICAL
Status: DISCONTINUED | OUTPATIENT
Start: 2023-09-20 | End: 2023-09-21 | Stop reason: HOSPADM

## 2023-09-20 RX ORDER — GABAPENTIN 300 MG/1
300 CAPSULE ORAL
Status: COMPLETED | OUTPATIENT
Start: 2023-09-20 | End: 2023-09-21

## 2023-09-20 RX ORDER — CEFAZOLIN SODIUM/WATER 2 G/20 ML
2 SYRINGE (ML) INTRAVENOUS
Status: COMPLETED | OUTPATIENT
Start: 2023-09-20 | End: 2023-09-21

## 2023-09-20 RX ORDER — SODIUM CHLORIDE, SODIUM LACTATE, POTASSIUM CHLORIDE, CALCIUM CHLORIDE 600; 310; 30; 20 MG/100ML; MG/100ML; MG/100ML; MG/100ML
INJECTION, SOLUTION INTRAVENOUS CONTINUOUS
Status: DISCONTINUED | OUTPATIENT
Start: 2023-09-20 | End: 2023-09-21 | Stop reason: HOSPADM

## 2023-09-20 RX ORDER — CEFAZOLIN SODIUM/WATER 2 G/20 ML
2 SYRINGE (ML) INTRAVENOUS SEE ADMIN INSTRUCTIONS
Status: DISCONTINUED | OUTPATIENT
Start: 2023-09-20 | End: 2023-09-21 | Stop reason: HOSPADM

## 2023-09-20 ASSESSMENT — LIFESTYLE VARIABLES: TOBACCO_USE: 0

## 2023-09-20 ASSESSMENT — ENCOUNTER SYMPTOMS: SEIZURES: 0

## 2023-09-20 NOTE — ANESTHESIA PREPROCEDURE EVALUATION
Procedure:  stealth assisted Endoscopic endonasal transsellar approach for tumor, nasoseptal flap  possible lumbar drain  possible fascia jett graft, possible abdominal fat graft             Review of Systems  The complete review of systems is negative other than noted in the HPI or here.     Anesthesia Evaluation   Pt has had prior anesthetic. Type: MAC.    No history of anesthetic complications       ROS/MED HX  ENT/Pulmonary:  - neg pulmonary ROS  (-) tobacco use, asthma, sleep apnea and recent URI   Neurologic: Comment: Pituitary adenoma     (-) no seizures and no CVA   Cardiovascular:  - neg cardiovascular ROS     METS/Exercise Tolerance: >4 METS    Hematologic:  - neg hematologic  ROS  (-) history of blood clots and history of blood transfusion   Musculoskeletal:  - neg musculoskeletal ROS     GI/Hepatic:  - neg GI/hepatic ROS  (-) GERD and liver disease   Renal/Genitourinary:  - neg Renal ROS  (-) renal disease   Endo:     (+)          thyroid problem, hypothyroidism,        (-) Type II DM   Psychiatric/Substance Use:  - neg psychiatric ROS     Infectious Disease:  - neg infectious disease ROS     Malignancy:  - neg malignancy ROS     Other:  - neg other ROS          Physical Exam    Airway        Mallampati: I   TM distance: > 3 FB   Neck ROM: full   Mouth opening: > 3 cm    Respiratory Devices and Support         Dental       (+) Completely normal teeth      Cardiovascular   cardiovascular exam normal          Pulmonary   pulmonary exam normal                Anesthesia Plan    ASA Status:  2    NPO Status:  NPO Appropriate    Anesthesia Type: General.     - Airway: ETT   Induction: Intravenous.   Maintenance: Balanced.   Techniques and Equipment:     - Lines/Monitors: 2nd IV, Arterial Line     Consents    Anesthesia Plan(s) and associated risks, benefits, and realistic alternatives discussed. Questions answered and patient/representative(s) expressed understanding.     - Discussed: Risks, Benefits and  Alternatives for BOTH SEDATION and the PROCEDURE were discussed     - Discussed with:  Patient      - Extended Intubation/Ventilatory Support Discussed: Yes.      - Patient is DNR/DNI Status: No     Use of blood products discussed: Yes.     - Discussed with: Patient.     Postoperative Care    Pain management: IV analgesics, Oral pain medications.   PONV prophylaxis: Ondansetron (or other 5HT-3), Dexamethasone or Solumedrol     Comments:           H&P reviewed: Unable to attach H&P to encounter due to EHR limitations. H&P Update: appropriate H&P reviewed, patient examined. No interval changes since H&P (within 30 days).

## 2023-09-20 NOTE — PROGRESS NOTES
PAPERWORK DOCUMENTATION    How Paperwork is received:  Right Fax    Type of Paperwork: FMLA and STD     Who is the paperwork for:  Patient    Received Date September 12th , 2023    Who Received the paperwork:  Rsoanna    Form to be Completed by:  Rosanna    Anticipated Completion Date: September 19th-21st, 2023    Date Completed Form Faxed to Requested Entity: Writer faxed patient's STD and FMLA paperwork to Jonathan @ [phone] on September 20th, 2023. A copy was also scanned into the patient's EMR. Patient made aware via Client24 message.        ANA Santos  Neurosurgery nurse navigator.

## 2023-09-21 ENCOUNTER — HOSPITAL ENCOUNTER (OUTPATIENT)
Dept: CT IMAGING | Facility: CLINIC | Age: 48
Discharge: HOME | End: 2023-09-21
Attending: PHYSICIAN ASSISTANT | Admitting: NEUROLOGICAL SURGERY
Payer: COMMERCIAL

## 2023-09-21 ENCOUNTER — HOSPITAL ENCOUNTER (INPATIENT)
Facility: CLINIC | Age: 48
LOS: 5 days | Discharge: HOME | End: 2023-09-26
Attending: NEUROLOGICAL SURGERY | Admitting: NEUROLOGICAL SURGERY
Payer: COMMERCIAL

## 2023-09-21 ENCOUNTER — ANESTHESIA (OUTPATIENT)
Dept: SURGERY | Facility: CLINIC | Age: 48
End: 2023-09-21
Payer: COMMERCIAL

## 2023-09-21 DIAGNOSIS — D35.2 PITUITARY MACROADENOMA (H): ICD-10-CM

## 2023-09-21 DIAGNOSIS — D35.2 PITUITARY ADENOMA (H): Primary | ICD-10-CM

## 2023-09-21 LAB
ANION GAP SERPL CALCULATED.3IONS-SCNC: 13 MMOL/L (ref 7–15)
BASE EXCESS BLDA CALC-SCNC: -1.5 MMOL/L (ref -9.6–2)
BUN SERPL-MCNC: 16.5 MG/DL (ref 6–20)
CA-I BLD-MCNC: 4.6 MG/DL (ref 4.4–5.2)
CALCIUM SERPL-MCNC: 8.5 MG/DL (ref 8.6–10)
CHLORIDE SERPL-SCNC: 109 MMOL/L (ref 98–107)
CREAT SERPL-MCNC: 0.98 MG/DL (ref 0.67–1.17)
DEPRECATED HCO3 PLAS-SCNC: 22 MMOL/L (ref 22–29)
EGFRCR SERPLBLD CKD-EPI 2021: >90 ML/MIN/1.73M2
ERYTHROCYTE [DISTWIDTH] IN BLOOD BY AUTOMATED COUNT: 13.5 % (ref 10–15)
GLUCOSE BLD-MCNC: 135 MG/DL (ref 70–99)
GLUCOSE SERPL-MCNC: 149 MG/DL (ref 70–99)
HCO3 BLDA-SCNC: 24 MMOL/L (ref 21–28)
HCT VFR BLD AUTO: 31.9 % (ref 40–53)
HGB BLD-MCNC: 10.7 G/DL (ref 13.3–17.7)
HGB BLD-MCNC: 11 G/DL (ref 13.3–17.7)
LACTATE BLD-SCNC: 0.9 MMOL/L
MAGNESIUM SERPL-MCNC: 1.7 MG/DL (ref 1.7–2.3)
MCH RBC QN AUTO: 29.6 PG (ref 26.5–33)
MCHC RBC AUTO-ENTMCNC: 33.5 G/DL (ref 31.5–36.5)
MCV RBC AUTO: 88 FL (ref 78–100)
O2/TOTAL GAS SETTING VFR VENT: 42 %
PCO2 BLDA: 41 MM HG (ref 35–45)
PH BLDA: 7.37 [PH] (ref 7.35–7.45)
PHOSPHATE SERPL-MCNC: 4.1 MG/DL (ref 2.5–4.5)
PLATELET # BLD AUTO: 267 10E3/UL (ref 150–450)
PO2 BLDA: 200 MM HG (ref 80–105)
POTASSIUM BLD-SCNC: 4.2 MMOL/L (ref 3.5–5)
POTASSIUM SERPL-SCNC: 3.9 MMOL/L (ref 3.4–5.3)
RBC # BLD AUTO: 3.62 10E6/UL (ref 4.4–5.9)
SODIUM BLD-SCNC: 139 MMOL/L (ref 133–144)
SODIUM SERPL-SCNC: 144 MMOL/L (ref 136–145)
WBC # BLD AUTO: 12.7 10E3/UL (ref 4–11)

## 2023-09-21 PROCEDURE — 84132 ASSAY OF SERUM POTASSIUM: CPT | Performed by: STUDENT IN AN ORGANIZED HEALTH CARE EDUCATION/TRAINING PROGRAM

## 2023-09-21 PROCEDURE — 250N000024 HC ISOFLURANE, PER MIN: Performed by: NEUROLOGICAL SURGERY

## 2023-09-21 PROCEDURE — 83735 ASSAY OF MAGNESIUM: CPT | Performed by: STUDENT IN AN ORGANIZED HEALTH CARE EDUCATION/TRAINING PROGRAM

## 2023-09-21 PROCEDURE — 258N000003 HC RX IP 258 OP 636: Performed by: STUDENT IN AN ORGANIZED HEALTH CARE EDUCATION/TRAINING PROGRAM

## 2023-09-21 PROCEDURE — 70498 CT ANGIOGRAPHY NECK: CPT

## 2023-09-21 PROCEDURE — 250N000009 HC RX 250: Performed by: NURSE ANESTHETIST, CERTIFIED REGISTERED

## 2023-09-21 PROCEDURE — 250N000013 HC RX MED GY IP 250 OP 250 PS 637: Performed by: STUDENT IN AN ORGANIZED HEALTH CARE EDUCATION/TRAINING PROGRAM

## 2023-09-21 PROCEDURE — 250N000011 HC RX IP 250 OP 636: Performed by: STUDENT IN AN ORGANIZED HEALTH CARE EDUCATION/TRAINING PROGRAM

## 2023-09-21 PROCEDURE — 4A11X4G MONITORING OF PERIPHERAL NERVOUS ELECTRICAL ACTIVITY, INTRAOPERATIVE, EXTERNAL APPROACH: ICD-10-PCS | Performed by: NEUROLOGICAL SURGERY

## 2023-09-21 PROCEDURE — 85027 COMPLETE CBC AUTOMATED: CPT | Performed by: STUDENT IN AN ORGANIZED HEALTH CARE EDUCATION/TRAINING PROGRAM

## 2023-09-21 PROCEDURE — 70496 CT ANGIOGRAPHY HEAD: CPT | Mod: 26 | Performed by: RADIOLOGY

## 2023-09-21 PROCEDURE — 84100 ASSAY OF PHOSPHORUS: CPT | Performed by: STUDENT IN AN ORGANIZED HEALTH CARE EDUCATION/TRAINING PROGRAM

## 2023-09-21 PROCEDURE — 370N000017 HC ANESTHESIA TECHNICAL FEE, PER MIN: Performed by: NEUROLOGICAL SURGERY

## 2023-09-21 PROCEDURE — 88341 IMHCHEM/IMCYTCHM EA ADD ANTB: CPT | Mod: 26 | Performed by: SPECIALIST

## 2023-09-21 PROCEDURE — 70498 CT ANGIOGRAPHY NECK: CPT | Mod: 26 | Performed by: RADIOLOGY

## 2023-09-21 PROCEDURE — 8E09XBZ COMPUTER ASSISTED PROCEDURE OF HEAD AND NECK REGION: ICD-10-PCS | Performed by: NEUROLOGICAL SURGERY

## 2023-09-21 PROCEDURE — 250N000011 HC RX IP 250 OP 636: Performed by: NURSE ANESTHETIST, CERTIFIED REGISTERED

## 2023-09-21 PROCEDURE — 999N000141 HC STATISTIC PRE-PROCEDURE NURSING ASSESSMENT: Performed by: NEUROLOGICAL SURGERY

## 2023-09-21 PROCEDURE — 09QX0ZZ REPAIR LEFT SPHENOID SINUS, OPEN APPROACH: ICD-10-PCS | Performed by: OTOLARYNGOLOGY

## 2023-09-21 PROCEDURE — 64999 UNLISTED PX NERVOUS SYSTEM: CPT | Mod: 62 | Performed by: NEUROLOGICAL SURGERY

## 2023-09-21 PROCEDURE — 120N000002 HC R&B MED SURG/OB UMMC

## 2023-09-21 PROCEDURE — 70450 CT HEAD/BRAIN W/O DYE: CPT

## 2023-09-21 PROCEDURE — 272N000004 HC RX 272: Performed by: NEUROLOGICAL SURGERY

## 2023-09-21 PROCEDURE — 70496 CT ANGIOGRAPHY HEAD: CPT

## 2023-09-21 PROCEDURE — 272N000001 HC OR GENERAL SUPPLY STERILE: Performed by: NEUROLOGICAL SURGERY

## 2023-09-21 PROCEDURE — 360N000079 HC SURGERY LEVEL 6, PER MIN: Performed by: NEUROLOGICAL SURGERY

## 2023-09-21 PROCEDURE — 82330 ASSAY OF CALCIUM: CPT

## 2023-09-21 PROCEDURE — 05BL0ZZ EXCISION OF INTRACRANIAL VEIN, OPEN APPROACH: ICD-10-PCS | Performed by: NEUROLOGICAL SURGERY

## 2023-09-21 PROCEDURE — 09BX0ZZ EXCISION OF LEFT SPHENOID SINUS, OPEN APPROACH: ICD-10-PCS | Performed by: NEUROLOGICAL SURGERY

## 2023-09-21 PROCEDURE — 250N000009 HC RX 250: Performed by: NEUROLOGICAL SURGERY

## 2023-09-21 PROCEDURE — 258N000003 HC RX IP 258 OP 636: Performed by: NURSE ANESTHETIST, CERTIFIED REGISTERED

## 2023-09-21 PROCEDURE — 88342 IMHCHEM/IMCYTCHM 1ST ANTB: CPT | Mod: 26 | Performed by: SPECIALIST

## 2023-09-21 PROCEDURE — 88307 TISSUE EXAM BY PATHOLOGIST: CPT | Mod: TC | Performed by: NEUROLOGICAL SURGERY

## 2023-09-21 PROCEDURE — 00U20JZ SUPPLEMENT DURA MATER WITH SYNTHETIC SUBSTITUTE, OPEN APPROACH: ICD-10-PCS | Performed by: NEUROLOGICAL SURGERY

## 2023-09-21 PROCEDURE — 250N000011 HC RX IP 250 OP 636: Performed by: PHYSICIAN ASSISTANT

## 2023-09-21 PROCEDURE — 88307 TISSUE EXAM BY PATHOLOGIST: CPT | Mod: 26 | Performed by: SPECIALIST

## 2023-09-21 PROCEDURE — 0GB00ZZ EXCISION OF PITUITARY GLAND, OPEN APPROACH: ICD-10-PCS | Performed by: NEUROLOGICAL SURGERY

## 2023-09-21 PROCEDURE — 84132 ASSAY OF SERUM POTASSIUM: CPT

## 2023-09-21 PROCEDURE — 710N000010 HC RECOVERY PHASE 1, LEVEL 2, PER MIN: Performed by: NEUROLOGICAL SURGERY

## 2023-09-21 PROCEDURE — 278N000051 HC OR IMPLANT GENERAL: Performed by: NEUROLOGICAL SURGERY

## 2023-09-21 DEVICE — GRAFT DURAGEN 2X2" ID220: Type: IMPLANTABLE DEVICE | Site: BRAIN | Status: FUNCTIONAL

## 2023-09-21 RX ORDER — FENTANYL CITRATE 50 UG/ML
25 INJECTION, SOLUTION INTRAMUSCULAR; INTRAVENOUS EVERY 5 MIN PRN
Status: DISCONTINUED | OUTPATIENT
Start: 2023-09-21 | End: 2023-09-21 | Stop reason: HOSPADM

## 2023-09-21 RX ORDER — BISACODYL 10 MG
10 SUPPOSITORY, RECTAL RECTAL DAILY PRN
Status: DISCONTINUED | OUTPATIENT
Start: 2023-09-21 | End: 2023-09-26 | Stop reason: HOSPADM

## 2023-09-21 RX ORDER — NALOXONE HYDROCHLORIDE 0.4 MG/ML
0.2 INJECTION, SOLUTION INTRAMUSCULAR; INTRAVENOUS; SUBCUTANEOUS
Status: DISCONTINUED | OUTPATIENT
Start: 2023-09-21 | End: 2023-09-26 | Stop reason: HOSPADM

## 2023-09-21 RX ORDER — FENTANYL CITRATE 50 UG/ML
INJECTION, SOLUTION INTRAMUSCULAR; INTRAVENOUS PRN
Status: DISCONTINUED | OUTPATIENT
Start: 2023-09-21 | End: 2023-09-21

## 2023-09-21 RX ORDER — SODIUM CHLORIDE, SODIUM LACTATE, POTASSIUM CHLORIDE, CALCIUM CHLORIDE 600; 310; 30; 20 MG/100ML; MG/100ML; MG/100ML; MG/100ML
INJECTION, SOLUTION INTRAVENOUS CONTINUOUS PRN
Status: DISCONTINUED | OUTPATIENT
Start: 2023-09-21 | End: 2023-09-21

## 2023-09-21 RX ORDER — HYDROMORPHONE HCL IN WATER/PF 6 MG/30 ML
0.2 PATIENT CONTROLLED ANALGESIA SYRINGE INTRAVENOUS
Status: DISCONTINUED | OUTPATIENT
Start: 2023-09-21 | End: 2023-09-25

## 2023-09-21 RX ORDER — FENTANYL CITRATE 50 UG/ML
50 INJECTION, SOLUTION INTRAMUSCULAR; INTRAVENOUS EVERY 5 MIN PRN
Status: DISCONTINUED | OUTPATIENT
Start: 2023-09-21 | End: 2023-09-21 | Stop reason: HOSPADM

## 2023-09-21 RX ORDER — ONDANSETRON 2 MG/ML
INJECTION INTRAMUSCULAR; INTRAVENOUS PRN
Status: DISCONTINUED | OUTPATIENT
Start: 2023-09-21 | End: 2023-09-21

## 2023-09-21 RX ORDER — HYDROMORPHONE HCL IN WATER/PF 6 MG/30 ML
0.4 PATIENT CONTROLLED ANALGESIA SYRINGE INTRAVENOUS
Status: DISCONTINUED | OUTPATIENT
Start: 2023-09-21 | End: 2023-09-25

## 2023-09-21 RX ORDER — LIDOCAINE 40 MG/G
CREAM TOPICAL
Status: DISCONTINUED | OUTPATIENT
Start: 2023-09-21 | End: 2023-09-26 | Stop reason: HOSPADM

## 2023-09-21 RX ORDER — LIDOCAINE HYDROCHLORIDE AND EPINEPHRINE 10; 10 MG/ML; UG/ML
INJECTION, SOLUTION INFILTRATION; PERINEURAL PRN
Status: DISCONTINUED | OUTPATIENT
Start: 2023-09-21 | End: 2023-09-21 | Stop reason: HOSPADM

## 2023-09-21 RX ORDER — ESMOLOL HYDROCHLORIDE 10 MG/ML
INJECTION INTRAVENOUS PRN
Status: DISCONTINUED | OUTPATIENT
Start: 2023-09-21 | End: 2023-09-21

## 2023-09-21 RX ORDER — ONDANSETRON 4 MG/1
4 TABLET, ORALLY DISINTEGRATING ORAL EVERY 30 MIN PRN
Status: DISCONTINUED | OUTPATIENT
Start: 2023-09-21 | End: 2023-09-21 | Stop reason: HOSPADM

## 2023-09-21 RX ORDER — OXYCODONE HYDROCHLORIDE 5 MG/1
5 TABLET ORAL EVERY 4 HOURS PRN
Status: DISCONTINUED | OUTPATIENT
Start: 2023-09-21 | End: 2023-09-26 | Stop reason: HOSPADM

## 2023-09-21 RX ORDER — OXYMETAZOLINE HYDROCHLORIDE 0.05 G/100ML
SPRAY NASAL PRN
Status: DISCONTINUED | OUTPATIENT
Start: 2023-09-21 | End: 2023-09-21 | Stop reason: HOSPADM

## 2023-09-21 RX ORDER — ACETAMINOPHEN 325 MG/1
975 TABLET ORAL EVERY 8 HOURS
Status: COMPLETED | OUTPATIENT
Start: 2023-09-21 | End: 2023-09-24

## 2023-09-21 RX ORDER — CEFTRIAXONE 2 G/1
INJECTION, POWDER, FOR SOLUTION INTRAMUSCULAR; INTRAVENOUS PRN
Status: DISCONTINUED | OUTPATIENT
Start: 2023-09-21 | End: 2023-09-21

## 2023-09-21 RX ORDER — ONDANSETRON 4 MG/1
4 TABLET, ORALLY DISINTEGRATING ORAL EVERY 6 HOURS PRN
Status: DISCONTINUED | OUTPATIENT
Start: 2023-09-21 | End: 2023-09-26 | Stop reason: HOSPADM

## 2023-09-21 RX ORDER — LABETALOL HYDROCHLORIDE 5 MG/ML
10-40 INJECTION, SOLUTION INTRAVENOUS EVERY 10 MIN PRN
Status: DISCONTINUED | OUTPATIENT
Start: 2023-09-21 | End: 2023-09-26 | Stop reason: HOSPADM

## 2023-09-21 RX ORDER — NALOXONE HYDROCHLORIDE 0.4 MG/ML
0.4 INJECTION, SOLUTION INTRAMUSCULAR; INTRAVENOUS; SUBCUTANEOUS
Status: DISCONTINUED | OUTPATIENT
Start: 2023-09-21 | End: 2023-09-26 | Stop reason: HOSPADM

## 2023-09-21 RX ORDER — IOPAMIDOL 755 MG/ML
67 INJECTION, SOLUTION INTRAVASCULAR ONCE
Status: COMPLETED | OUTPATIENT
Start: 2023-09-21 | End: 2023-09-21

## 2023-09-21 RX ORDER — ACETAMINOPHEN 325 MG/1
650 TABLET ORAL EVERY 4 HOURS PRN
Status: DISCONTINUED | OUTPATIENT
Start: 2023-09-24 | End: 2023-09-26 | Stop reason: HOSPADM

## 2023-09-21 RX ORDER — SODIUM CHLORIDE, SODIUM LACTATE, POTASSIUM CHLORIDE, CALCIUM CHLORIDE 600; 310; 30; 20 MG/100ML; MG/100ML; MG/100ML; MG/100ML
INJECTION, SOLUTION INTRAVENOUS CONTINUOUS
Status: DISCONTINUED | OUTPATIENT
Start: 2023-09-21 | End: 2023-09-21 | Stop reason: HOSPADM

## 2023-09-21 RX ORDER — ONDANSETRON 2 MG/ML
4 INJECTION INTRAMUSCULAR; INTRAVENOUS EVERY 6 HOURS PRN
Status: DISCONTINUED | OUTPATIENT
Start: 2023-09-21 | End: 2023-09-25

## 2023-09-21 RX ORDER — SODIUM CHLORIDE 9 MG/ML
INJECTION, SOLUTION INTRAVENOUS CONTINUOUS
Status: ACTIVE | OUTPATIENT
Start: 2023-09-21 | End: 2023-09-22

## 2023-09-21 RX ORDER — HYDROMORPHONE HCL IN WATER/PF 6 MG/30 ML
0.2 PATIENT CONTROLLED ANALGESIA SYRINGE INTRAVENOUS EVERY 5 MIN PRN
Status: DISCONTINUED | OUTPATIENT
Start: 2023-09-21 | End: 2023-09-21 | Stop reason: HOSPADM

## 2023-09-21 RX ORDER — AMOXICILLIN 250 MG
2 CAPSULE ORAL 2 TIMES DAILY
Status: DISCONTINUED | OUTPATIENT
Start: 2023-09-21 | End: 2023-09-26 | Stop reason: HOSPADM

## 2023-09-21 RX ORDER — ONDANSETRON 2 MG/ML
4 INJECTION INTRAMUSCULAR; INTRAVENOUS EVERY 30 MIN PRN
Status: DISCONTINUED | OUTPATIENT
Start: 2023-09-21 | End: 2023-09-21 | Stop reason: HOSPADM

## 2023-09-21 RX ORDER — POLYETHYLENE GLYCOL 3350 17 G/17G
17 POWDER, FOR SOLUTION ORAL DAILY
Status: DISCONTINUED | OUTPATIENT
Start: 2023-09-22 | End: 2023-09-26 | Stop reason: HOSPADM

## 2023-09-21 RX ORDER — PROCHLORPERAZINE MALEATE 10 MG
10 TABLET ORAL EVERY 6 HOURS PRN
Status: DISCONTINUED | OUTPATIENT
Start: 2023-09-21 | End: 2023-09-26 | Stop reason: HOSPADM

## 2023-09-21 RX ORDER — HYDROMORPHONE HCL IN WATER/PF 6 MG/30 ML
0.4 PATIENT CONTROLLED ANALGESIA SYRINGE INTRAVENOUS EVERY 5 MIN PRN
Status: DISCONTINUED | OUTPATIENT
Start: 2023-09-21 | End: 2023-09-21 | Stop reason: HOSPADM

## 2023-09-21 RX ORDER — PROPOFOL 10 MG/ML
INJECTION, EMULSION INTRAVENOUS PRN
Status: DISCONTINUED | OUTPATIENT
Start: 2023-09-21 | End: 2023-09-21

## 2023-09-21 RX ORDER — CEFTRIAXONE 1 G/1
1 INJECTION, POWDER, FOR SOLUTION INTRAMUSCULAR; INTRAVENOUS EVERY 24 HOURS
Status: COMPLETED | OUTPATIENT
Start: 2023-09-22 | End: 2023-09-24

## 2023-09-21 RX ORDER — LEVOTHYROXINE SODIUM 75 UG/1
75 TABLET ORAL EVERY MORNING
Status: DISCONTINUED | OUTPATIENT
Start: 2023-09-22 | End: 2023-09-26 | Stop reason: HOSPADM

## 2023-09-21 RX ORDER — LIDOCAINE HYDROCHLORIDE 20 MG/ML
INJECTION, SOLUTION INFILTRATION; PERINEURAL PRN
Status: DISCONTINUED | OUTPATIENT
Start: 2023-09-21 | End: 2023-09-21

## 2023-09-21 RX ORDER — HYDRALAZINE HYDROCHLORIDE 20 MG/ML
10-20 INJECTION INTRAMUSCULAR; INTRAVENOUS EVERY 30 MIN PRN
Status: DISCONTINUED | OUTPATIENT
Start: 2023-09-21 | End: 2023-09-26 | Stop reason: HOSPADM

## 2023-09-21 RX ORDER — OXYCODONE HYDROCHLORIDE 10 MG/1
10 TABLET ORAL EVERY 4 HOURS PRN
Status: DISCONTINUED | OUTPATIENT
Start: 2023-09-21 | End: 2023-09-26 | Stop reason: HOSPADM

## 2023-09-21 RX ADMIN — SENNOSIDES AND DOCUSATE SODIUM 2 TABLET: 50; 8.6 TABLET ORAL at 21:11

## 2023-09-21 RX ADMIN — FENTANYL CITRATE 50 MCG: 50 INJECTION, SOLUTION INTRAMUSCULAR; INTRAVENOUS at 18:09

## 2023-09-21 RX ADMIN — ESMOLOL HYDROCHLORIDE 30 MG: 10 INJECTION, SOLUTION INTRAVENOUS at 11:47

## 2023-09-21 RX ADMIN — PHENYLEPHRINE HYDROCHLORIDE 100 MCG: 10 INJECTION INTRAVENOUS at 12:59

## 2023-09-21 RX ADMIN — SUGAMMADEX 200 MG: 100 INJECTION, SOLUTION INTRAVENOUS at 17:21

## 2023-09-21 RX ADMIN — Medication 2 G: at 08:22

## 2023-09-21 RX ADMIN — CEFTRIAXONE SODIUM 2 G: 2 INJECTION, POWDER, FOR SOLUTION INTRAMUSCULAR; INTRAVENOUS at 09:08

## 2023-09-21 RX ADMIN — PHENYLEPHRINE HYDROCHLORIDE 200 MCG: 10 INJECTION INTRAVENOUS at 09:24

## 2023-09-21 RX ADMIN — FENTANYL CITRATE 50 MCG: 50 INJECTION, SOLUTION INTRAMUSCULAR; INTRAVENOUS at 18:33

## 2023-09-21 RX ADMIN — PHENYLEPHRINE HYDROCHLORIDE 150 MCG: 10 INJECTION INTRAVENOUS at 13:50

## 2023-09-21 RX ADMIN — SODIUM CHLORIDE: 9 INJECTION, SOLUTION INTRAVENOUS at 19:23

## 2023-09-21 RX ADMIN — Medication 30 MG: at 10:46

## 2023-09-21 RX ADMIN — Medication 20 MG: at 08:40

## 2023-09-21 RX ADMIN — Medication 2 G: at 12:22

## 2023-09-21 RX ADMIN — ACETAMINOPHEN 975 MG: 325 TABLET, FILM COATED ORAL at 19:44

## 2023-09-21 RX ADMIN — PROPOFOL 200 MG: 10 INJECTION, EMULSION INTRAVENOUS at 07:47

## 2023-09-21 RX ADMIN — GABAPENTIN 300 MG: 300 CAPSULE ORAL at 06:30

## 2023-09-21 RX ADMIN — FENTANYL CITRATE 50 MCG: 50 INJECTION, SOLUTION INTRAMUSCULAR; INTRAVENOUS at 17:05

## 2023-09-21 RX ADMIN — HYDROCORTISONE SODIUM SUCCINATE 100 MG: 100 INJECTION, POWDER, FOR SOLUTION INTRAMUSCULAR; INTRAVENOUS at 08:25

## 2023-09-21 RX ADMIN — HYDROMORPHONE HYDROCHLORIDE 0.2 MG: 0.2 INJECTION, SOLUTION INTRAMUSCULAR; INTRAVENOUS; SUBCUTANEOUS at 19:30

## 2023-09-21 RX ADMIN — MIDAZOLAM 2 MG: 1 INJECTION INTRAMUSCULAR; INTRAVENOUS at 07:37

## 2023-09-21 RX ADMIN — Medication 50 MG: at 07:47

## 2023-09-21 RX ADMIN — IOPAMIDOL 67 ML: 755 INJECTION, SOLUTION INTRAVENOUS at 07:20

## 2023-09-21 RX ADMIN — SODIUM CHLORIDE, POTASSIUM CHLORIDE, SODIUM LACTATE AND CALCIUM CHLORIDE: 600; 310; 30; 20 INJECTION, SOLUTION INTRAVENOUS at 13:25

## 2023-09-21 RX ADMIN — Medication 20 MG: at 14:44

## 2023-09-21 RX ADMIN — FENTANYL CITRATE 50 MCG: 50 INJECTION, SOLUTION INTRAMUSCULAR; INTRAVENOUS at 10:00

## 2023-09-21 RX ADMIN — Medication 20 MG: at 09:24

## 2023-09-21 RX ADMIN — PHENYLEPHRINE HYDROCHLORIDE 100 MCG: 10 INJECTION INTRAVENOUS at 15:18

## 2023-09-21 RX ADMIN — PHENYLEPHRINE HYDROCHLORIDE 100 MCG: 10 INJECTION INTRAVENOUS at 13:38

## 2023-09-21 RX ADMIN — ONDANSETRON 4 MG: 2 INJECTION INTRAMUSCULAR; INTRAVENOUS at 16:32

## 2023-09-21 RX ADMIN — PHENYLEPHRINE HYDROCHLORIDE 100 MCG: 10 INJECTION INTRAVENOUS at 13:07

## 2023-09-21 RX ADMIN — PHENYLEPHRINE HYDROCHLORIDE 100 MCG: 10 INJECTION INTRAVENOUS at 15:01

## 2023-09-21 RX ADMIN — OXYCODONE HYDROCHLORIDE 5 MG: 5 TABLET ORAL at 19:44

## 2023-09-21 RX ADMIN — Medication 20 MG: at 12:20

## 2023-09-21 RX ADMIN — LIDOCAINE HYDROCHLORIDE 100 MG: 20 INJECTION, SOLUTION INFILTRATION; PERINEURAL at 07:47

## 2023-09-21 RX ADMIN — Medication 2 G: at 16:23

## 2023-09-21 RX ADMIN — SODIUM CHLORIDE, POTASSIUM CHLORIDE, SODIUM LACTATE AND CALCIUM CHLORIDE: 600; 310; 30; 20 INJECTION, SOLUTION INTRAVENOUS at 09:25

## 2023-09-21 RX ADMIN — Medication 20 MG: at 13:37

## 2023-09-21 RX ADMIN — SODIUM CHLORIDE, POTASSIUM CHLORIDE, SODIUM LACTATE AND CALCIUM CHLORIDE: 600; 310; 30; 20 INJECTION, SOLUTION INTRAVENOUS at 07:37

## 2023-09-21 RX ADMIN — FENTANYL CITRATE 150 MCG: 50 INJECTION, SOLUTION INTRAMUSCULAR; INTRAVENOUS at 08:08

## 2023-09-21 RX ADMIN — Medication 20 MG: at 15:43

## 2023-09-21 RX ADMIN — FENTANYL CITRATE 50 MCG: 50 INJECTION, SOLUTION INTRAMUSCULAR; INTRAVENOUS at 07:47

## 2023-09-21 RX ADMIN — FENTANYL CITRATE 50 MCG: 50 INJECTION, SOLUTION INTRAMUSCULAR; INTRAVENOUS at 17:49

## 2023-09-21 RX ADMIN — Medication 20 MG: at 16:19

## 2023-09-21 RX ADMIN — ACETAMINOPHEN 975 MG: 325 TABLET ORAL at 06:30

## 2023-09-21 RX ADMIN — PHENYLEPHRINE HYDROCHLORIDE 200 MCG: 10 INJECTION INTRAVENOUS at 08:57

## 2023-09-21 RX ADMIN — HYDROMORPHONE HYDROCHLORIDE 0.4 MG: 0.2 INJECTION, SOLUTION INTRAMUSCULAR; INTRAVENOUS; SUBCUTANEOUS at 18:49

## 2023-09-21 ASSESSMENT — VISUAL ACUITY
OU: BASELINE

## 2023-09-21 ASSESSMENT — ACTIVITIES OF DAILY LIVING (ADL)
ADLS_ACUITY_SCORE: 20

## 2023-09-21 NOTE — DISCHARGE SUMMARY
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    Weight as of this encounter: 96.7 kg (213 lb 3.2 oz).                  Procedures:   Stealth-assisted Endoscopic endonasal transsellar approach for tumor, nasoseptal flap on 9/21/2023            Brief History of Illness:   Mr. Yony Loera is a 48-year-old right-handed gentleman who noticed left peripheral vision loss in the left eye as well as central blurring.  Patient also reported headaches that were thought to be due to sinus congestion initially.  He was evaluated by ophthalmologist-Dr. Claire and was found to have slightly worse visual acuity in the left eye and visual field testing showed a superior defect in the right antiproliferative the left eye.  Further work-up was performed.  MRI showed a large pituitary macroadenoma involving the pterygoids, clivus, sphenoid sinus, sella and suprasellar cistern with mass effect on the optic chiasm and invasion of the bilateral cavernous sinuses.            Hospital Course:   Postoperatively the patient was transferred to .   Patient denied headache, nausea or vomiting, and vision changes following surgery.    Endocrinology followed patient throughout her stay.  Patient necessitated supplemental steroid use.  Serum Cortisol levels were noted to be low.  Patient did display signs of Diabetes Insipidus and got one dose of " DDVAP on POD 1, but then improved.   Patient counseled to continue to drink to thirst.      ENT followed patient, no signs of CSF leak were noted. Patient denied salty/metallic taste in mouth.  Patient instructed on sinus precautions as listed below. There was some serosanguinous discharge from the nose, and beta 2 was sent and was indeterminate.    PT and OT evaluated patient following surgery and felt that he was appropriate to go home.     Patient was ambulating independently, voiding without ye, passing bowel movements, pain was adequately controlled on oral analgesia, and patient was medically and neurologically stable upon discharge.    Surgical pathology was pending at time of discharge.     Patient will need to follow up with Endocrinology in 2 weeks.  Patient will follow up in 2 weeks in ENT clinic and neurosurgery     Please follow sinus precautions upon discharge until seen by ENT, precautions as follows:   1.  Do not blow nose  2.  Dab gently under the nose to collect drainage  3.  Do not pick nose or insert tissues/cloths inside of nose  4.  Sneeze with mouth open  5.  Do not strain, please take stool softeners as prescribed    Gen: Appears comfortable, NAD  Wound: clean, dry, intact  Neurologic:  - Alert & Oriented to person, place, time, and situation  - Follows commands briskly  - Speech fluent, spontaneous. No aphasia or dysarthria.  - No gaze preference. No apparent hemineglect.  - PERRL, EOMI  - Face symmetric with sensation intact to light touch  - Palate elevates symmetrically, uvula midline, tongue protrudes midline  - Trapezii muscles 5/5 bilaterally  - No pronator drift       Del Tr Bi WE WF Gr   R 5 5 5 5 5 5   L 5 5 5 5 5 5     HF KE KF DF PF EHL   R 5 5 5 5 5 5   L 5 5 5 5 5 5      Reflexes 2+ throughout     Sensation intact and symmetric to light touch throughout         Discharge Medications:     Current Discharge Medication List        START taking these medications    Details    acetaminophen (TYLENOL) 325 MG tablet Take 2 tablets (650 mg) by mouth every 4 hours as needed for other (For optimal non-opioid multimodal pain management to improve pain control.)  Qty: 30 tablet, Refills: 0    Associated Diagnoses: Pituitary macroadenoma (H)      dexAMETHasone (DECADRON) 4 MG/ML injection Use 4 mg or dose determined by provider for iontophoresis.  Qty: 1 mL, Refills: 3    Comments: Can we dispense the injection kit. Rescue injection, only in case when patient is not able to tolerate oral hydrocortisone  Associated Diagnoses: Pituitary macroadenoma (H)      !! hydrocortisone (CORTEF) 5 MG tablet Take 3 tablets (15 mg) by mouth daily  Qty: 60 tablet, Refills: 0    Comments: Morning  Associated Diagnoses: Pituitary macroadenoma (H)      !! hydrocortisone (CORTEF) 5 MG tablet Take 1 tablet (5 mg) by mouth every 24 hours  Qty: 60 tablet, Refills: 0    Comments: Evening  Associated Diagnoses: Pituitary macroadenoma (H)      oxyCODONE (ROXICODONE) 5 MG tablet Take 1 tablet (5 mg) by mouth every 4 hours as needed for moderate pain  Qty: 10 tablet, Refills: 0    Associated Diagnoses: Pituitary macroadenoma (H)      polyethylene glycol (MIRALAX) 17 GM/Dose powder Take 17 g by mouth daily  Qty: 510 g, Refills: 0    Associated Diagnoses: Pituitary macroadenoma (H)      sodium chloride (OCEAN) 0.65 % nasal spray Please use two sprays in each nostril every two hours while awake.  Qty: 88 mL, Refills: 3    Associated Diagnoses: Pituitary adenoma (H)       !! - Potential duplicate medications found. Please discuss with provider.        CONTINUE these medications which have NOT CHANGED    Details   cetirizine (ZYRTEC) 10 MG tablet Take 1 tablet by mouth daily as needed      levothyroxine (SYNTHROID/LEVOTHROID) 75 MCG tablet Take 1 tablet (75 mcg) by mouth daily  Qty: 90 tablet, Refills: 3    Associated Diagnoses: Pituitary adenoma with extrasellar extension (H)      multivitamin w/minerals (MULTI-VITAMIN)  tablet Take 1 tablet by mouth every evening                     Discharge Instructions and Follow-Up:     Discharge diet: Regular   Discharge activity: You may advance activity as tolerated. No strenuous exercise or heay lifting greater than 10 lbs for 4 weeks or until seen and cleared in clinic.   Discharge follow-up: Patient will need to follow up with Endocrinology in 2 weeks.  Patient will follow up in 2 weeks in ENT clinic and neurosurgery          Please call if you have:  1. increased pain, redness, drainage, swelling at your incision  2. fevers > 101.5 F degrees  3. with any questions or concerns.  You may reach the Neurosurgery clinic at [phone] during regular work hours. ER at [phone].    and ask for the Neurosurgery Resident on call at [phone], during off hours or weekends.           Discharge Disposition:     Discharged to home        Adolfo Lozada MD  Neurosurgery Resident  Pager: [phone]

## 2023-09-21 NOTE — ANESTHESIA CARE TRANSFER NOTE
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    Anesthesia Type:   General     Note:    Oropharynx: oropharynx clear of all foreign objects  Level of Consciousness: drowsy  Oxygen Supplementation: nasal cannula    Independent Airway: airway patency satisfactory and stable  Dentition: dentition unchanged  Vital Signs Stable: post-procedure vital signs reviewed and stable  Report to RN Given: handoff report given  Patient transferred to: PACU    Handoff Report: Identifed the Patient, Identified the Reponsible Provider, Reviewed the pertinent medical history, Discussed the surgical course, Reviewed Intra-OP anesthesia mangement and issues during anesthesia, Set expectations for post-procedure period and Allowed opportunity for questions and acknowledgement of understanding      Vitals:  Vitals Value Taken Time   /79 09/21/23 1830   Temp     Pulse 68 09/21/23 1831   Resp 8 09/21/23 1831   SpO2 99 % 09/21/23 1831   Vitals shown include unvalidated device data.    Electronically Signed By: Noe Barnett MD  September 21, 2023  6:32 PM

## 2023-09-21 NOTE — ANESTHESIA PROCEDURE NOTES
Airway       Patient location during procedure: OR       Procedure Start/Stop Times: 9/21/2023 7:49 AM  Staff -        CRNA: Mami Bowman APRN CRNA       Performed By: CRNA  Consent for Airway        Urgency: elective  Indications and Patient Condition       Indications for airway management: susan-procedural       Induction type:intravenous       Mask difficulty assessment: 2 - vent by mask + OA or adjuvant +/- NMBA    Final Airway Details       Final airway type: endotracheal airway       Successful airway: ETT - single  Endotracheal Airway Details        ETT size (mm): 8.0       Cuffed: yes       Successful intubation technique: direct laryngoscopy       DL Blade Type: MAC 3       Grade View of Cords: 1       Adjucts: stylet       Position: Right       Measured from: lips       Bite block used: Soft    Post intubation assessment        Placement verified by: capnometry, equal breath sounds and chest rise        Number of attempts at approach: 1       Secured with: cloth tape       Ease of procedure: easy       Dentition: Intact and Unchanged    Medication(s) Administered   Medication Administration Time: 9/21/2023 7:49 AM

## 2023-09-21 NOTE — BRIEF OP NOTE
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]   Procedure: Procedure(s):  stealth assisted Endoscopic endonasal transsellar transclival approach for tumor, nasoseptal flap   Surgeon(s): Surgeon(s) and Role:     * Sabas Lacy MD - Primary     * Gary Tavares MD - Assisting     * Aubrey Davenport MD - Resident - Assisting   Estimated blood loss: 200 mL    Specimens: ID Type Source Tests Collected by Time Destination   1 : Sellar Tumor Tissue Brain RESEARCH SPECIMEN FOR BIONET TESTING Sabas Lacy MD 9/21/2023 12:11 PM    2 : Sellar Tumor Tissue Brain SURGICAL PATHOLOGY EXAM Sabas Lacy MD 9/21/2023 12:13 PM    3 : Sellar Tumor #2 Tissue Brain SURGICAL PATHOLOGY EXAM Sabas Lacy MD 9/21/2023 12:14 PM    4 : Clival Bone Tissue Brain SURGICAL PATHOLOGY EXAM Sabas Lacy MD 9/21/2023  2:08 PM       Findings: Good resection of tumor. Small low flow CSF leak.

## 2023-09-21 NOTE — ANESTHESIA PROCEDURE NOTES
Arterial Line Procedure Note    Pre-Procedure   Staff -        Anesthesiologist:  Noe Barnett MD       Resident/Fellow: Ashley Garcia MD       Performed By: resident       Location: OR  Timeout:       Correct Patient: Yes        Correct Procedure: Yes        Correct Site: Yes        Correct Position: Yes   Line Placement:   This line was placed Post Induction  Procedure   Procedure: arterial line       Laterality: left       Insertion Site: radial.  Sterile Prep        Standard elements of sterile barrier followed       Skin prep: Chloraprep  Insertion/Injection        Technique: ultrasound guided        1. Ultrasound was used to evaluate the access site.       2. Artery evaluated via ultrasound for patency/adequacy.       3. Using real-time ultrasound the needle/catheter was observed entering the artery/vein.       Catheter Type/Size: 20 G, 1.75 in/4.5 cm quick cath (integral wire)  Narrative        Tegaderm dressing used.       Complications: None apparent,        Arterial waveform: Yes        IBP within 10% of NIBP: Yes

## 2023-09-21 NOTE — ANESTHESIA PROCEDURE NOTES
Arterial Line Procedure Note    Pre-Procedure   Staff -        Anesthesiologist:  Noe Barnett MD       Resident/Fellow: Ashley Garcia MD       Performed By: resident       Location: OR       Pre-Anesthestic Checklist: patient identified, IV checked, risks and benefits discussed, informed consent, monitors and equipment checked, pre-op evaluation and at physician/surgeon's request  Timeout:       Correct Patient: Yes        Correct Procedure: Yes        Correct Site: Yes        Correct Position: Yes   Line Placement:   This line was placed Post Induction  Procedure   Procedure: arterial line       Laterality: left

## 2023-09-21 NOTE — ANESTHESIA POSTPROCEDURE EVALUATION
Patient: Yony Loera    Procedure: Procedure(s):  stealth assisted Endoscopic endonasal transsellar transclival approach for tumor, nasoseptal flap       Anesthesia Type:  General    Note:  Disposition: Admission   Postop Pain Control: Uneventful            Sign Out: Well controlled pain   PONV: No   Neuro/Psych: Uneventful            Sign Out: Acceptable/Baseline neuro status   Airway/Respiratory: Uneventful            Sign Out: Acceptable/Baseline resp. status   CV/Hemodynamics: Uneventful            Sign Out: Acceptable CV status; No obvious hypovolemia; No obvious fluid overload   Other NRE: NONE   DID A NON-ROUTINE EVENT OCCUR? No           Last vitals:  Vitals Value Taken Time   /79 09/21/23 1830   Temp     Pulse 75 09/21/23 1832   Resp 16 09/21/23 1832   SpO2 98 % 09/21/23 1832   Vitals shown include unvalidated device data.    Electronically Signed By: Noe Barnett MD  September 21, 2023  6:33 PM

## 2023-09-22 ENCOUNTER — APPOINTMENT (OUTPATIENT)
Dept: OCCUPATIONAL THERAPY | Facility: CLINIC | Age: 48
End: 2023-09-22
Attending: STUDENT IN AN ORGANIZED HEALTH CARE EDUCATION/TRAINING PROGRAM
Payer: COMMERCIAL

## 2023-09-22 LAB
ANION GAP SERPL CALCULATED.3IONS-SCNC: 12 MMOL/L (ref 7–15)
BUN SERPL-MCNC: 15 MG/DL (ref 6–20)
CALCIUM SERPL-MCNC: 9.1 MG/DL (ref 8.6–10)
CHLORIDE SERPL-SCNC: 109 MMOL/L (ref 98–107)
CREAT SERPL-MCNC: 1.04 MG/DL (ref 0.67–1.17)
DEPRECATED HCO3 PLAS-SCNC: 22 MMOL/L (ref 22–29)
EGFRCR SERPLBLD CKD-EPI 2021: 87 ML/MIN/1.73M2
GLUCOSE BLDC GLUCOMTR-MCNC: 149 MG/DL (ref 70–99)
GLUCOSE SERPL-MCNC: 146 MG/DL (ref 70–99)
OSMOLALITY SERPL: 300 MMOL/KG (ref 275–295)
OSMOLALITY UR: 77 MMOL/KG (ref 100–1200)
POTASSIUM SERPL-SCNC: 3.4 MMOL/L (ref 3.4–5.3)
POTASSIUM SERPL-SCNC: 4.1 MMOL/L (ref 3.4–5.3)
SODIUM SERPL-SCNC: 143 MMOL/L (ref 136–145)
SODIUM SERPL-SCNC: 147 MMOL/L (ref 136–145)
SODIUM SERPL-SCNC: 147 MMOL/L (ref 136–145)
SODIUM UR-SCNC: <20 MMOL/L
SP GR UR STRIP: 1 (ref 1–1.03)

## 2023-09-22 PROCEDURE — 36415 COLL VENOUS BLD VENIPUNCTURE: CPT | Performed by: NEUROLOGICAL SURGERY

## 2023-09-22 PROCEDURE — A9585 GADOBUTROL INJECTION: HCPCS | Mod: JZ | Performed by: NEUROLOGICAL SURGERY

## 2023-09-22 PROCEDURE — 255N000002 HC RX 255 OP 636: Mod: JZ | Performed by: NEUROLOGICAL SURGERY

## 2023-09-22 PROCEDURE — 83930 ASSAY OF BLOOD OSMOLALITY: CPT | Performed by: NURSE PRACTITIONER

## 2023-09-22 PROCEDURE — 36415 COLL VENOUS BLD VENIPUNCTURE: CPT | Performed by: NURSE PRACTITIONER

## 2023-09-22 PROCEDURE — 97535 SELF CARE MNGMENT TRAINING: CPT | Mod: GO | Performed by: OCCUPATIONAL THERAPIST

## 2023-09-22 PROCEDURE — 120N000002 HC R&B MED SURG/OB UMMC

## 2023-09-22 PROCEDURE — 84300 ASSAY OF URINE SODIUM: CPT | Performed by: NURSE PRACTITIONER

## 2023-09-22 PROCEDURE — 97165 OT EVAL LOW COMPLEX 30 MIN: CPT | Mod: GO | Performed by: OCCUPATIONAL THERAPIST

## 2023-09-22 PROCEDURE — 250N000011 HC RX IP 250 OP 636: Mod: JZ

## 2023-09-22 PROCEDURE — 84132 ASSAY OF SERUM POTASSIUM: CPT | Performed by: NEUROLOGICAL SURGERY

## 2023-09-22 PROCEDURE — 97530 THERAPEUTIC ACTIVITIES: CPT | Mod: GO | Performed by: OCCUPATIONAL THERAPIST

## 2023-09-22 PROCEDURE — 81003 URINALYSIS AUTO W/O SCOPE: CPT | Performed by: STUDENT IN AN ORGANIZED HEALTH CARE EDUCATION/TRAINING PROGRAM

## 2023-09-22 PROCEDURE — 250N000013 HC RX MED GY IP 250 OP 250 PS 637: Performed by: NEUROLOGICAL SURGERY

## 2023-09-22 PROCEDURE — 83935 ASSAY OF URINE OSMOLALITY: CPT | Performed by: NURSE PRACTITIONER

## 2023-09-22 PROCEDURE — 999N000147 HC STATISTIC PT IP EVAL DEFER

## 2023-09-22 PROCEDURE — 250N000013 HC RX MED GY IP 250 OP 250 PS 637: Performed by: STUDENT IN AN ORGANIZED HEALTH CARE EDUCATION/TRAINING PROGRAM

## 2023-09-22 PROCEDURE — 84295 ASSAY OF SERUM SODIUM: CPT | Performed by: NURSE PRACTITIONER

## 2023-09-22 PROCEDURE — 80048 BASIC METABOLIC PNL TOTAL CA: CPT | Performed by: NURSE PRACTITIONER

## 2023-09-22 RX ORDER — POTASSIUM CHLORIDE 750 MG/1
40 TABLET, EXTENDED RELEASE ORAL ONCE
Status: COMPLETED | OUTPATIENT
Start: 2023-09-22 | End: 2023-09-22

## 2023-09-22 RX ORDER — DESMOPRESSIN ACETATE 4 UG/ML
1 INJECTION, SOLUTION INTRAVENOUS; SUBCUTANEOUS ONCE
Status: COMPLETED | OUTPATIENT
Start: 2023-09-23 | End: 2023-09-23

## 2023-09-22 RX ORDER — GADOBUTROL 604.72 MG/ML
10 INJECTION INTRAVENOUS ONCE
Status: DISCONTINUED | OUTPATIENT
Start: 2023-09-22 | End: 2023-09-26 | Stop reason: HOSPADM

## 2023-09-22 RX ADMIN — CEFTRIAXONE SODIUM 1 G: 1 INJECTION, POWDER, FOR SOLUTION INTRAMUSCULAR; INTRAVENOUS at 07:58

## 2023-09-22 RX ADMIN — SENNOSIDES AND DOCUSATE SODIUM 2 TABLET: 50; 8.6 TABLET ORAL at 07:58

## 2023-09-22 RX ADMIN — LEVOTHYROXINE SODIUM 75 MCG: 75 TABLET ORAL at 07:58

## 2023-09-22 RX ADMIN — POLYETHYLENE GLYCOL 3350 17 G: 17 POWDER, FOR SOLUTION ORAL at 07:58

## 2023-09-22 RX ADMIN — ACETAMINOPHEN 975 MG: 325 TABLET, FILM COATED ORAL at 19:59

## 2023-09-22 RX ADMIN — POTASSIUM CHLORIDE 40 MEQ: 750 TABLET, EXTENDED RELEASE ORAL at 11:58

## 2023-09-22 RX ADMIN — SENNOSIDES AND DOCUSATE SODIUM 2 TABLET: 50; 8.6 TABLET ORAL at 19:59

## 2023-09-22 RX ADMIN — ACETAMINOPHEN 975 MG: 325 TABLET, FILM COATED ORAL at 03:02

## 2023-09-22 RX ADMIN — ACETAMINOPHEN 975 MG: 325 TABLET, FILM COATED ORAL at 11:58

## 2023-09-22 RX ADMIN — OXYCODONE HYDROCHLORIDE 5 MG: 5 TABLET ORAL at 00:00

## 2023-09-22 ASSESSMENT — ACTIVITIES OF DAILY LIVING (ADL)
ADLS_ACUITY_SCORE: 22
ADLS_ACUITY_SCORE: 20
ADLS_ACUITY_SCORE: 22
ADLS_ACUITY_SCORE: 20

## 2023-09-22 NOTE — PROVIDER NOTIFICATION
RN came into room and emptied catheter for 2.5L, color is very clear/light yellow. NSG team paged, spec grav sent down. Team plans to order more labs and assess pt as he reported some salty/metallic taste. All neuros intact and no change from previous shift, last time ye emptied @ 0400, but 2.5L w/in [id number].

## 2023-09-22 NOTE — OP NOTE
[facility]  Department of Neurosurgery  Operative Report    Procedure date: 9/21/2023    Preoperative diagnosis:  1.  Pituitary macroadenoma  2.  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  Hypothyroidism    Postop diagnosis:  1.  Pituitary macroadenoma  2.  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  Hypothyroidism    Procedures performed:  1. Endoscopic endonasal transclival, transpterygoid approach to middle and posterior fossa  2. Extradural resection of tumor from middle and posterior fossa  3. Intraoperative stereotactic neuro navigation  4. Intraoperative neuro monitoring for SSEPs and EEGs    Surgeon: Sabas Lacy MD PhD    Co-surgeon: Gary Ho MD    : Aubrey Davenport MD    Estimated blood loss: 200 cc    Indications for procedure:  Mr. Yony Loera is a 48-year-old right-handed gentleman who noticed left peripheral vision loss in the left eye as well as central blurring.  Patient also reported headaches that were thought to be due to sinus congestion initially.  He was evaluated by ophthalmologist-Dr. Claire and was found to have slightly worse visual acuity in the left eye and visual field testing showed a superior defect in the right antiproliferative the left eye.  Further work-up was performed.  MRI showed a large pituitary macroadenoma involving the pterygoids, clivus, sphenoid sinus, sella and suprasellar cistern with mass effect on the optic chiasm and invasion of the bilateral cavernous sinuses.  Patient was then referred to our skull base clinic for further evaluation.  We offered the above-mentioned procedure to the patient.  All the risk and benefits of the procedure were discussed with the patient in detail.  Patient agreed to undergo the above-mentioned procedure.  Informed consent was obtained.    Details of the procedure:  After informed consent was obtained, the patient was brought to the operating room and  placed supine on the operating room table.  The anesthesia team performed an intubation and administered 100 mg of hydrocortisone given the relatively low cortisol preoperatively, who also established intravenous and intra-arterial access.  We requested that MAPs be kept above 70 preinduction and during the procedure. The bed was turned 180 degrees.  The patient's head was placed in a [place] head proctor and turned slightly towards the right side with mild extension.  CT head and CTA head and neck that were performed in the preoperative area were now loaded to the neuro navigation machine and stereotactic neuro navigation was registered and its accuracy was confirmed.  The [employer] team neurophysiologist then placed leads to monitor EEGs and SSEPs.  The midface, left abdomen and left thigh were sterilely prepped and draped in the usual sterile fashion.  The patient received antibiotic prophylactically.  A timeout was performed to confirm the details of the procedure.    After timeout was performed, Dr. Ho then initiated the approach.  This included a wide sphenoidotomy, maxillary antrostomy, left nasal septal flap harvest and a posterior septectomy.  Please see Dr. Ho's note for further details.      After a wide exposure was obtained, the neurosurgery team was called to the room and we scrubbed in.  We used neuro navigation to confirm our landmarks.  We then started to remove the remaining bone over the sella using Kerrison rongeurs.  Of note, the tumor that was involving the sphenoid sinus was debulked that was started by our colleague Dr. Ho.  We first delineated the rostrum of the sphenoid and drilled this down.  This allowed us access towards the right middle fossa and clivus, which was eroded by the middle and posterior fossa component of tumor.  We identified soft tumor in this area that was mixed with firm and vascularized areas as well.  We debulk the center of the tumor.  We then peeled the  inferior pole of the tumor off the lower clivus.  The tumor was invading the bone and eroded down towards the dura.  The clival bone also appeared heavily remodeled by tumor and was soft.  We then worked towards the right side along the lower clivus.  We removed tumor and identified the vidian nerve, whose canal was completely eroded by tumor.  We remove the tumor both below and above the vidian nerve, keeping the vidian nerve intact.  We then worked towards the inferior left-hand side along the clivus.  We were able to achieve an excellent resection and identified the tumor capsule which was folded inward.  We resected this portion completely.  We then worked towards the level of foramen lacerum on both sides.  The left foramen lacerum was cleared, however the tumor had infiltrated behind the carotid artery at foramen lacerum on the right side.  We were able to remove the tumor behind the right carotid artery at foramen lacerum and angled instruments.  We then focused towards the dorsal aspect of the tumor.  We rolled the posterior aspect of the tumor capsule off of the clival dura.  We confirmed our depth using neuro navigation.  The dura appeared attenuated and was thinned in 1 area but no clear posterior fossa CSF leak along the clival dura was seen.      We then continued dissection superiorly.  In the sphenoid, there was additional tumor that had eroded through the sellar dura.  We remove this portion completely and identified a clear bony edges along the cavernous sinuses bilaterally.  The carotid arteries were exposed on both sides as the tumor had eroded the bone in this area.  We then removed the tumor in the sphenoid such that only the sella dura was seen.  We then opened the sella dura sharply.  We debulked the center of the tumor in the sella and then identified the inferior tumor capsule off of the inferior dura of the sella.  We rolled the tumor capsule along the left lateral gutter and we identified  the medial cavernous sinus wall clearly.  We identified the cavernous carotid artery which was well protected.  We then moved towards the right lateral gutter.  There was evidence of cavernous sinus invasion in this area so we meticulously remove the tumor along the medial cavernous sinus and right sella gutter.  We then identified the dorsum sella dura.  We then worked superiorly towards the suprasellar portion of tumor.  We gradually debulk the center of the tumor and it gradually fell down into the sella.  We ident divide the tumor capsule margin off of the pituitary gland and thinned diaphragm.  Using meticulous dissection, we are able to roll the tumor capsule off of the thinned pituitary gland and diaphragm.  We were successful in doing this moving from the left side to the right side.  This allowed us to remove the final tumor portion.  We then reinspected the area and found no evidence of residual tumor that could be removed.  We then irrigated copiously and obtained hemostasis.  Surgicel was used to line the right medial aspect of the cavernous sinus to control a small area of bleeding in this area.  We then irrigated copiously again.  Only a small very low flow CSF leak was seen along the superior and ventral aspect of the diaphragm.      We therefore turned to closure.  A DuraGen graft was cut to size and was placed within the solid defect and was tucked into the bony margin over the right cavernous sinus.  There is no bone along the inferior or left hand sides to tuck the duragen on this side. We therefore lined this with Surgicel.  Dr. Ho then placed the nasoseptal flap over the area and performed the remainder of the closure.  Please see Dr. Ho's note for further details.      After the procedure was complete, the sterile drapes were removed and the patient's head was released from the [place] head proctor and the head of the bed was replaced.  The patient was returned to the anesthesia team  for extubation.  The patient was successfully extubated and brought to the recovery area for further monitoring.    Dr. Lacy was scrubbed and present throughout the critical portions of the neurosurgical portion of the procedure, and remained immediately available throughout.    - Aubrey [facility]  Neurosurgery Resident PGY3      I was present during the key portions of the neurosurgical portion of the operation and I was immediately available for the entire procedure. I spoke with the patient's spouse for an update upon conclusion of the procedure.    Sabas Lacy MD PhD

## 2023-09-22 NOTE — PLAN OF CARE
Arrived from: PACU  Belongings/meds: Clothes, shoes. Phone/.   2 RN Skin Assessment Completed by: Mahad RN and Bhavesh RN.   Non-intact findings documented (yes/no/NA): Yes.

## 2023-09-22 NOTE — PHARMACY-ADMISSION MEDICATION HISTORY
Pharmacist Admission Medication History    Admission medication history is complete. The information provided in this note is only as accurate as the sources available at the time of the update.    Medication reconciliation/reorder completed by provider prior to medication history? Yes    Information Source(s): Reviewed medication fill history - only recent medication patient has fill history for levothyroxine 75 mcg tablets; Pre-op nursing last times of administration     Changes made to PTA medication list:  Added: None  Deleted: None  Changed: None    Prior to Admission medications    Medication Sig Last Dose Taking? Auth Provider Long Term End Date   cetirizine (ZYRTEC) 10 MG tablet Take 1 tablet by mouth daily as needed Past Month Yes Reported, Patient     levothyroxine (SYNTHROID/LEVOTHROID) 75 MCG tablet Take 1 tablet (75 mcg) by mouth daily  Patient taking differently: Take 75 mcg by mouth every morning 9/21/2023 at 0430 Yes Delilah Mcgrath MD Yes    multivitamin w/minerals (MULTI-VITAMIN) tablet Take 1 tablet by mouth every evening Past Week Yes Reported, Patient       Medication History Completed By: Sabas Burks RPH 9/22/2023 2:40 PM

## 2023-09-22 NOTE — PROGRESS NOTES
09/22/23 0900   Appointment Info   Signing Clinician's Name / Credentials (OT) yann saba rot/l   Living Environment   People in Home child(mike), dependent;spouse   Current Living Arrangements house   Home Accessibility stairs to enter home;stairs within home   Number of Stairs, Main Entrance 2   Number of Stairs, Within Home, Primary greater than 10 stairs   Transportation Anticipated car, drives self;family or friend will provide   Self-Care   Usual Activity Tolerance good   Current Activity Tolerance good   Equipment Currently Used at Home none   Fall history within last six months no   General Information   Referring Physician Aubrey Davenport   Patient/Family Therapy Goal Statement (OT) return to PLOF   Additional Occupational Profile Info/Pertinent History of Current Problem 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   Visual Perception   Visual Impairment/Limitations WFL   Impact of Vision Impairment on Function (Vision) pt with no peripheral deficits, mild blurriness in L visual field however pt reports it is much better than before surgery. pt albe to read small, newspaper size print.   Sensory   Sensory Quick Adds sensation intact   Range of Motion Comprehensive   General Range of Motion no range of motion deficits identified   Strength Comprehensive (MMT)   General Manual Muscle Testing (MMT) Assessment other (see comments)   Comment, General Manual Muscle Testing (MMT) Assessment overall deocnditioning from hospital stay   Coordination   Coordination Comments no concerns.   Bed Mobility   Bed Mobility supine-sit;sit-supine   Supine-Sit Luther (Bed Mobility) independent   Transfers   Transfers sit-stand transfer   Sit-Stand Transfer   Sit-Stand Luther (Transfers) independent    Balance   Balance Assessment standing balance: dynamic   Balance Comments no deficits.   Activities of Daily Living   BADL Assessment/Intervention lower body dressing   Lower Body Dressing Assessment/Training   Comment, (Lower Body Dressing) education required   Clinical Impression   Criteria for Skilled Therapeutic Interventions Met (OT) Yes, treatment indicated   OT Diagnosis structure at risk of further injury, deocnditioning leading to decreased ADL i   Assessment of Occupational Performance 3-5 Performance Deficits   Identified Performance Deficits dressing, bathing, community mobility, leisure, home making.   Clinical Decision Making Complexity (OT) low complexity   Risk & Benefits of therapy have been explained evaluation/treatment results reviewed;care plan/treatment goals reviewed;risks/benefits reviewed;current/potential barriers reviewed;participants voiced agreement with care plan;participants included;patient   Clinical Impression Comments Pt presents to OT with post surgicla precautions and general deconditioning leading to decreased ADL I/safety.   OT Total Evaluation Time   OT Eval, Low Complexity Minutes (10881) 3   OT Goals   Therapy Frequency (OT) One time eval and treatment   OT Predicted Duration/Target Date for Goal Attainment 09/22/23   OT Goals Lower Body Dressing;OT Goal 1;OT Goal 2   OT: Lower Body Dressing Modified independent;within precautions   OT: Goal 1 pt will ascend/descend 10 stairs mod I   OT: Goal 2 Pt will demo understanding of sinus precuations wthing ADL and functional transfers.   OT Discharge Planning   OT Plan DISCH   OT Discharge Recommendation (DC Rec) home with assist   OT Rationale for DC Rec pt progressing well   OT Brief overview of current status Pt I with mobility, I wiht stairs, dressing, understanding of precautions.   Total Session Time   Total Session Time (sum of timed and untimed services) 3

## 2023-09-22 NOTE — PLAN OF CARE
Occupational Therapy Discharge Summary    Reason for therapy discharge:    All goals and outcomes met, no further needs identified.    Progress towards therapy goal(s). See goals on Care Plan in [facility] electronic health record for goal details.  Goals met    Therapy recommendation(s):    No further therapy is recommended.

## 2023-09-22 NOTE — PLAN OF CARE
Pt POD#1 TSS, vss, neuros include: intact. PIV SL between ABX, regular diet w/good PO intake, voids large volumes via ye catheter, no BM this shift. Pt up ad solomon, ye care completed, x2 spec gravity labs sent down d/t large volumes of urine. Team is aware, no interventions ordered yet, possible endocrine consult, MRI tonight for follow up d/t the size of pt's tumor. Pt's wife @ bedside and supportive, continue to care per orders.       MRI checklist completed and sent down.

## 2023-09-22 NOTE — PROVIDER NOTIFICATION
RN called NSG to notify them regarding pt's I&Os, plan to hold getting a 2nd spec grav until 1400 with the urine osmolality. Neuros unchanged, pt denies salty/metallic taste.

## 2023-09-22 NOTE — PLAN OF CARE
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   Vitals: VSS on RA. Continues pulse ox.    Neuros: Neuro intact. Wears glasses, denied blurry vision.   IV:  PIV infusing NS @ 75 mL/hr  L arm  Labs/Electrolytes: WNL  Resp/trach: LSC denies SOB  Diet: Regular  Bowel status:  LBM PTA and scheduled senna given on previous shift, BS+  : Allen  in place, voiding, Strict I`s & O`s   Skin:  Intact. Bilat nostril packing-UTV.   Pain: patient reported 5/10. Managed with prn 5mg oxy and scheduled tylenol   Activity:  SBA/GB.   Social: Family not at bedside  Plan: Continue manage pain, monitor and follow POC. Strict I & O. good output,  Updates this shift: pain management done

## 2023-09-22 NOTE — PLAN OF CARE
Status: s/p of Endonasal tumor resection .   Vitals: VSS on RA. Continues pulse ox.   Neuros: Neuro intact. Double and blurry version at baseline. Wears glasses .   IV: PIV infusing NS @ 75 mL/hr .   Labs/Electrolytes: WNL.   Resp/trach: LSC. Denies SOB.   Diet: Regular and had peanut/Jam toast here.   Bowel status: LBM PTA and scheduled senna given. BS+.  : Allen in place, voiding.   Skin: Intact. Bilat nostril packing-UTV.  Pain: 2-3/10. Taken PRN oxycodone.   Activity: SBA/GB. Walked from stretcher to bed with HA and dizziness with standing.   Social: Pt's family at bedside and will be back tomorrow.   Plan: Continue manage pain, monitor and follow POC. Strict I & O.

## 2023-09-22 NOTE — PROGRESS NOTES
[facility]   09/22/2023  Neurosurgery Progress Note:    Assessment:  Yony Loera is a 48 year old male with pituitary macroadenoma s/p EEA on 9/22. His vision is improved compared to pre-op. Urine labs sent due to high urine output, sodium level remains normal.      Plan:  - Serial neuro exams  - Pain control  - HOB > 30 degrees  - Advance diet as tolerated  - Nasal precautions  - Strict I/O  - Bowel regimen  - PRN antiemetics  - DI and CSF leak watch  - PT/OT  - SCDs for DVT proph  - Follow Na levels at 2 pm  - MRI during this hospitalization   -----------------------------------  Manas Edouard MD, PGY-1  Department of Neurosurgery  Pager: [phone]    Please contact neurosurgery resident on call with questions.    Dial * * [phone], enter [phone] when prompted.   -----------------------------------  Interval History: High urine output overnight.     Objective:   Temp:  [98  F (36.7  C)-98.9  F (37.2  C)] 98.6  F (37  C)  Pulse:  [56-90] 56  Resp:  [10-20] 16  BP: (109-136)/(64-90) 123/78  MAP:  [79 mmHg-84 mmHg] 79 mmHg  Arterial Line BP: (130-138)/(56-58) 130/56  SpO2:  [95 %-100 %] 100 %  I/O last 3 completed shifts:  In: 3510 [P.O.:510; I.V.:3000]  Out: 2180 [Urine:1980; Blood:200]    Gen: Appears comfortable, NAD  Wound: clean, dry, intact  Neurologic:  - Alert & Oriented to person, place, time, and situation  - Follows commands briskly  - Speech fluent, spontaneous. No aphasia or dysarthria.  - No gaze preference. No apparent hemineglect.  - PERRL, EOMI  - Face symmetric with sensation intact to light touch  - Palate elevates symmetrically, uvula midline, tongue protrudes midline  - Trapezii muscles 5/5 bilaterally  - No pronator drift     Del Tr Bi WE WF Gr   R 5 5 5 5 5 5   L 5 5 5 5 5 5    HF KE KF DF PF EHL   R 5 5 5 5 5 5   L 5 5 5 5 5 5     Reflexes 2+ throughout    Sensation intact and symmetric to light touch throughout    LABS:  Recent Labs   Lab 09/22/23  3677  09/22/23  0528 09/21/23  1849 09/21/23  1521     --  144 139   POTASSIUM 3.4  --  3.9 4.2   CHLORIDE 109*  --  109*  --    CO2 22  --  22  --    ANIONGAP 12  --  13  --    * 149* 149* 135*   BUN 15.0  --  16.5  --    CR 1.04  --  0.98  --    SOURAV 9.1  --  8.5*  --      Recent Labs   Lab 09/21/23  1849   WBC 12.7*   RBC 3.62*   HGB 10.7*   HCT 31.9*   MCV 88   MCH 29.6   MCHC 33.5   RDW 13.5        IMAGING:  No results found for this or any previous visit (from the past 24 hour(s)).

## 2023-09-22 NOTE — PLAN OF CARE
Physical Therapy: Orders received. Chart reviewed and discussed with care team.? Physical Therapy not indicated due to pt mobilizing near/at baseline and has no PT needs per OT.? Defer discharge recommendations to OT.? Will complete orders.

## 2023-09-23 ENCOUNTER — APPOINTMENT (OUTPATIENT)
Dept: MRI IMAGING | Facility: CLINIC | Age: 48
End: 2023-09-23
Attending: STUDENT IN AN ORGANIZED HEALTH CARE EDUCATION/TRAINING PROGRAM
Payer: COMMERCIAL

## 2023-09-23 LAB
CORTIS SERPL-MCNC: 10.2 UG/DL
GLUCOSE BLDC GLUCOMTR-MCNC: 111 MG/DL (ref 70–99)
HOLD SPECIMEN: NORMAL
MAGNESIUM SERPL-MCNC: 2.2 MG/DL (ref 1.7–2.3)
OSMOLALITY UR: 348 MMOL/KG (ref 100–1200)
PHOSPHATE SERPL-MCNC: 2.7 MG/DL (ref 2.5–4.5)
SODIUM SERPL-SCNC: 143 MMOL/L (ref 136–145)
SODIUM SERPL-SCNC: 144 MMOL/L (ref 136–145)
SODIUM SERPL-SCNC: 145 MMOL/L (ref 136–145)

## 2023-09-23 PROCEDURE — 99222 1ST HOSP IP/OBS MODERATE 55: CPT | Performed by: INTERNAL MEDICINE

## 2023-09-23 PROCEDURE — A9585 GADOBUTROL INJECTION: HCPCS | Mod: JZ | Performed by: NEUROLOGICAL SURGERY

## 2023-09-23 PROCEDURE — 250N000013 HC RX MED GY IP 250 OP 250 PS 637: Performed by: STUDENT IN AN ORGANIZED HEALTH CARE EDUCATION/TRAINING PROGRAM

## 2023-09-23 PROCEDURE — 83735 ASSAY OF MAGNESIUM: CPT | Performed by: NEUROLOGICAL SURGERY

## 2023-09-23 PROCEDURE — 255N000002 HC RX 255 OP 636: Mod: JZ | Performed by: NEUROLOGICAL SURGERY

## 2023-09-23 PROCEDURE — 84295 ASSAY OF SERUM SODIUM: CPT | Performed by: NURSE PRACTITIONER

## 2023-09-23 PROCEDURE — 250N000011 HC RX IP 250 OP 636

## 2023-09-23 PROCEDURE — 70553 MRI BRAIN STEM W/O & W/DYE: CPT | Mod: 26 | Performed by: RADIOLOGY

## 2023-09-23 PROCEDURE — 84100 ASSAY OF PHOSPHORUS: CPT | Performed by: NEUROLOGICAL SURGERY

## 2023-09-23 PROCEDURE — 82533 TOTAL CORTISOL: CPT | Performed by: STUDENT IN AN ORGANIZED HEALTH CARE EDUCATION/TRAINING PROGRAM

## 2023-09-23 PROCEDURE — 84295 ASSAY OF SERUM SODIUM: CPT | Performed by: STUDENT IN AN ORGANIZED HEALTH CARE EDUCATION/TRAINING PROGRAM

## 2023-09-23 PROCEDURE — 70553 MRI BRAIN STEM W/O & W/DYE: CPT

## 2023-09-23 PROCEDURE — 999N000128 HC STATISTIC PERIPHERAL IV START W/O US GUIDANCE

## 2023-09-23 PROCEDURE — 83935 ASSAY OF URINE OSMOLALITY: CPT | Performed by: STUDENT IN AN ORGANIZED HEALTH CARE EDUCATION/TRAINING PROGRAM

## 2023-09-23 PROCEDURE — 36415 COLL VENOUS BLD VENIPUNCTURE: CPT | Performed by: STUDENT IN AN ORGANIZED HEALTH CARE EDUCATION/TRAINING PROGRAM

## 2023-09-23 PROCEDURE — 250N000011 HC RX IP 250 OP 636: Performed by: STUDENT IN AN ORGANIZED HEALTH CARE EDUCATION/TRAINING PROGRAM

## 2023-09-23 PROCEDURE — 120N000002 HC R&B MED SURG/OB UMMC

## 2023-09-23 PROCEDURE — 250N000011 HC RX IP 250 OP 636: Mod: JZ

## 2023-09-23 RX ORDER — DESMOPRESSIN ACETATE 4 UG/ML
1 INJECTION, SOLUTION INTRAVENOUS; SUBCUTANEOUS EVERY 12 HOURS
Status: DISCONTINUED | OUTPATIENT
Start: 2023-09-23 | End: 2023-09-25

## 2023-09-23 RX ORDER — LORAZEPAM 2 MG/ML
1 INJECTION INTRAMUSCULAR ONCE
Status: COMPLETED | OUTPATIENT
Start: 2023-09-23 | End: 2023-09-23

## 2023-09-23 RX ORDER — GADOBUTROL 604.72 MG/ML
0.1 INJECTION INTRAVENOUS ONCE
Status: COMPLETED | OUTPATIENT
Start: 2023-09-23 | End: 2023-09-23

## 2023-09-23 RX ADMIN — SENNOSIDES AND DOCUSATE SODIUM 2 TABLET: 50; 8.6 TABLET ORAL at 19:56

## 2023-09-23 RX ADMIN — ACETAMINOPHEN 975 MG: 325 TABLET, FILM COATED ORAL at 19:56

## 2023-09-23 RX ADMIN — LEVOTHYROXINE SODIUM 75 MCG: 75 TABLET ORAL at 08:34

## 2023-09-23 RX ADMIN — GADOBUTROL 9.6 ML: 604.72 INJECTION INTRAVENOUS at 12:10

## 2023-09-23 RX ADMIN — DESMOPRESSIN ACETATE 1 MCG: 4 SOLUTION INTRAVENOUS at 00:08

## 2023-09-23 RX ADMIN — ACETAMINOPHEN 975 MG: 325 TABLET, FILM COATED ORAL at 11:55

## 2023-09-23 RX ADMIN — ACETAMINOPHEN 975 MG: 325 TABLET, FILM COATED ORAL at 04:28

## 2023-09-23 RX ADMIN — LORAZEPAM 1 MG: 2 INJECTION INTRAMUSCULAR; INTRAVENOUS at 11:55

## 2023-09-23 RX ADMIN — CEFTRIAXONE SODIUM 1 G: 1 INJECTION, POWDER, FOR SOLUTION INTRAMUSCULAR; INTRAVENOUS at 08:33

## 2023-09-23 RX ADMIN — SENNOSIDES AND DOCUSATE SODIUM 2 TABLET: 50; 8.6 TABLET ORAL at 08:34

## 2023-09-23 ASSESSMENT — ACTIVITIES OF DAILY LIVING (ADL)
ADLS_ACUITY_SCORE: 20
ADLS_ACUITY_SCORE: 20
ADLS_ACUITY_SCORE: 22
ADLS_ACUITY_SCORE: 20
ADLS_ACUITY_SCORE: 22
ADLS_ACUITY_SCORE: 20
ADLS_ACUITY_SCORE: 20
ADLS_ACUITY_SCORE: 22
ADLS_ACUITY_SCORE: 20
ADLS_ACUITY_SCORE: 20
ADLS_ACUITY_SCORE: 22
ADLS_ACUITY_SCORE: 20

## 2023-09-23 NOTE — PLAN OF CARE
Pt POD#2 TSS, vss, neuros include: intact. PIV SL between ABX, regular diet w/good PO intake, ye removed @ 1100, loose stools, scheduled Tylenol effective for pain management. Pt denies salty/metallic taste, no significant headache, MRI completed with 1mg IV ativan. Pt ambulating hallways independently, mother-in-law @ bedside, pt having serosanguinous drainage with positional movement, pt dabbing away, no concern. Continue to care per orders.

## 2023-09-23 NOTE — PLAN OF CARE
Status: POD #2 s/p Endonasal tumor resection  Vitals: VSS on RA.   Neuros: neuro intact, wears glasses, denied blurry vision or salty/metallic taste.  IV: PIV SL  Labs/Electrolytes: Last sodium draw 147, Spec Grav-1.003  Resp/trach: WNL  Diet: Regular diet.   Bowel status: LBM 9/22 per pt  : Allen in place until POD 2. Strict I & Os. Clear o/p, large amount of urine OVN MD aware.  Skin: Intact Bilat nostril packing UTV, Small pink drainage pt uses tissues.  Pain: Denies  Activity: SBA/GB  Plan: Continue to monitor and follow POC

## 2023-09-23 NOTE — PROGRESS NOTES
"Otolaryngology Progress Note  9/23/2023    S: No acute events overnight. Having polyuria and polydipsia yesterday with 12 L of UOP yesterday consistent with diabetes insipidus. Responded well to desmopressin this morning and reports symptoms are much better. Pain well-controlled. Stable nasal congestion to be expected after surgery. Denies headache, vision changes, salty/metallic taste, anterior or posterior rhinorrhea.     O: /76 (BP Location: Right arm)   Pulse 74   Temp 97.7  F (36.5  C) (Oral)   Resp 16   Ht 1.803 m (5' 11\")   Wt 96.7 kg (213 lb 3.2 oz)   SpO2 99%   BMI 29.74 kg/m     General: Alert and oriented x 3, No acute distress   HEENT: EOMI without diplopia. Pupils reactive. Oliva splints in place. No nasal drainage.   Pulmonary: Breathing non-labored, no stridor, no accessory muscle use.    Labs:  Na 147 (147, 143)  Osm 300, Usg 1.003 (1.002), Chaya < 20, Uosm 77    BMP  Recent Labs   Lab 09/23/23  0631 09/23/23  0557 09/22/23  1931 09/22/23  1622 09/22/23  1415 09/22/23  0815 09/22/23  0528 09/21/23  1849 09/21/23  1849 09/21/23  1521     --  147*  --  147* 143  --    < > 144 139   POTASSIUM  --   --   --  4.1  --  3.4  --   --  3.9 4.2   CHLORIDE  --   --   --   --   --  109*  --   --  109*  --    SOURAV  --   --   --   --   --  9.1  --   --  8.5*  --    CO2  --   --   --   --   --  22  --   --  22  --    BUN  --   --   --   --   --  15.0  --   --  16.5  --    CR  --   --   --   --   --  1.04  --   --  0.98  --    GLC  --  111*  --   --   --  146* 149*  --  149* 135*    < > = values in this interval not displayed.     CBC  Recent Labs   Lab 09/21/23  1849 09/21/23  1521   WBC 12.7*  --    RBC 3.62*  --    HGB 10.7* 11.0*   HCT 31.9*  --    MCV 88  --    MCH 29.6  --    MCHC 33.5  --    RDW 13.5  --      --      INRNo lab results found in last 7 days.    A/P: Yony Loera is a 48 year old male with a past medical history of pituitary macroadenoma s/p EEA on 9/21 with Dr." Vic and Dr. Lacy. He is doing well post-operatively. 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    - Continue sinus precautions:  - Do not use a straw  - Do not blow your nose  - Do not strain (take stool softeners)  - Open your mouth when you sneeze  - No lifting greater than 20 lbs  - Try not to lean forward and allow blood to build up in your head.  - No nasal saline while inpatient. Plan to start at discharge  - Continue to monitor for s/s of CSF leak  - Remainder of care per NSGY  - Call ENT with questions or concerns    -- Patient and above plan discussed with Dr. Vic Mcgill MD  Otolaryngology-Head & Neck Surgery PGY-2  Please contact ENT with questions by dialing * [phone] and entering job [phone] when prompted.

## 2023-09-23 NOTE — CONSULTS
Endocrinology Consult     Yony Loera MRN:[id number] YOB: 1975  Date of Admission:9/21/2023   Primary care provider: Gurmeet Castillo     Reason for visit: Pituitary macroadenoma (H)   Reason for Endocrine consult: Post Operative central diabetes insipidus    HPI:  Yony Loera is a 48 year old male with recently discovered pituitary macroadenoma .Of note, in January 2023 patient was experiencing blurry vision for which he initially saw ophthalmologist.  During the work-up on brain MRI, the patient was found to have heterogeneously enhancing expansile mass measuring 5.6 x 4.3 x 4.3 cm with extension into the suprasellar cistern.  Marked mass effect on the optic chiasm was noted(details of the image in the media section below).  Patient was referred to neurosurgery and endocrinology for further work-up.  Patient saw Dr. Mcgrath on 25 August 2023.     Patient did not have any other complaints at that time such as postural dizziness,loss of consciousness, fatigue excessive thirst , polyuria, polydipsia    Of note, preoperative hormonal work-up also revealed mildly elevated prolactin likely stalk effect, low free T4 with a normal TSH and low serum testosterone with normal FSH likely from the mass effect of the tumor.   Patient is now s/p endoscopic endonasal resection of the pituitary macroadenoma on 21 September 2023.    Preoperatively, serum sodium was 144.  Postoperatively, patient serum sodium up trended to 147, with urine osmolarity of 77, serum osmolarity of 300, urine sodium less than 20 and urine output in 24 hours on the 22nd September of approximately 12 L.  Endocrine consulted for management of postop central DI          Relevant home meds levothyroxine 75 mcg    Relevant orders    Relevant labs  Endocrine hormonal work-up:   TSH (16 August 2023): 2.39  Free T4 (16 August 2023): 0.79  Serum cortisol 8 AM (16 August 2023): 9.7   Serum ACTH(16 August 2023) : 26  FSH(16 August 2023):  4.8  Growth hormone(16 August 2023): 0.1  Insulin growth factor(16 August 2023): 182  Nutropin(16 August 2023): 2.7  Serum testosterone(16 August 2023) 192   Serum prolactin(16 August 2023): 30            Labs/Endocrine related events on  this admission  Serum sodium(21 August 2023) 144  Serum sodium(21 August 2023) 143  Endoscopic endonasal pituitary resection(21 August 2023)  Serum sodium(22 August 2023) 147  Serum sodium(22 August 2023) 147  Urine osmolarity(22 August 2023) 77  Serum osmolarity(22 August 2023) 300  Urine specific gravity 1.003  DDAVP 1 mcg 1 dose(22 August 2023)   Serum sodium(23 August 2023) 145  Serum sodium(23 August 2023) 144  Postoperative serum cortisol 630 a.m. 10.2         Large lobulated T2 FLAIR intermediate (somewhat similar to gray matter  on T1 and T2) and heterogeneously enhancing expansile mass centered on  the nonvisualized sella measuring 5.6 x 4.3 x 4.3 cm. Areas of marked  T2 hyperintense signal may indicated cystic or necrotic component.  This superiorly extends into suprasellar cistern and widens diaphragma  sella. There is associated diffusion restriction. Nonvisualization of  the pituitary gland and stalk. Marked mass effect upon the optic  chiasm which appears elevated.  This inferiorly this extends into the nasopharynx and involves the  majority of clivus stopping at the junction of the sphenoid bone with  the anterior occipital bone.  This anteriorly extends through the  sphenoid locules and into the posterior ethmoidal cells. Posteriorly  this extends into the prepontine cistern which is partially effaced.  There is invasion of the bilateral cavernous sinuses (best seen on  image 7 of series 11). No evidence of encasement of either cavernous  segment of the bilateral internal carotid arteries        Input and Output       ROS:  All 12 systems were reviewed and negative except as mentioned in HPI          Past Medical/Surgical History:       Past Medical History:  "  Diagnosis Date    Pituitary macroadenoma (H) 08/2023     Past Surgical History:   Procedure Laterality Date    DENTAL SURGERY      wisdom             Allergies:     No Known Allergies          PTA Meds:     Prior to Admission medications    Medication Sig Last Dose Taking? Auth Provider Long Term End Date   cetirizine (ZYRTEC) 10 MG tablet Take 1 tablet by mouth daily as needed Past Month Yes Reported, Patient     levothyroxine (SYNTHROID/LEVOTHROID) 75 MCG tablet Take 1 tablet (75 mcg) by mouth daily  Patient taking differently: Take 75 mcg by mouth every morning 9/21/2023 at 0430 Yes Delilah Mcgrath MD Yes    multivitamin w/minerals (MULTI-VITAMIN) tablet Take 1 tablet by mouth every evening Past Week Yes Reported, Patient                        Family History:     Family History   Problem Relation Age of Onset    Anesthesia Reaction Father         PONV    Glaucoma No family hx of     Macular Degeneration No family hx of     Deep Vein Thrombosis (DVT) No family hx of              Social History:     Social History     Tobacco Use    Smoking status: Never    Smokeless tobacco: Never   Substance Use Topics    Alcohol use: Yes     Alcohol/week: 2.0 - 3.0 standard drinks of alcohol     Types: 2 - 3 Standard drinks or equivalent per week               Physical Exam:     /76 (BP Location: Right arm)   Pulse 74   Temp 97.7  F (36.5  C) (Oral)   Resp 16   Ht 1.803 m (5' 11\")   Wt 96.7 kg (213 lb 3.2 oz)   SpO2 99%   BMI 29.74 kg/m      General: NAD  HEENT: EOMI, nonicteric  Chest: Clear to auscultation bilaterally  Cardio: S1-S2 heard, no M/R/G  Abdomen: Soft, nontender, BS +  MSK: No pedal edema  Skin: No rash noted  Neuro: AAOx3, neuro exam grossly nonfocal  Psych: Calm        Labs:       Component      Latest Ref Rng 9/22/2023  8:15 AM 9/22/2023  8:28 AM 9/22/2023  2:12 PM   Osmolality      275 - 295 mmol/kg 300 (H)      Urine Osmolality      100 - 1,200 mmol/kg  77 (L)     Sodium Urine mmol/L      mmol/L "  <20     Specific Gravity Urine      1.003 - 1.035    1.003            Prior Labs :    Latest Ref Rng 8/16/2023  7:51 AM   ENDO PITUITARY LABS[facility]     Adrenal Corticotropin <47 pg/mL 26    Cortisol Serum ug/dL 9.7    FSH 1.5 - 12.4 mIU/mL 4.8    Glucose 70 - 99 mg/dL 106 (H)    GLUCOSE BY METER POCT 70 - 99 mg/dL    Growth Hormone <1.3 ug/L 0.1    Ins Growth Factor 1 67 - 225 ng/mL 182    Lutropin 1.7 - 8.6 mIU/mL 2.7    Osmolality 275 - 295 mmol/kg    Urine Osmolality 100 - 1,200 mmol/kg    Potassium 3.4 - 5.3 mmol/L 4.2    Sodium 136 - 145 mmol/L 138    Sodium Urine mmol/L mmol/L    Testosterone Total 240 - 950 ng/dL 192 (L)    TSH 0.30 - 4.20 uIU/mL 2.39    T4 Free 0.90 - 1.70 ng/dL 0.79 (L)               Assessment and Plan:   Yony Loera is a 48 year old male with recently diagnosed pituitary macroadenoma measuring 5.6 x4.3 x 4.3 cm with mass effect on the optic chiasm admitted for endoscopic endonasal resection of the pituitary macroadenoma.    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    Preoperative serum sodium was 144, started uptrending on the 22nd up to 147.  Urine osmolarity 77, documented urine output of 12 L within input of approximately 6.5 L   Received 1 dose of 1 mcg desmopressin subcutaneous on 22nd September evening.  Patient has clinically improved since a dose of desmopressin, has evidenced by improving serum sodium  and decreased urine output.   Patient denies any excessive thirst, icewater cravings or any other symptoms and is overall doing well.  No neurological deficits on exam.      Considering, the size of the pituitary adenoma with postoperative central diabetes insipidus is expected.  Cannot say for sure if this will be just transient or permanent at the given time.    Plan :   Every 6 hourly's sodium  checks  Please obtain urine osmolarity around 6 PM and then next 1 at 6 AM in the morning  Strict urine input and output.  We will continue with DDAVP 1 mcg every 12 hours for now.             2.  Hypothyroidism  Preoperative TSH was 2.39 and T4 free 0.79.  Likely central hypothyroidism due to mass effect of the pituitary  adenoma    Can continue with current dose of levothyroxine 75 mcg.      3. Mild Prolactinemia :   Likely due to stalk effect; s/p pituitary adenoma resection.    4. Hypogonadism likely secondary  Will require outpatient endocrinology follow-up with repeat total testosterone levels to guide further management       Patient labs, chart, and imaging reviewed.   Discussed with oncall attending.     Prosper Hernández MD  Endocrine Fellow  [phone]     Attending tie-in note  I saw the patient with endocrine fellow Dr. Hernández and directly examined patient and discussed. Agree above note and plan.       Delliah Mcgrath MD  Staff Physician  Endocrinology and Metabolism  [facility]  License: [id number]  Pager: [phone]

## 2023-09-23 NOTE — PLAN OF CARE
Cared for pt [id number]:    Status:  POD #1 s/p of Endonasal tumor resection .   Vitals: VSS on RA. Continues pulse ox.    Neuros: Neuro intact. Wears glasses, denied blurry vision or salty/metallic taste.   IV:  PIV SL  Labs/Electrolytes: WNL  Resp/trach: LSC denies SOB  Diet: Regular. Pt instructed to drink enough to quench thirst.See flowsheet.   Bowel status:  LBM PTA and scheduled senna given on previous shift, BS+  : Allen in place until POD 2, Strict I & O`s. Clear o/p, spec gravity sent- MD aware.  Skin:  Intact. Bilat nostril packing-UTV. Small pink drainage, pt dabs with tissue.  Pain: denied  Activity:  SBA/GB. Walking unit with family.  Social: Family at bedside.   Plan: Continue manage pain, monitor and follow POC. Strict I & O. good output  Update: 1830 Na+ lab pending, 1800 Spec Grav result 1.002

## 2023-09-24 LAB
CORTIS SERPL-MCNC: 2 UG/DL
MAGNESIUM SERPL-MCNC: 2.4 MG/DL (ref 1.7–2.3)
OSMOLALITY SERPL: 296 MMOL/KG (ref 275–295)
OSMOLALITY UR: 243 MMOL/KG (ref 100–1200)
PHOSPHATE SERPL-MCNC: 3.5 MG/DL (ref 2.5–4.5)
POTASSIUM SERPL-SCNC: 3.7 MMOL/L (ref 3.4–5.3)
SODIUM SERPL-SCNC: 141 MMOL/L (ref 136–145)
SODIUM SERPL-SCNC: 143 MMOL/L (ref 136–145)
SODIUM SERPL-SCNC: 143 MMOL/L (ref 136–145)
SODIUM SERPL-SCNC: 145 MMOL/L (ref 136–145)
SODIUM SERPL-SCNC: 145 MMOL/L (ref 136–145)
SODIUM UR-SCNC: 86 MMOL/L
SP GR UR STRIP: 1 (ref 1–1.03)

## 2023-09-24 PROCEDURE — 84295 ASSAY OF SERUM SODIUM: CPT | Performed by: STUDENT IN AN ORGANIZED HEALTH CARE EDUCATION/TRAINING PROGRAM

## 2023-09-24 PROCEDURE — 250N000013 HC RX MED GY IP 250 OP 250 PS 637: Performed by: STUDENT IN AN ORGANIZED HEALTH CARE EDUCATION/TRAINING PROGRAM

## 2023-09-24 PROCEDURE — 83935 ASSAY OF URINE OSMOLALITY: CPT

## 2023-09-24 PROCEDURE — 250N000011 HC RX IP 250 OP 636: Mod: JZ

## 2023-09-24 PROCEDURE — 36415 COLL VENOUS BLD VENIPUNCTURE: CPT | Performed by: STUDENT IN AN ORGANIZED HEALTH CARE EDUCATION/TRAINING PROGRAM

## 2023-09-24 PROCEDURE — 83930 ASSAY OF BLOOD OSMOLALITY: CPT

## 2023-09-24 PROCEDURE — 82533 TOTAL CORTISOL: CPT | Performed by: STUDENT IN AN ORGANIZED HEALTH CARE EDUCATION/TRAINING PROGRAM

## 2023-09-24 PROCEDURE — 120N000002 HC R&B MED SURG/OB UMMC

## 2023-09-24 PROCEDURE — 999N000127 HC STATISTIC PERIPHERAL IV START W US GUIDANCE

## 2023-09-24 PROCEDURE — 36415 COLL VENOUS BLD VENIPUNCTURE: CPT

## 2023-09-24 PROCEDURE — 84295 ASSAY OF SERUM SODIUM: CPT

## 2023-09-24 PROCEDURE — 81003 URINALYSIS AUTO W/O SCOPE: CPT | Performed by: STUDENT IN AN ORGANIZED HEALTH CARE EDUCATION/TRAINING PROGRAM

## 2023-09-24 PROCEDURE — 99232 SBSQ HOSP IP/OBS MODERATE 35: CPT | Performed by: INTERNAL MEDICINE

## 2023-09-24 PROCEDURE — 83735 ASSAY OF MAGNESIUM: CPT | Performed by: NEUROLOGICAL SURGERY

## 2023-09-24 PROCEDURE — 83935 ASSAY OF URINE OSMOLALITY: CPT | Performed by: INTERNAL MEDICINE

## 2023-09-24 PROCEDURE — 84132 ASSAY OF SERUM POTASSIUM: CPT | Performed by: NEUROLOGICAL SURGERY

## 2023-09-24 PROCEDURE — 84100 ASSAY OF PHOSPHORUS: CPT | Performed by: NEUROLOGICAL SURGERY

## 2023-09-24 PROCEDURE — 84300 ASSAY OF URINE SODIUM: CPT

## 2023-09-24 RX ORDER — HYDROCORTISONE 5 MG/1
5 TABLET ORAL EVERY 24 HOURS
Status: DISCONTINUED | OUTPATIENT
Start: 2023-09-24 | End: 2023-09-26 | Stop reason: HOSPADM

## 2023-09-24 RX ADMIN — SENNOSIDES AND DOCUSATE SODIUM 2 TABLET: 50; 8.6 TABLET ORAL at 09:01

## 2023-09-24 RX ADMIN — ACETAMINOPHEN 650 MG: 325 TABLET, FILM COATED ORAL at 22:42

## 2023-09-24 RX ADMIN — LEVOTHYROXINE SODIUM 75 MCG: 75 TABLET ORAL at 09:02

## 2023-09-24 RX ADMIN — CEFTRIAXONE SODIUM 1 G: 1 INJECTION, POWDER, FOR SOLUTION INTRAMUSCULAR; INTRAVENOUS at 09:01

## 2023-09-24 RX ADMIN — SENNOSIDES AND DOCUSATE SODIUM 2 TABLET: 50; 8.6 TABLET ORAL at 20:00

## 2023-09-24 RX ADMIN — HYDROCORTISONE 15 MG: 10 TABLET ORAL at 11:22

## 2023-09-24 RX ADMIN — ACETAMINOPHEN 975 MG: 325 TABLET, FILM COATED ORAL at 11:22

## 2023-09-24 RX ADMIN — ACETAMINOPHEN 975 MG: 325 TABLET, FILM COATED ORAL at 04:20

## 2023-09-24 RX ADMIN — HYDROCORTISONE 5 MG: 5 TABLET ORAL at 14:53

## 2023-09-24 ASSESSMENT — ACTIVITIES OF DAILY LIVING (ADL)
ADLS_ACUITY_SCORE: 20
ADLS_ACUITY_SCORE: 22
ADLS_ACUITY_SCORE: 20
ADLS_ACUITY_SCORE: 20
ADLS_ACUITY_SCORE: 22
ADLS_ACUITY_SCORE: 20

## 2023-09-24 NOTE — PROGRESS NOTES
[facility], [place]   09/24/2023  Neurosurgery Progress Note:    Assessment:  Yony Loera is a 48 year old male with pituitary macroadenoma s/p EEA on 9/22. His vision is improved compared to pre-op. Urine labs sent due to high urine output, sodium level remains normal.      Plan:  - Na q6h  - Hydrocort 15 and 5 (cortisol 2 this AM)  - Hold DDAVP per endocrinology  - Head CT 9/25 AM   - Serial neuro exams  - Pain control  - HOB > 30 degrees  - Advance diet as tolerated  - Nasal precautions  - Strict I/O  - Bowel regimen  - PRN antiemetics  - DI and CSF leak watch  - PT/OT  - SCDs for DVT proph  -----------------------------------  Manas Edouard MD, PGY-1  Department of Neurosurgery  Pager: [phone]    Please contact neurosurgery resident on call with questions.    Dial * * *[phone], enter [phone] when prompted.   -----------------------------------  Interval History: S/p 1x DDAVP. Last sodium checks have been normal, urine output reduced. No salty/metallic taste, only mucous like discharge from nose this morning    Objective:   Temp:  [97.7  F (36.5  C)-98.2  F (36.8  C)] 97.8  F (36.6  C)  Pulse:  [60-65] 60  Resp:  [16-18] 16  BP: (115-135)/(81-84) 115/84  SpO2:  [98 %] 98 %  I/O last 3 completed shifts:  In: 1250 [P.O.:1250]  Out: 2800 [Urine:2800]    Gen: Appears comfortable, NAD  Wound: clean, dry, intact  Neurologic:  - Alert & Oriented to person, place, time, and situation  - Follows commands briskly  - Speech fluent, spontaneous. No aphasia or dysarthria.  - No gaze preference. No apparent hemineglect.  - PERRL, EOMI  - Face symmetric with sensation intact to light touch  - Palate elevates symmetrically, uvula midline, tongue protrudes midline  - Trapezii muscles 5/5 bilaterally  - No pronator drift     Del Tr Bi WE WF Gr   R 5 5 5 5 5 5   L 5 5 5 5 5 5    HF KE KF DF PF EHL   R 5 5 5 5 5 5   L 5 5 5 5 5 5     Reflexes 2+ throughout    Sensation intact and symmetric to light  touch throughout    LABS:  Recent Labs   Lab 09/24/23  1148 09/24/23  0554 09/23/23  2342 09/23/23  0631 09/23/23  0557 09/22/23  1931 09/22/23  1622 09/22/23  1415 09/22/23  0815 09/22/23  0528 09/21/23  1849    143 141   < >  --    < >  --    < > 143  --  144   POTASSIUM  --  3.7  --   --   --   --  4.1  --  3.4  --  3.9   CHLORIDE  --   --   --   --   --   --   --   --  109*  --  109*   CO2  --   --   --   --   --   --   --   --  22  --  22   ANIONGAP  --   --   --   --   --   --   --   --  12  --  13   GLC  --   --   --   --  111*  --   --   --  146* 149* 149*   BUN  --   --   --   --   --   --   --   --  15.0  --  16.5   CR  --   --   --   --   --   --   --   --  1.04  --  0.98   SOURAV  --   --   --   --   --   --   --   --  9.1  --  8.5*    < > = values in this interval not displayed.       Recent Labs   Lab 09/21/23  1849   WBC 12.7*   RBC 3.62*   HGB 10.7*   HCT 31.9*   MCV 88   MCH 29.6   MCHC 33.5   RDW 13.5          IMAGING:  No results found for this or any previous visit (from the past 24 hour(s)).

## 2023-09-24 NOTE — PROGRESS NOTES
Endocrinology Consult     Yony Loera MRN:[id number] YOB: 1975  Date of Admission:9/21/2023   Primary care provider: Gurmeet Castillo     Reason for visit: Pituitary macroadenoma (H)   Reason for Endocrine consult: Post Operative central diabetes insipidus    HPI:  Yony Loera is a 48 year old male with recently discovered pituitary macroadenoma .Of note, in January 2023 patient was experiencing blurry vision for which he initially saw ophthalmologist.  During the work-up on brain MRI, the patient was found to have heterogeneously enhancing expansile mass measuring 5.6 x 4.3 x 4.3 cm with extension into the suprasellar cistern.  Marked mass effect on the optic chiasm was noted(details of the image in the media section below).  Patient was referred to neurosurgery and endocrinology for further work-up.  Patient saw Dr. Mcgrath on 25 August 2023.     Patient did not have any other complaints at that time such as postural dizziness,loss of consciousness, fatigue excessive thirst , polyuria, polydipsia    Of note, preoperative hormonal work-up also revealed mildly elevated prolactin likely stalk effect, low free T4 with a normal TSH and low serum testosterone with normal FSH likely from the mass effect of the tumor.   Patient is now s/p endoscopic endonasal resection of the pituitary macroadenoma on 21 September 2023.    Preoperatively, serum sodium was 144.  Postoperatively, patient serum sodium up trended to 147, with urine osmolarity of 77, serum osmolarity of 300, urine sodium less than 20 and urine output in 24 hours on the 22nd September of approximately 12 L.  Endocrine consulted for management of postop central DI          Relevant home meds levothyroxine 75 mcg    Relevant orders    Relevant labs  Endocrine hormonal work-up:   TSH (16 August 2023): 2.39  Free T4 (16 August 2023): 0.79  Serum cortisol 8 AM (16 August 2023): 9.7   Serum ACTH(16 August 2023) : 26  FSH(16 August 2023):  4.8  Growth hormone(16 August 2023): 0.1  Insulin growth factor(16 August 2023): 182  Nutropin(16 August 2023): 2.7  Serum testosterone(16 August 2023) 192   Serum prolactin(16 August 2023): 30            Labs/Endocrine related events on  this admission  Serum sodium(21 August 2023) 144  Serum sodium(21 August 2023) 143  Endoscopic endonasal pituitary resection(21 August 2023)  Serum sodium(22 August 2023) 147  Serum sodium(22 August 2023) 147  Urine osmolarity(22 August 2023) 77  Serum osmolarity(22 August 2023) 300  Urine specific gravity 1.003  DDAVP 1 mcg 1 dose(22 August 2023)   Serum sodium(23 August 2023) 145  Serum sodium(23 August 2023) 144  Postoperative serum cortisol 630 a.m. 10.2  Serum sodium(23 August 2023) 143  Serum sodium (23 August 2023) 141  Urine osmolarity(23 August 2023) 348  Serum cortisol( 24 August 2023) 2.0  Serum sodiuml( 24 August 2023) 143           Large lobulated T2 FLAIR intermediate (somewhat similar to gray matter  on T1 and T2) and heterogeneously enhancing expansile mass centered on  the nonvisualized sella measuring 5.6 x 4.3 x 4.3 cm. Areas of marked  T2 hyperintense signal may indicated cystic or necrotic component.  This superiorly extends into suprasellar cistern and widens diaphragma  sella. There is associated diffusion restriction. Nonvisualization of  the pituitary gland and stalk. Marked mass effect upon the optic  chiasm which appears elevated.  This inferiorly this extends into the nasopharynx and involves the  majority of clivus stopping at the junction of the sphenoid bone with  the anterior occipital bone.  This anteriorly extends through the  sphenoid locules and into the posterior ethmoidal cells. Posteriorly  this extends into the prepontine cistern which is partially effaced.  There is invasion of the bilateral cavernous sinuses (best seen on  image 7 of series 11). No evidence of encasement of either cavernous  segment of the bilateral internal carotid  "arteries        Input and Output       ROS:  All 12 systems were reviewed and negative except as mentioned in HPI          Past Medical/Surgical History:       Past Medical History:   Diagnosis Date    Pituitary macroadenoma (H) 08/2023     Past Surgical History:   Procedure Laterality Date    DENTAL SURGERY      wisdom             Allergies:     No Known Allergies          PTA Meds:     Prior to Admission medications    Medication Sig Last Dose Taking? Auth Provider Long Term End Date   cetirizine (ZYRTEC) 10 MG tablet Take 1 tablet by mouth daily as needed Past Month Yes Reported, Patient     levothyroxine (SYNTHROID/LEVOTHROID) 75 MCG tablet Take 1 tablet (75 mcg) by mouth daily  Patient taking differently: Take 75 mcg by mouth every morning 9/21/2023 at 0430 Yes Delilah Mcgrath MD Yes    multivitamin w/minerals (MULTI-VITAMIN) tablet Take 1 tablet by mouth every evening Past Week Yes Reported, Patient                        Family History:     Family History   Problem Relation Age of Onset    Anesthesia Reaction Father         PONV    Glaucoma No family hx of     Macular Degeneration No family hx of     Deep Vein Thrombosis (DVT) No family hx of              Social History:     Social History     Tobacco Use    Smoking status: Never    Smokeless tobacco: Never   Substance Use Topics    Alcohol use: Yes     Alcohol/week: 2.0 - 3.0 standard drinks of alcohol     Types: 2 - 3 Standard drinks or equivalent per week               Physical Exam:     /84 (BP Location: Right arm)   Pulse 60   Temp 97.8  F (36.6  C) (Oral)   Resp 16   Ht 1.803 m (5' 11\")   Wt 96.7 kg (213 lb 3.2 oz)   SpO2 98%   BMI 29.74 kg/m      General: NAD  HEENT: EOMI, nonicteric  Chest: Clear to auscultation bilaterally  Cardio: S1-S2 heard, no M/R/G  Abdomen: Soft, nontender, BS +  MSK: No pedal edema  Skin: No rash noted  Neuro: AAOx3, neuro exam grossly nonfocal  Psych: Calm        Labs:                     Component      Latest Ref " Rng 9/22/2023  8:15 AM 9/22/2023  8:28 AM 9/22/2023  2:12 PM   Osmolality      275 - 295 mmol/kg 300 (H)      Urine Osmolality      100 - 1,200 mmol/kg  77 (L)     Sodium Urine mmol/L      mmol/L  <20     Specific Gravity Urine      1.003 - 1.035    1.003            Prior Labs :    Latest Ref Rng 8/16/2023  7:51 AM   ENDO PITUITARY LABS[facility]     Adrenal Corticotropin <47 pg/mL 26    Cortisol Serum ug/dL 9.7    FSH 1.5 - 12.4 mIU/mL 4.8    Glucose 70 - 99 mg/dL 106 (H)    GLUCOSE BY METER POCT 70 - 99 mg/dL    Growth Hormone <1.3 ug/L 0.1    Ins Growth Factor 1 67 - 225 ng/mL 182    Lutropin 1.7 - 8.6 mIU/mL 2.7    Osmolality 275 - 295 mmol/kg    Urine Osmolality 100 - 1,200 mmol/kg    Potassium 3.4 - 5.3 mmol/L 4.2    Sodium 136 - 145 mmol/L 138    Sodium Urine mmol/L mmol/L    Testosterone Total 240 - 950 ng/dL 192 (L)    TSH 0.30 - 4.20 uIU/mL 2.39    T4 Free 0.90 - 1.70 ng/dL 0.79 (L)               Assessment and Plan:   Yony Loera is a 48 year old male with recently diagnosed pituitary macroadenoma measuring 5.6 x4.3 x 4.3 cm with mass effect on the optic chiasm admitted for endoscopic endonasal resection of the pituitary macroadenoma.    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    Preoperative serum sodium was 144, started uptrending on the 22nd up to 147.  Urine osmolarity 77, documented urine output of 12 L within input of approximately 6.5 L   Received 1 dose of 1 mcg desmopressin subcutaneous on 22nd September evening.      Serum sodium has been stable post DDAVP; Urine output in last 24 hours with output 1870 ml; Urine osm from 23 rd evening went up from 77 to 348;      Considering, the size of the pituitary adenoma with postoperative central diabetes insipidus is expected.  Cannot say for sure if this will be just transient  or permanent at the given time.    Plan :   Hold off on further dose of DDAVP;   Every 6 hourly's sodium  And urine specific gravity checks  Please obtain urine osmolarity around 6 PM and then next 1 at 6 AM in the morning  Strict urine input and output.    Note:  if pt's urine output is 300cc/h for 2 hours consecutively OR 500cc/h in 1 hour, please check the urine specific gravity and sodium level, urine sodium, urine osm. Consider giving DDAVP 1 mcg subQ If pt meets ALL these criterias:   -Urine specific gravity <1.005   -Na > 145      2. Secondary Adrenal Insufficiency   Serum am cortisol  on 24th September 2023: 2   Serum am cortisol on 23 September 2023: 10.2     Patient is hemodyanamically stable and stable electrolytes ;   BSA is 2.2 m2 ;     Plan :   Will start him on Hydrocortisone 15 am and 5 noon .     3.  Hypothyroidism  Preoperative TSH was 2.39 and T4 free 0.79.  Likely central hypothyroidism due to mass effect of the pituitary  adenoma    Can continue with current dose of levothyroxine 75 mcg.      4. Mild Prolactinemia :   Likely due to stalk effect; s/p pituitary adenoma resection.    5. Hypogonadism likely secondary  Will require outpatient endocrinology follow-up with repeat total testosterone levels to guide further management       Patient labs, chart, and imaging reviewed.   Discussed with oncall attending.     Prosper Hernández MD  Endocrine Fellow  [phone]     Attending tie-in note  I saw the patient with endocrine fellow Dr. Hernández and directly examined patient and discussed. Agree above note and plan.       Delilah Mcgrath MD  Staff Physician  Endocrinology and Metabolism  [facility]  License: [id number]  Pager: [phone]

## 2023-09-24 NOTE — PLAN OF CARE
Status: POD#3 for TSS  Vitals: VSS  Neuros: A/Ox4, intact  IV: PIV SL   Resp/trach: RA, serosang drainage from nose   Diet: regular   Bowel status: LBM 9/23  : voiding   Skin: no new deficits   Pain: taking scheduled tylenol   Activity: up ad solomon  Plan: Continue to monitor

## 2023-09-24 NOTE — PROGRESS NOTES
"Otolaryngology Progress Note  9/24/2023    S: No acute events overnight. Afebrile, HDS. Prior polyuria has improved with decreased UOP in last 24hr (1.1L) . Na 141 and Uosm 348. Denies nasal or postnasal drainage. Denies salty or metallic taste. Denies vision changes. Overall feels well. Headache well controlled with pain meds. Ambulating. Good PO intake on regular diet. MRI 9/23 with expected postsurgical changes and no residual disease.     O: /84   Pulse 60   Temp 98.2  F (36.8  C)   Resp 18   Ht 1.803 m (5' 11\")   Wt 96.7 kg (213 lb 3.2 oz)   SpO2 97%   BMI 29.74 kg/m     General: Alert and oriented x 3, No acute distress   HEENT: EOMI without diplopia. Pupils reactive. Oliva splints in place. No nasal drainage.   Pulmonary: Breathing non-labored, no stridor, no accessory muscle use.    Labs:  Na 141 (144. 145)  Osm 300, Usg 1.003 (1.003), Chaya < 20, Uosm 348 (77)    BMP  Recent Labs   Lab 09/24/23  0554 09/23/23  2342 09/23/23  1825 09/23/23  1154 09/23/23  0631 09/23/23  0557 09/22/23  1931 09/22/23  1622 09/22/23  1415 09/22/23  0815 09/22/23  0528 09/21/23  1849    141 143 144   < >  --    < >  --    < > 143  --  144   POTASSIUM 3.7  --   --   --   --   --   --  4.1  --  3.4  --  3.9   CHLORIDE  --   --   --   --   --   --   --   --   --  109*  --  109*   SOURAV  --   --   --   --   --   --   --   --   --  9.1  --  8.5*   CO2  --   --   --   --   --   --   --   --   --  22  --  22   BUN  --   --   --   --   --   --   --   --   --  15.0  --  16.5   CR  --   --   --   --   --   --   --   --   --  1.04  --  0.98   GLC  --   --   --   --   --  111*  --   --   --  146* 149* 149*    < > = values in this interval not displayed.       CBC  Recent Labs   Lab 09/21/23  1849 09/21/23  1521   WBC 12.7*  --    RBC 3.62*  --    HGB 10.7* 11.0*   HCT 31.9*  --    MCV 88  --    MCH 29.6  --    MCHC 33.5  --    RDW 13.5  --      --        INRNo lab results found in last 7 days.    A/P: Yony Loera " is a 48 year old male with a past medical history of pituitary macroadenoma s/p EEA on 9/21 with Dr. Ho and Dr. Lacy. He is doing well post-operatively. 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 Postoperative DI improving.    - Continue sinus precautions:  - Do not use a straw  - Do not blow your nose  - Do not strain (take stool softeners)  - Open your mouth when you sneeze  - No lifting greater than 20 lbs  - Try not to lean forward and allow blood to build up in your head.  - No nasal saline while inpatient. Plan to start at discharge  - Continue to monitor for s/s of CSF leak  - Remainder of care per NSGY  - Call ENT with questions or concerns    -- Patient and above plan discussed with Dr. Vic Atkins MD   Department of Otolaryngology - Head and Neck Surgery, PGY 1  Please page ENT with questions

## 2023-09-24 NOTE — PLAN OF CARE
Status: POD #2 TSS   Vitals: VSS on RA  Neuros: Intact. Pt denies any headache or metallic/salty taste.   IV: PIV SL  Labs/Electrolytes: Last sodium 143, urine osmolality 348  Resp/trach: LSC   Diet: Regular   Bowel status: LBM 9/23  : Voiding spontaneously   Skin: WNL, bilateral nasal packing UTV.   Pain: Denies   Activity: Independent in room and when ambulating in hallway   Social: family at bedside   Plan/Updates this shift: Pt having consistent small amounts of serosanguinous drainage, pt dabbing away, no concern. UA for osmolality completed and another is scheduled for 0600 tomorrow morning. Continue to monitor and follow POC.

## 2023-09-24 NOTE — PROGRESS NOTES
[facility], [place]   09/23/2023  Neurosurgery Progress Note:    Assessment:  Yony Loera is a 48 year old male with pituitary macroadenoma s/p EEA on 9/22. His vision is improved compared to pre-op. Urine labs sent due to high urine output, sodium level remains normal.      Plan:  - Cortisol today AM  - MRI today  - Will follow endocrinology recommendations  - Na q6h  - Serial neuro exams  - Pain control  - HOB > 30 degrees  - Advance diet as tolerated  - Nasal precautions  - Strict I/O  - Bowel regimen  - PRN antiemetics  - DI and CSF leak watch  - PT/OT  - SCDs for DVT proph  -----------------------------------  Aubrey Davenport  Neurosurgery Resident PGY3    Please contact neurosurgery resident on call with questions.    Dial * * [phone], enter [phone] when prompted.   -----------------------------------  Interval History: High urine output overnight. S/p 1x DDAVP.    Objective:   Temp:  [97.7  F (36.5  C)-98.2  F (36.8  C)] 97.8  F (36.6  C)  Pulse:  [60-77] 60  Resp:  [16-18] 16  BP: (115-135)/(81-84) 115/84  SpO2:  [95 %-98 %] 98 %  I/O last 3 completed shifts:  In: 2665 [P.O.:2665]  Out: 2475 [Urine:2475]    Gen: Appears comfortable, NAD  Wound: clean, dry, intact  Neurologic:  - Alert & Oriented to person, place, time, and situation  - Follows commands briskly  - Speech fluent, spontaneous. No aphasia or dysarthria.  - No gaze preference. No apparent hemineglect.  - PERRL, EOMI  - Face symmetric with sensation intact to light touch  - Palate elevates symmetrically, uvula midline, tongue protrudes midline  - Trapezii muscles 5/5 bilaterally  - No pronator drift     Del Tr Bi WE WF Gr   R 5 5 5 5 5 5   L 5 5 5 5 5 5    HF KE KF DF PF EHL   R 5 5 5 5 5 5   L 5 5 5 5 5 5     Reflexes 2+ throughout    Sensation intact and symmetric to light touch throughout    LABS:  Recent Labs   Lab 09/24/23  0554 09/23/23  2342 09/23/23  1825 09/23/23  0631 09/23/23  0557 09/22/23  1931 09/22/23  1622  09/22/23  1415 09/22/23  0815 09/22/23  0528 09/21/23  1849    141 143   < >  --    < >  --    < > 143  --  144   POTASSIUM 3.7  --   --   --   --   --  4.1  --  3.4  --  3.9   CHLORIDE  --   --   --   --   --   --   --   --  109*  --  109*   CO2  --   --   --   --   --   --   --   --  22  --  22   ANIONGAP  --   --   --   --   --   --   --   --  12  --  13   GLC  --   --   --   --  111*  --   --   --  146* 149* 149*   BUN  --   --   --   --   --   --   --   --  15.0  --  16.5   CR  --   --   --   --   --   --   --   --  1.04  --  0.98   SOURAV  --   --   --   --   --   --   --   --  9.1  --  8.5*    < > = values in this interval not displayed.     Recent Labs   Lab 09/21/23  1849   WBC 12.7*   RBC 3.62*   HGB 10.7*   HCT 31.9*   MCV 88   MCH 29.6   MCHC 33.5   RDW 13.5        IMAGING:  Recent Results (from the past 24 hour(s))   MR Brain w/o & w Contrast    Narrative    EXAM: MR BRAIN W/O & W CONTRAST  9/23/2023 1:00 PM     HISTORY:  s/p pituitary tumor resection       COMPARISON:  8/7/2023, CT head 9/21/2023.    TECHNIQUE: Axial diffusion and FLAIR images of the whole brain  obtained without intravenous contrast. Sagittal T1 and T2-weighted,  coronal T2-weighted, coronal T1-weighted images with focus on the  sella were obtained without intravenous contrast. Post intravenous  contrast using gadolinium coronal and sagittal T1-weighted images were  obtained focused on the sella. Dynamic postcontrast coronal  T1-weighted images were also obtained.    CONTRAST: 9.7 mL gadolinium best.    FINDINGS:  Surgical changes of pituitary mass resection. Small amount of residual  enhancing soft tissue is noted along the inferior margin of the  previously demonstrated mass.    There is no mass effect, midline shift, or intracranial hemorrhage.  The ventricles are proportionate to the cerebral sulci. Diffusion and  susceptibility weighted images are negative for acute/focal  abnormality. Major intracranial vascular  structures are within normal  limits. Left temporal lobe developmental venous anomaly.    No suspicious abnormality of the skull marrow signal. Clear paranasal  sinuses. Mastoid air cells are clear. The orbits are normal.      Impression    IMPRESSION:  1. Post surgical changes of a pituitary mass resection with no  definite residual tumor. Attention on follow-up there.  2. No acute intracranial findings.    I have personally reviewed the examination and initial interpretation  and I agree with the findings.    EMANI PETERS MD         SYSTEM ID:  [id number]

## 2023-09-25 ENCOUNTER — APPOINTMENT (OUTPATIENT)
Dept: CT IMAGING | Facility: CLINIC | Age: 48
End: 2023-09-25
Payer: COMMERCIAL

## 2023-09-25 LAB
HOLD SPECIMEN: NORMAL
MAGNESIUM SERPL-MCNC: 2 MG/DL (ref 1.7–2.3)
OSMOLALITY UR: 233 MMOL/KG (ref 100–1200)
OSMOLALITY UR: 436 MMOL/KG (ref 100–1200)
PHOSPHATE SERPL-MCNC: 4.7 MG/DL (ref 2.5–4.5)
POTASSIUM SERPL-SCNC: 3.5 MMOL/L (ref 3.4–5.3)
SODIUM SERPL-SCNC: 141 MMOL/L (ref 136–145)
SODIUM SERPL-SCNC: 142 MMOL/L (ref 136–145)
SODIUM SERPL-SCNC: 142 MMOL/L (ref 136–145)
SODIUM SERPL-SCNC: 143 MMOL/L (ref 136–145)

## 2023-09-25 PROCEDURE — 70450 CT HEAD/BRAIN W/O DYE: CPT

## 2023-09-25 PROCEDURE — 83735 ASSAY OF MAGNESIUM: CPT | Performed by: NEUROLOGICAL SURGERY

## 2023-09-25 PROCEDURE — 70450 CT HEAD/BRAIN W/O DYE: CPT | Mod: 26 | Performed by: RADIOLOGY

## 2023-09-25 PROCEDURE — 83935 ASSAY OF URINE OSMOLALITY: CPT | Performed by: INTERNAL MEDICINE

## 2023-09-25 PROCEDURE — 250N000013 HC RX MED GY IP 250 OP 250 PS 637: Performed by: STUDENT IN AN ORGANIZED HEALTH CARE EDUCATION/TRAINING PROGRAM

## 2023-09-25 PROCEDURE — 250N000013 HC RX MED GY IP 250 OP 250 PS 637

## 2023-09-25 PROCEDURE — 120N000002 HC R&B MED SURG/OB UMMC

## 2023-09-25 PROCEDURE — 36415 COLL VENOUS BLD VENIPUNCTURE: CPT | Performed by: STUDENT IN AN ORGANIZED HEALTH CARE EDUCATION/TRAINING PROGRAM

## 2023-09-25 PROCEDURE — 36415 COLL VENOUS BLD VENIPUNCTURE: CPT | Performed by: NEUROLOGICAL SURGERY

## 2023-09-25 PROCEDURE — 84295 ASSAY OF SERUM SODIUM: CPT | Performed by: STUDENT IN AN ORGANIZED HEALTH CARE EDUCATION/TRAINING PROGRAM

## 2023-09-25 PROCEDURE — 86334 IMMUNOFIX E-PHORESIS SERUM: CPT | Mod: 26 | Performed by: STUDENT IN AN ORGANIZED HEALTH CARE EDUCATION/TRAINING PROGRAM

## 2023-09-25 PROCEDURE — 84132 ASSAY OF SERUM POTASSIUM: CPT | Performed by: NEUROLOGICAL SURGERY

## 2023-09-25 PROCEDURE — 99233 SBSQ HOSP IP/OBS HIGH 50: CPT | Mod: GC | Performed by: INTERNAL MEDICINE

## 2023-09-25 PROCEDURE — 84100 ASSAY OF PHOSPHORUS: CPT | Performed by: NEUROLOGICAL SURGERY

## 2023-09-25 PROCEDURE — 86334 IMMUNOFIX E-PHORESIS SERUM: CPT

## 2023-09-25 RX ADMIN — LEVOTHYROXINE SODIUM 75 MCG: 75 TABLET ORAL at 07:54

## 2023-09-25 RX ADMIN — ACETAMINOPHEN 650 MG: 325 TABLET, FILM COATED ORAL at 07:59

## 2023-09-25 RX ADMIN — SENNOSIDES AND DOCUSATE SODIUM 2 TABLET: 50; 8.6 TABLET ORAL at 07:54

## 2023-09-25 RX ADMIN — ACETAMINOPHEN 650 MG: 325 TABLET, FILM COATED ORAL at 15:56

## 2023-09-25 RX ADMIN — SENNOSIDES AND DOCUSATE SODIUM 2 TABLET: 50; 8.6 TABLET ORAL at 19:51

## 2023-09-25 RX ADMIN — HYDROCORTISONE 5 MG: 5 TABLET ORAL at 14:21

## 2023-09-25 RX ADMIN — POLYETHYLENE GLYCOL 3350 17 G: 17 POWDER, FOR SOLUTION ORAL at 07:53

## 2023-09-25 RX ADMIN — ACETAMINOPHEN 650 MG: 325 TABLET, FILM COATED ORAL at 12:21

## 2023-09-25 RX ADMIN — ACETAMINOPHEN 650 MG: 325 TABLET, FILM COATED ORAL at 04:33

## 2023-09-25 RX ADMIN — MAGNESIUM HYDROXIDE 30 ML: 400 SUSPENSION ORAL at 04:34

## 2023-09-25 RX ADMIN — ACETAMINOPHEN 650 MG: 325 TABLET, FILM COATED ORAL at 19:51

## 2023-09-25 RX ADMIN — HYDROCORTISONE 15 MG: 10 TABLET ORAL at 07:53

## 2023-09-25 ASSESSMENT — ACTIVITIES OF DAILY LIVING (ADL)
ADLS_ACUITY_SCORE: 20
ADLS_ACUITY_SCORE: 20
ADLS_ACUITY_SCORE: 22
ADLS_ACUITY_SCORE: 22
ADLS_ACUITY_SCORE: 20
ADLS_ACUITY_SCORE: 20
ADLS_ACUITY_SCORE: 22
ADLS_ACUITY_SCORE: 20
ADLS_ACUITY_SCORE: 20

## 2023-09-25 NOTE — PROGRESS NOTES
Endocrinology Consult     Yony Loera MRN:[id number] YOB: 1975  Date of Admission:9/21/2023   Primary care provider: Gurmeet Castillo     Reason for visit: Pituitary macroadenoma (H)   Reason for Endocrine consult: Post Operative central diabetes insipidus    HPI:  Yony Loera is a 48 year old male with recently discovered pituitary macroadenoma .Of note, in January 2023 patient was experiencing blurry vision for which he initially saw ophthalmologist.  During the work-up on brain MRI, the patient was found to have heterogeneously enhancing expansile mass measuring 5.6 x 4.3 x 4.3 cm with extension into the suprasellar cistern.  Marked mass effect on the optic chiasm was noted(details of the image in the media section below).  Patient was referred to neurosurgery and endocrinology for further work-up.  Patient saw Dr. Mcgrath on 25 August 2023.     Patient did not have any other complaints at that time such as postural dizziness,loss of consciousness, fatigue excessive thirst , polyuria, polydipsia    Of note, preoperative hormonal work-up also revealed mildly elevated prolactin likely stalk effect, low free T4 with a normal TSH and low serum testosterone with normal FSH likely from the mass effect of the tumor.   Patient is now s/p endoscopic endonasal resection of the pituitary macroadenoma on 21 September 2023.    Preoperatively, serum sodium was 144.  Postoperatively, patient serum sodium up trended to 147, with urine osmolarity of 77, serum osmolarity of 300, urine sodium less than 20 and urine output in 24 hours on the 22nd September of approximately 12 L.  Endocrine consulted for management of postop central DI          Relevant home meds levothyroxine 75 mcg    Relevant orders    Relevant labs  Endocrine hormonal work-up:   TSH (16 August 2023): 2.39  Free T4 (16 August 2023): 0.79  Serum cortisol 8 AM (16 August 2023): 9.7   Serum ACTH(16 August 2023) : 26  FSH(16 August 2023):  4.8  Growth hormone(16 August 2023): 0.1  Insulin growth factor(16 August 2023): 182  Nutropin(16 August 2023): 2.7  Serum testosterone(16 August 2023) 192   Serum prolactin(16 August 2023): 30            Labs/Endocrine related events on  this admission  Serum sodium(21 August 2023) 144  Serum sodium(21 August 2023) 143  Endoscopic endonasal pituitary resection(21 August 2023)  Serum sodium(22 August 2023) 147  Serum sodium(22 August 2023) 147  Urine osmolarity(22 August 2023) 77  Serum osmolarity(22 August 2023) 300  Urine specific gravity 1.003  DDAVP 1 mcg 1 dose(23 August 2023 )   Serum sodium(23 August 2023) 145  Serum sodium(23 August 2023) 144  Postoperative serum cortisol 630 a.m. 10.2  Serum sodium(23 August 2023) 143  Serum sodium (23 August 2023) 141  Urine osmolarity(23 August 2023) 348  Serum cortisol( 24 August 2023) 2.0  Started on Hydrocortisone 15 mg am and 5 mg noon ( 24 August 2023)  Serum sodiuml( 24 August 2023) 143  Serum sodiuml( 24 August 2023)  145  Serum sodiuml( 24 August 2023) 145  Serum sodiuml( 24 August 2023)  143  Serum sodiuml( 24 August 2023)  141   Urine osmolarity( 24 August 2023) 243  Urine osmolarity( 24 August 2023)  233  Urine osmolarity( 25 August 2023)  436  Serum sodiuml( 25 August 2023) 142                   Large lobulated T2 FLAIR intermediate (somewhat similar to gray matter  on T1 and T2) and heterogeneously enhancing expansile mass centered on  the nonvisualized sella measuring 5.6 x 4.3 x 4.3 cm. Areas of marked  T2 hyperintense signal may indicated cystic or necrotic component.  This superiorly extends into suprasellar cistern and widens diaphragma  sella. There is associated diffusion restriction. Nonvisualization of  the pituitary gland and stalk. Marked mass effect upon the optic  chiasm which appears elevated.  This inferiorly this extends into the nasopharynx and involves the  majority of clivus stopping at the junction of the sphenoid bone with  the anterior  occipital bone.  This anteriorly extends through the  sphenoid locules and into the posterior ethmoidal cells. Posteriorly  this extends into the prepontine cistern which is partially effaced.  There is invasion of the bilateral cavernous sinuses (best seen on  image 7 of series 11). No evidence of encasement of either cavernous  segment of the bilateral internal carotid arteries        Input and Output                             ROS:  All 12 systems were reviewed and negative except as mentioned in HPI          Past Medical/Surgical History:       Past Medical History:   Diagnosis Date     Pituitary macroadenoma (H) 08/2023     Past Surgical History:   Procedure Laterality Date     DENTAL SURGERY      wisdom     OPTICAL TRACKING SYSTEM ENDOSCOPIC ENDONASAL SURGERY Bilateral 9/21/2023    Procedure: stealth assisted Endoscopic endonasal transsellar transclival approach for tumor, nasoseptal flap;  Surgeon: Sabas Lacy MD;  Location: [facility] OR             Allergies:     No Known Allergies          PTA Meds:     Prior to Admission medications    Medication Sig Last Dose Taking? Auth Provider Long Term End Date   cetirizine (ZYRTEC) 10 MG tablet Take 1 tablet by mouth daily as needed Past Month Yes Reported, Patient     levothyroxine (SYNTHROID/LEVOTHROID) 75 MCG tablet Take 1 tablet (75 mcg) by mouth daily  Patient taking differently: Take 75 mcg by mouth every morning 9/21/2023 at 0430 Yes Delilah Mcgrath MD Yes    multivitamin w/minerals (MULTI-VITAMIN) tablet Take 1 tablet by mouth every evening Past Week Yes Reported, Patient                        Family History:     Family History   Problem Relation Age of Onset     Anesthesia Reaction Father         PONV     Glaucoma No family hx of      Macular Degeneration No family hx of      Deep Vein Thrombosis (DVT) No family hx of              Social History:     Social History     Tobacco Use     Smoking status: Never     Smokeless tobacco: Never   Substance Use  "Topics     Alcohol use: Yes     Alcohol/week: 2.0 - 3.0 standard drinks of alcohol     Types: 2 - 3 Standard drinks or equivalent per week               Physical Exam:     /86 (BP Location: Right arm, Patient Position: Sitting, Cuff Size: Adult Regular)   Pulse 70   Temp 98.3  F (36.8  C) (Oral)   Resp 12   Ht 1.803 m (5' 11\")   Wt 96.7 kg (213 lb 3.2 oz)   SpO2 97%   BMI 29.74 kg/m      General: NAD  HEENT: EOMI, nonicteric  Chest: Clear to auscultation bilaterally  Cardio: S1-S2 heard, no M/R/G  Abdomen: Soft, nontender, BS +  MSK: No pedal edema  Skin: No rash noted  Neuro: AAOx3, neuro exam grossly nonfocal  Psych: Calm        Labs:                           Prior Labs :    Latest Ref Rng 8/16/2023  7:51 AM   ENDO PIT[facility] LABS[facility]     Adrenal Corticotropin <47 pg/mL 26    Cortisol Serum ug/dL 9.7    FSH 1.5 - 12.4 mIU/mL 4.8    Glucose 70 - 99 mg/dL 106 (H)    GLUCOSE BY METER POCT 70 - 99 mg/dL    Growth Hormone <1.3 ug/L 0.1    Ins Growth Factor 1 67 - 225 ng/mL 182    Lutropin 1.7 - 8.6 mIU/mL 2.7    Osmolality 275 - 295 mmol/kg    Urine Osmolality 100 - 1,200 mmol/kg    Potassium 3.4 - 5.3 mmol/L 4.2    Sodium 136 - 145 mmol/L 138    Sodium Urine mmol/L mmol/L    Testosterone Total 240 - 950 ng/dL 192 (L)    TSH 0.30 - 4.20 uIU/mL 2.39    T4 Free 0.90 - 1.70 ng/dL 0.79 (L)               Assessment and Plan:   Yony Loera is a 48 year old male with recently diagnosed pituitary macroadenoma measuring 5.6 x4.3 x 4.3 cm with mass effect on the optic chiasm admitted for endoscopic endonasal resection of the pituitary macroadenoma.    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 Postoperative central DI     Patient was operated on 21 September 2023.    Preoperative serum sodium was 144, started uptrending on the 22nd up to 147.  Urine osmolarity 77, documented urine output of 12 L within " input of approximately 6.5 L   Received 1 dose of 1 mcg desmopressin subcutaneous on 22nd September evening.      Serum sodium has been stable post DDAVP; Urine output in last 24 hours with output 1870 ml; Urine osm from 23 rd evening went up from 77 to 348;      Considering, the size of the pituitary adenoma with postoperative central diabetes insipidus is expected.  Cannot say for sure if this will be just transient or permanent at the given time.    Plan :   Hold off on further dose of DDAVP;   Every 12-24  hourly's sodium   Strict urine input and output until patient is in the hospital.     Note:  if pt's urine output is 300cc/h for 2 hours consecutively OR 500cc/h in 1 hour, please check the urine specific gravity and sodium level, urine sodium, urine osm. Consider giving DDAVP 1 mcg subQ If pt meets ALL these criterias:   -Urine specific gravity <1.005   -Na > 145      2. Secondary Adrenal Insufficiency   Serum am cortisol  on 24th September 2023: 2   Serum am cortisol on 23 September 2023: 10.2     Patient is hemodyanamically stable and stable electrolytes ;       Plan :   Continue  him on Hydrocortisone 15 am and 5 noon .     3.  Hypothyroidism  Preoperative TSH was 2.39 and T4 free 0.79.  Likely central hypothyroidism due to mass effect of the pituitary  adenoma    Can continue with current dose of levothyroxine 75 mcg.  Will check TSH and free T4 tomorrow      4. Mild Prolactinemia :   Likely due to stalk effect; s/p pituitary adenoma resection.  Will check serum prolactin levels tomorrow.     5. Hypogonadism likely secondary  Will require outpatient endocrinology follow-up with repeat total testosterone levels to guide further management       Patient will follow with Endocrine outpatient ( upcoming appt with ) on October 5, 2023.         Patient labs, chart, and imaging reviewed.   Discussed with oncall attending.     Prosper Hernández MD  Endocrine Fellow  [phone]

## 2023-09-25 NOTE — PLAN OF CARE
Status: POD #4 TSS   Vitals: VSS  Neuros: Intact. Pt denies any headache or metallic/salty taste.   IV: PIV SL   Labs/Electrolytes: Conditional orders for high urinary output. Last spec grave 1.005. Na 141.  Resp/trach: LSC. On RA.  Diet: Regular, strict I&O.  Bowel status: LBM 9/23. Milk of mag given.  : Voiding spontaneously high outputs.   Skin: WNL, bilateral nasal packing UTV.   Pain: Head pressure managed with tylenol.  Activity: Independent in room.  Plan/Updates this shift: NOC hematocrit completed. Continue to monitor outputs.

## 2023-09-25 NOTE — PROGRESS NOTES
"Otolaryngology Progress Note  9/25/2023    S: No acute events overnight. Afebrile, HDS. No Headache. No drainage or metallic taste per RN notes. Endocrine eval and recs for central DI. Hydrocort started 15/5. UOP still slightly high but Na stable <145, Uosm 436 and SG 1.005 -> no more DDAVP given. CTH this AM stable. Tolerating regular diet. Ambulating.     O: /87   Pulse 70   Temp 97.9  F (36.6  C) (Axillary)   Resp 16   Ht 1.803 m (5' 11\")   Wt 96.7 kg (213 lb 3.2 oz)   SpO2 95%   BMI 29.74 kg/m     General: Alert and oriented x 3, No acute distress   HEENT: EOMI without diplopia. Pupils reactive. Oliva splints in place. No nasal drainage.   Pulmonary: Breathing non-labored, no stridor, no accessory muscle use.    Labs:  Na 141 (144. 145)  Osm 300, Usg 1.003 (1.003), Chaya < 20, Uosm 348 (77)    BMP  Recent Labs   Lab 09/25/23  0027 09/24/23  2231 09/24/23  1759 09/24/23  1148 09/24/23  0554 09/23/23  0631 09/23/23  0557 09/22/23  1931 09/22/23  1622 09/22/23  1415 09/22/23  0815 09/22/23  0528 09/21/23  1849    143 145 145 143   < >  --    < >  --    < > 143  --  144   POTASSIUM  --   --   --   --  3.7  --   --   --  4.1  --  3.4  --  3.9   CHLORIDE  --   --   --   --   --   --   --   --   --   --  109*  --  109*   SOURAV  --   --   --   --   --   --   --   --   --   --  9.1  --  8.5*   CO2  --   --   --   --   --   --   --   --   --   --  22  --  22   BUN  --   --   --   --   --   --   --   --   --   --  15.0  --  16.5   CR  --   --   --   --   --   --   --   --   --   --  1.04  --  0.98   GLC  --   --   --   --   --   --  111*  --   --   --  146* 149* 149*    < > = values in this interval not displayed.       CBC  Recent Labs   Lab 09/21/23  1849 09/21/23  1521   WBC 12.7*  --    RBC 3.62*  --    HGB 10.7* 11.0*   HCT 31.9*  --    MCV 88  --    MCH 29.6  --    MCHC 33.5  --    RDW 13.5  --      --        INRNo lab results found in last 7 days.    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 Ho and Dr. Lacy. He is doing well post-operatively. 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 Postoperative DI improving.    - Continue sinus precautions:  - Do not use a straw  - Do not blow your nose  - Do not strain (take stool softeners)  - Open your mouth when you sneeze  - No lifting greater than 20 lbs  - Try not to lean forward and allow blood to build up in your head.  - DI management per endocrine and NSG  - No nasal saline while inpatient. Plan to start at discharge  - Continue to monitor for s/s of CSF leak  - Remainder of care per NSGY  - Call ENT with questions or concerns    -- Patient and above plan discussed with Dr. Vic Atkins MD   Department of Otolaryngology - Head and Neck Surgery, PGY 1  Please page ENT with questions

## 2023-09-25 NOTE — PLAN OF CARE
Pt here for s/p TSS, vss, neuros include: intact. PIV SL, regular diet, voids spont, BM this morning, up ad solomon. Pt continues to have serosanguineous drainage, no other s/sx of CSF leak. However, sample of nasal drainage sent down for testing, results still pending. Pt receiving PRN Tylenol for mild HA, continue to provide when available. Pt ambulating hallways, wife @ bedside and very supportive. Tentative discharge for Tuesday. Continue to care per orders.

## 2023-09-25 NOTE — PLAN OF CARE
Status: POD #3 TSS   Vitals: VSS on RA  Neuros: Intact. Pt denies any headache or metallic/salty taste.   IV: PIV SL replaced today. Receiving IV abx Q 24  Labs/Electrolytes: Last sodium drawn at 1800- results pending. urine osmolality sent at 1900, results pending.  Resp/trach: LSC   Diet: Regular- good intake  Bowel status: LBM 9/23  : Voiding spontaneously   Skin: WNL, bilateral nasal packing UTV.   Pain: Denies   Activity: Independent in room and when ambulating in hallway   Social: family at bedside   Plan/Updates this shift: Pt having scant amounts of serosanguinous drainage, pt dabbing with soft disposable cloths prn. Continue to monitor and follow POC. Head CT in am

## 2023-09-26 VITALS
RESPIRATION RATE: 16 BRPM | OXYGEN SATURATION: 96 % | WEIGHT: 213.2 LBS | TEMPERATURE: 97.8 F | HEART RATE: 62 BPM | HEIGHT: 71 IN | BODY MASS INDEX: 29.85 KG/M2 | SYSTOLIC BLOOD PRESSURE: 129 MMHG | DIASTOLIC BLOOD PRESSURE: 86 MMHG

## 2023-09-26 LAB
B2 TRANSFERRIN FLD QL: ABNORMAL
B2 TRANSFERRIN INTERPRETATION: ABNORMAL
MAGNESIUM SERPL-MCNC: 2.1 MG/DL (ref 1.7–2.3)
PHOSPHATE SERPL-MCNC: 3.9 MG/DL (ref 2.5–4.5)
POTASSIUM SERPL-SCNC: 3.9 MMOL/L (ref 3.4–5.3)
PROLACTIN SERPL 3RD IS-MCNC: 15 NG/ML (ref 4–15)
SODIUM SERPL-SCNC: 141 MMOL/L (ref 136–145)
T4 FREE SERPL-MCNC: 1.13 NG/DL (ref 0.9–1.7)
TSH SERPL DL<=0.005 MIU/L-ACNC: 0.07 UIU/ML (ref 0.3–4.2)

## 2023-09-26 PROCEDURE — 84295 ASSAY OF SERUM SODIUM: CPT

## 2023-09-26 PROCEDURE — 250N000013 HC RX MED GY IP 250 OP 250 PS 637: Performed by: STUDENT IN AN ORGANIZED HEALTH CARE EDUCATION/TRAINING PROGRAM

## 2023-09-26 PROCEDURE — 84100 ASSAY OF PHOSPHORUS: CPT | Performed by: NEUROLOGICAL SURGERY

## 2023-09-26 PROCEDURE — 84146 ASSAY OF PROLACTIN: CPT | Performed by: STUDENT IN AN ORGANIZED HEALTH CARE EDUCATION/TRAINING PROGRAM

## 2023-09-26 PROCEDURE — G0008 ADMIN INFLUENZA VIRUS VAC: HCPCS | Performed by: NEUROLOGICAL SURGERY

## 2023-09-26 PROCEDURE — 36415 COLL VENOUS BLD VENIPUNCTURE: CPT | Performed by: NEUROLOGICAL SURGERY

## 2023-09-26 PROCEDURE — 90686 IIV4 VACC NO PRSV 0.5 ML IM: CPT | Performed by: NEUROLOGICAL SURGERY

## 2023-09-26 PROCEDURE — 83735 ASSAY OF MAGNESIUM: CPT | Performed by: NEUROLOGICAL SURGERY

## 2023-09-26 PROCEDURE — 84132 ASSAY OF SERUM POTASSIUM: CPT | Performed by: NEUROLOGICAL SURGERY

## 2023-09-26 PROCEDURE — 84443 ASSAY THYROID STIM HORMONE: CPT | Performed by: STUDENT IN AN ORGANIZED HEALTH CARE EDUCATION/TRAINING PROGRAM

## 2023-09-26 PROCEDURE — 84439 ASSAY OF FREE THYROXINE: CPT | Performed by: STUDENT IN AN ORGANIZED HEALTH CARE EDUCATION/TRAINING PROGRAM

## 2023-09-26 PROCEDURE — 99232 SBSQ HOSP IP/OBS MODERATE 35: CPT | Mod: GC | Performed by: INTERNAL MEDICINE

## 2023-09-26 PROCEDURE — 250N000011 HC RX IP 250 OP 636: Performed by: NEUROLOGICAL SURGERY

## 2023-09-26 RX ORDER — OXYCODONE HYDROCHLORIDE 5 MG/1
5 TABLET ORAL EVERY 4 HOURS PRN
Qty: 10 TABLET | Refills: 0 | Status: SHIPPED | OUTPATIENT
Start: 2023-09-26 | End: 2024-04-10

## 2023-09-26 RX ORDER — HYDROCORTISONE 5 MG/1
15 TABLET ORAL DAILY
Qty: 60 TABLET | Refills: 0 | Status: SHIPPED | OUTPATIENT
Start: 2023-09-27 | End: 2023-11-10

## 2023-09-26 RX ORDER — HYDROCORTISONE 5 MG/1
5 TABLET ORAL EVERY 24 HOURS
Qty: 60 TABLET | Refills: 0 | Status: SHIPPED | OUTPATIENT
Start: 2023-09-26 | End: 2024-04-10

## 2023-09-26 RX ORDER — DEXAMETHASONE SODIUM PHOSPHATE 4 MG/ML
INJECTION, SOLUTION INTRA-ARTICULAR; INTRALESIONAL; INTRAMUSCULAR; INTRAVENOUS; SOFT TISSUE
Qty: 1 ML | Refills: 3 | Status: SHIPPED | OUTPATIENT
Start: 2023-09-26 | End: 2024-04-10

## 2023-09-26 RX ORDER — POLYETHYLENE GLYCOL 3350 17 G/17G
17 POWDER, FOR SOLUTION ORAL DAILY
Qty: 510 G | Refills: 0 | Status: SHIPPED | OUTPATIENT
Start: 2023-09-26

## 2023-09-26 RX ORDER — ECHINACEA PURPUREA EXTRACT 125 MG
TABLET ORAL
Qty: 88 ML | Refills: 3 | Status: SHIPPED | OUTPATIENT
Start: 2023-09-26

## 2023-09-26 RX ORDER — ACETAMINOPHEN 325 MG/1
650 TABLET ORAL EVERY 4 HOURS PRN
Qty: 30 TABLET | Refills: 0 | Status: SHIPPED | OUTPATIENT
Start: 2023-09-26

## 2023-09-26 RX ADMIN — HYDROCORTISONE 5 MG: 5 TABLET ORAL at 14:10

## 2023-09-26 RX ADMIN — HYDROCORTISONE 15 MG: 10 TABLET ORAL at 08:22

## 2023-09-26 RX ADMIN — OXYCODONE HYDROCHLORIDE 5 MG: 5 TABLET ORAL at 09:41

## 2023-09-26 RX ADMIN — POLYETHYLENE GLYCOL 3350 17 G: 17 POWDER, FOR SOLUTION ORAL at 08:22

## 2023-09-26 RX ADMIN — SENNOSIDES AND DOCUSATE SODIUM 2 TABLET: 50; 8.6 TABLET ORAL at 08:22

## 2023-09-26 RX ADMIN — INFLUENZA A VIRUS A/VICTORIA/4897/2022 IVR-238 (H1N1) ANTIGEN (FORMALDEHYDE INACTIVATED), INFLUENZA A VIRUS A/DARWIN/9/2021 SAN-010 (H3N2) ANTIGEN (FORMALDEHYDE INACTIVATED), INFLUENZA B VIRUS B/PHUKET/3073/2013 ANTIGEN (FORMALDEHYDE INACTIVATED), AND INFLUENZA B VIRUS B/MICHIGAN/01/2021 ANTIGEN (FORMALDEHYDE INACTIVATED) 0.5 ML: 15; 15; 15; 15 INJECTION, SUSPENSION INTRAMUSCULAR at 12:01

## 2023-09-26 RX ADMIN — ACETAMINOPHEN 650 MG: 325 TABLET, FILM COATED ORAL at 12:01

## 2023-09-26 RX ADMIN — LEVOTHYROXINE SODIUM 75 MCG: 75 TABLET ORAL at 08:22

## 2023-09-26 RX ADMIN — ACETAMINOPHEN 650 MG: 325 TABLET, FILM COATED ORAL at 02:34

## 2023-09-26 RX ADMIN — ACETAMINOPHEN 650 MG: 325 TABLET, FILM COATED ORAL at 07:05

## 2023-09-26 ASSESSMENT — VISUAL ACUITY
OU: BASELINE
OU: BASELINE

## 2023-09-26 ASSESSMENT — ACTIVITIES OF DAILY LIVING (ADL)
ADLS_ACUITY_SCORE: 20

## 2023-09-26 NOTE — PROGRESS NOTES
"Otolaryngology Progress Note      S: No acute events overnight. Mild headache. No drainage or metallic taste. Persistent bloody drainage from left nasal cavity. Endocrine following for DI management     O: /85   Pulse 57   Temp 97.8  F (36.6  C) (Oral)   Resp 16   Ht 1.803 m (5' 11\")   Wt 96.7 kg (213 lb 3.2 oz)   SpO2 98%   BMI 29.74 kg/m    Alert and  engaged   EOMI without diplopia. Pupils reactive. Olvia splints in place. No nasal drainage. Oral cavity without drainage along posterior pharyngeal wall  Breathing non-labored, no stridor    Labs:  Na 143 (142)  B2 from 9/25      A/P: Yony Loera is a 48 year old male with a past medical history of pituitary macroadenoma s/p EEA on 9/21 with Dr. Ho and Dr. Lacy. He is doing well post-operatively. 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 Postoperative DI improving.    - Continue to monitor for s/s of CSF leak  - No nasal saline, while inpatient-- will start at discharge.   Oliva splints to stay in place   - Sinus Precautions: Do not use a straw, blow your nose, strain (take stool softeners), open your mouth when you sneeze, no lifting greater than 20 lbs, try not to lean forward and allow blood to build up in your head.    -- Patient and above plan to be discussed with Dr. Vic Avila MD   ENT Resident         "

## 2023-09-26 NOTE — PLAN OF CARE
Status: POD #5 TSS   Vitals: VSS  Neuros: Intact. Denies metallic/salty taste.   IV: PIV SL   Labs/Electrolytes: Pending Beta 2  Resp/trach: LSC. On RA.  Diet: Regular, strict I&O.  Bowel status: LBM 9/25.   : Voiding spontaneously outputs WNL  Skin: WNL, bilateral nasal packing UTV.   Pain: Head ache managed with tylenol.  Activity: Independent in room.  Plan/Updates this shift: Continue to monitor outputs. Discharge home when ready

## 2023-09-26 NOTE — PLAN OF CARE
Discharge time/date: 9/26 1500  Walked or Wheelchair: wheelchair  PIV removed: yes  Reviewed AVS with patient: yes & with significant other   Medication due times added to AVS in EPIC: yes  Verbalized understanding of discharge with teachback: yes   Medications retrieved from pharmacy: yes  Supplies sent home: TSS education handout, I&O tracking sheet, nasal precautions education, graduated cylinder, urinal    Status: POD#5 TSS  Vitals: VSS on RA  Neuros: Intact. Denies salty/metallic taste   IV: removed   Labs/Electrolytes: Na 141. Beta 2 undetermined.   Resp/trach: WNL   Diet: regular   Bowel status: BS+ BM 9/26  : Voids without difficulty   Skin: Intact, bilateral nasal packing in place w/ scant serosanguinous drainage   Pain: managed well with tylenol and oxycodone   Activity: Up ind   Social: family at bedside   Plan: Discharge home after beta 2 results, continue POC   Updates this shift: Flu shot administered prior to discharge, education handout provided.

## 2023-09-26 NOTE — PROGRESS NOTES
Endocrinology Consult     Yony Loera MRN:[id number] YOB: 1975  Date of Admission:9/21/2023   Primary care provider: Gurmeet Castillo     Reason for visit: Pituitary macroadenoma (H)   Reason for Endocrine consult: Post Operative central diabetes insipidus    HPI:  Yony Loera is a 48 year old male with recently discovered pituitary macroadenoma .Of note, in January 2023 patient was experiencing blurry vision for which he initially saw ophthalmologist.  During the work-up on brain MRI, the patient was found to have heterogeneously enhancing expansile mass measuring 5.6 x 4.3 x 4.3 cm with extension into the suprasellar cistern.  Marked mass effect on the optic chiasm was noted(details of the image in the media section below).  Patient was referred to neurosurgery and endocrinology for further work-up.  Patient saw Dr. Mcgrath on 25 August 2023.     Patient did not have any other complaints at that time such as postural dizziness,loss of consciousness, fatigue excessive thirst , polyuria, polydipsia    Of note, preoperative hormonal work-up also revealed mildly elevated prolactin likely stalk effect, low free T4 with a normal TSH and low serum testosterone with normal FSH likely from the mass effect of the tumor.   Patient is now s/p endoscopic endonasal resection of the pituitary macroadenoma on 21 September 2023.    Preoperatively, serum sodium was 144.  Postoperatively, patient serum sodium up trended to 147, with urine osmolarity of 77, serum osmolarity of 300, urine sodium less than 20 and urine output in 24 hours on the 22nd September of approximately 12 L.  Endocrine consulted for management of postop central DI          Relevant home meds levothyroxine 75 mcg    Relevant orders    Relevant labs  Endocrine hormonal work-up:   TSH (16 August 2023): 2.39  Free T4 (16 August 2023): 0.79  Serum cortisol 8 AM (16 August 2023): 9.7   Serum ACTH(16 August 2023) : 26  FSH(16 August 2023):  4.8  Growth hormone(16 August 2023): 0.1  Insulin growth factor(16 August 2023): 182  Nutropin(16 August 2023): 2.7  Serum testosterone(16 August 2023) 192   Serum prolactin(16 August 2023): 30            Labs/Endocrine related events on  this admission  Serum sodium(21 August 2023) 144  Serum sodium(21 August 2023) 143  Endoscopic endonasal pituitary resection(21 August 2023)  Serum sodium(22 August 2023) 147  Serum sodium(22 August 2023) 147  Urine osmolarity(22 August 2023) 77  Serum osmolarity(22 August 2023) 300  Urine specific gravity 1.003  DDAVP 1 mcg 1 dose(23 August 2023 )   Serum sodium(23 August 2023) 145  Serum sodium(23 August 2023) 144  Postoperative serum cortisol 630 a.m. 10.2  Serum sodium(23 August 2023) 143  Serum sodium (23 August 2023) 141  Urine osmolarity(23 August 2023) 348  Serum cortisol( 24 August 2023) 2.0  Started on Hydrocortisone 15 mg am and 5 mg noon ( 24 August 2023)  Serum sodiuml( 24 August 2023) 143  Serum sodiuml( 24 August 2023)  145  Serum sodiuml( 24 August 2023) 145  Serum sodiuml( 24 August 2023)  143  Serum sodiuml( 24 August 2023)  141   Urine osmolarity( 24 August 2023) 243  Urine osmolarity( 24 August 2023)  233  Urine osmolarity( 25 August 2023)  436  Serum sodiuml( 25 August 2023) 142   Serum sodiuml( 25 August 2023) 142  Serum sodiuml( 25 August 2023) 142  Serum sodiuml( 25 August 2023) 143  Serum sodiuml( 26 August 2023) 141  Serum Prolactin ( 26 August 2023) 15( Prolactin was 30 preoperatively )   Serum TSH( 26 August 2023) 0.07  Serum free T4 ( 26 August 2023) 1.13         Large lobulated T2 FLAIR intermediate (somewhat similar to gray matter  on T1 and T2) and heterogeneously enhancing expansile mass centered on  the nonvisualized sella measuring 5.6 x 4.3 x 4.3 cm. Areas of marked  T2 hyperintense signal may indicated cystic or necrotic component.  This superiorly extends into suprasellar cistern and widens diaphragma  sella. There is associated diffusion  restriction. Nonvisualization of  the pituitary gland and stalk. Marked mass effect upon the optic  chiasm which appears elevated.  This inferiorly this extends into the nasopharynx and involves the  majority of clivus stopping at the junction of the sphenoid bone with  the anterior occipital bone.  This anteriorly extends through the  sphenoid locules and into the posterior ethmoidal cells. Posteriorly  this extends into the prepontine cistern which is partially effaced.  There is invasion of the bilateral cavernous sinuses (best seen on  image 7 of series 11). No evidence of encasement of either cavernous  segment of the bilateral internal carotid arteries        Input and Output                             ROS:  All 12 systems were reviewed and negative except as mentioned in HPI          Past Medical/Surgical History:       Past Medical History:   Diagnosis Date    Pituitary macroadenoma (H) 08/2023     Past Surgical History:   Procedure Laterality Date    DENTAL SURGERY      wisdom    OPTICAL TRACKING SYSTEM ENDOSCOPIC ENDONASAL SURGERY Bilateral 9/21/2023    Procedure: stealth assisted Endoscopic endonasal transsellar transclival approach for tumor, nasoseptal flap;  Surgeon: Sabas Lacy MD;  Location:  OR             Allergies:     No Known Allergies          PTA Meds:     Prior to Admission medications    Medication Sig Last Dose Taking? Auth Provider Long Term End Date   cetirizine (ZYRTEC) 10 MG tablet Take 1 tablet by mouth daily as needed Past Month Yes Reported, Patient     levothyroxine (SYNTHROID/LEVOTHROID) 75 MCG tablet Take 1 tablet (75 mcg) by mouth daily  Patient taking differently: Take 75 mcg by mouth every morning 9/21/2023 at 0430 Yes Delilah Mcgrath MD Yes    multivitamin w/minerals (MULTI-VITAMIN) tablet Take 1 tablet by mouth every evening Past Week Yes Reported, Patient                        Family History:     Family History   Problem Relation Age of Onset    Anesthesia  "Reaction Father         PONV    Glaucoma No family hx of     Macular Degeneration No family hx of     Deep Vein Thrombosis (DVT) No family hx of              Social History:     Social History     Tobacco Use    Smoking status: Never    Smokeless tobacco: Never   Substance Use Topics    Alcohol use: Yes     Alcohol/week: 2.0 - 3.0 standard drinks of alcohol     Types: 2 - 3 Standard drinks or equivalent per week               Physical Exam:     /82 (BP Location: Right arm)   Pulse 57   Temp 97.7  F (36.5  C) (Oral)   Resp 14   Ht 1.803 m (5' 11\")   Wt 96.7 kg (213 lb 3.2 oz)   SpO2 97%   BMI 29.74 kg/m      General: NAD  HEENT: EOMI, nonicteric  Chest: Clear to auscultation bilaterally  Cardio: S1-S2 heard, no M/R/G  Abdomen: Soft, nontender, BS +  MSK: No pedal edema  Skin: No rash noted  Neuro: AAOx3, neuro exam grossly nonfocal  Psych: Calm        Labs:                               Component      Latest Ref [facility] 8/16/2023  7:51 AM 9/26/2023  6:31 AM   TSH      0.30 - 4.20 uIU/mL 2.39  0.07 (L)    T4 Free      0.90 - 1.70 ng/dL 0.79 (L)  1.13                    Prior Labs :    Latest Ref [facility] 8/16/2023  7:51 AM   ENDO PITUIT[facility]     Adrenal Corticotropin <47 pg/mL 26    Cortisol Serum ug/dL 9.7    FSH 1.5 - 12.4 mIU/mL 4.8    Glucose 70 - 99 mg/dL 106 (H)    GLUCOSE BY METER POCT 70 - 99 mg/dL    Growth Hormone <1.3 ug/L 0.1    Ins Growth Factor 1 67 - 225 ng/mL 182    Lutropin 1.7 - 8.6 mIU/mL 2.7    Osmolality 275 - 295 mmol/kg    Urine Osmolality 100 - 1,200 mmol/kg    Potassium 3.4 - 5.3 mmol/L 4.2    Sodium 136 - 145 mmol/L 138    Sodium Urine mmol/L mmol/L    Testosterone Total 240 - 950 ng/dL 192 (L)    TSH 0.30 - 4.20 uIU/mL 2.39    T4 Free 0.90 - 1.70 ng/dL 0.79 (L)               Assessment and Plan:   Yony Loera is a 48 year old male with recently diagnosed pituitary macroadenoma measuring 5.6 x4.3 x 4.3 cm with mass effect on the optic chiasm admitted for endoscopic endonasal " "resection of the pituitary macroadenoma.    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    Preoperative serum sodium was 144, started uptrending on the 22nd up to 147.  Urine osmolarity 77, documented urine output of 12 L within input of approximately 6.5 L   Received 1 dose of 1 mcg desmopressin subcutaneous on 22nd September evening.      Serum sodium has been stable post DDAVP; Urine output in last 24 hours with output       Plan :   Hold off on further dose of DDAVP;   Every 12-24  hourly's sodium while patient is in the hospital.   Strict urine input and output until patient is in the hospital.     Note:  if pt's urine output is 300cc/h for 2 hours consecutively OR 500cc/h in 1 hour, please check the urine specific gravity and sodium level, urine sodium, urine osm. Consider giving DDAVP 1 mcg subQ If pt meets ALL these criterias:   -Urine specific gravity <1.005   -Na > 145      2. Secondary Adrenal Insufficiency   Serum am cortisol  on 24th September 2023: 2   Serum am cortisol on 23 September 2023: 10.2     Patient is hemodyanamically stable and stable electrolytes ;       Plan :   Patient can be discharged  on Hydrocortisone 15 am and 5 noon .   Teaching provided about sick day rule \"     If patient is  Ill with fever, diarrhea, respiratory infection, etc.   DOUBLE dose of glucocorticoid for 3 days (or longer until back to baseline)     So  HC 15/5 mg  to be changed to 30/10 mg  in that situation     If the following happens:    Multiple episodes of vomiting, or unable to take PO due to vomiting, dizziness, light-  headedness, etc.Then >    Dexamethasone 4 mg IM ( prescription sent to the pharmacy )    Then seek emergency care       Anesthesia instructions   Hydrocortisone 100 mg IV on call to the operative room, then hydrocortisone " every 8 hours  with taper to maintenance dose depending on the clinical situation over the coming few days                  3.  Hypothyroidism likely central   Preoperative TSH was 2.39 and T4 free 0.79.  Likely central hypothyroidism due to mass effect of the pituitary  adenoma    Post operative TSH is 0.07 Ft4 is 1.13     Plan:   Patient can be discharged on current dose of levothyroxine 75 mcg.        4. Mild Prolactinemia( resolved )  :   Likely due to stalk effect; s/p pituitary adenoma resection.  Serum prolactin preoperatively 30 and post operatively is 15           5. Hypogonadism likely secondary  Will require outpatient endocrinology follow-up with repeat total testosterone levels to guide further management       Patient will follow with Endocrine outpatient ( upcoming appt with ) on October 5, 2023.         Patient labs, chart, and imaging reviewed.   Discussed with oncall attending.     Prosper Hernández MD  Endocrine Fellow  [phone]

## 2023-09-26 NOTE — PROGRESS NOTES
SPIRITUAL HEALTH SERVICES Progress Note  [facility] ([place]) 6A    I visited Yony per Length of Stay. I introduced myself and explained my purpose as part of the spiritual care team.  Yony told he was just cleared to leave, so he was in high spirits and not in need of a visit.    Arelis Kilgore  Chaplain Resident  Pager [phone]    * [facility] remains available 24/7 for emergent requests/referrals, either by having the switchboard page the on-call  or by entering an ASAP/STAT consult in Epic (this will also page the on-call ). Routine Epic consults receive an initial response within 24 hours.*

## 2023-09-27 ENCOUNTER — DOCUMENTATION ONLY (OUTPATIENT)
Dept: NEUROSURGERY | Facility: CLINIC | Age: 48
End: 2023-09-27

## 2023-09-27 ENCOUNTER — TELEPHONE (OUTPATIENT)
Dept: NEUROSURGERY | Facility: CLINIC | Age: 48
End: 2023-09-27
Payer: COMMERCIAL

## 2023-09-27 DIAGNOSIS — D35.2 PITUITARY ADENOMA WITH EXTRASELLAR EXTENSION (H): Primary | ICD-10-CM

## 2023-09-27 NOTE — OP NOTE
Yony Loera  MR: [id number]  : 1975  DOS:2023    Preoperative diagnoses: Pituitary macroadenoma with bilateral hemianopsia    Postoperative diagnosis: Pituitary macroadenoma with bilateral hemianopsia    Procedure:    1.  Stealth guided endoscopic transnasal, trans pterygoid, and trans clival approach to resect pituitary microadenoma.    2.  Left sided nasoseptal flap pedicle from the posterior septal artery    Surgeon: Gary Berrios MD and Sabas Lacy MD, PhD    Resident surgeon: William Avila MD  Anesthesia: General with orotracheal tube    Estimated blood loss: 150 cc    Complications: None    Condition: Patient was transferred stable extubated to the postoperative care unit.    Briefly Mr. The clinic is a 48-year-old gentleman who was recently found to have bilateral hemianopsia.  He was evaluated by neuro ophthalmology and found to have a large pituitary macroadenoma with suprasellar extension, invasion into the clival bone, and invasion into the sphenoid sinus.  The patient was advised to undergo resection of this tumor using a transnasal endoscopic approach.    Procedure: All the risk and benefits of the procedure were explained to the patient.  Informed consent was obtained.  The patient was taken to the operating room on 2023.  The patient was placed in the supine position.  General anesthesia was induced and orotracheal tube was placed with no difficulties.  The operating table was turned 180 degrees.  Allen was placed.  Lower SCDs were placed to the lower extremities.  Following this the patient was placed in the [place] head proctor by the neurosurgery team.  The Stealth guided system was set up and found to be very accurate.  Following this the patient was prepped and draped under sterile fashion for a transnasal endoscopic approach to the middle and posterior cranial fossa.    Timeout was performed by myself and the operating room staff.  Next I used a 0  degree nasal endoscope to gain access into the nasal cavity.  Initial inspection shows septum that is slightly deviated to the left with a septal spur bilaterally.  I initiated my procedure injecting 1% lidocaine with 1 to 100,000 units of epinephrine into the right lateral nasal wall.  Next I began at the right trans pterygoid approach.  I did right uncinectomy, I did an maximum right maxillary antrostomy.  Next I did an anterior and posterior ethmoidectomy.  I resected all of the mucosa from the anterior and posterior ethmoids.  Next I resected the right middle turbinate.  Following this I identified the rene ethmoidalis as well as the right sphenopalatine foramen and right sphenopalatine artery.  The right sphenopalatine artery was cauterized.  Next I resected the right rene ethmoidalis as well as the right posterior bony wall of the maxillary sinus to expose the right pterygopalatine fossa contents.  When I exposed the right lateral recess of the sphenoid sinus I encounter a mucocele that was draining.  There was no tumor on the right lateral recess of the sphenoid sinus.  I entered the right sphenoid sinus and immediately I identified tumor within the sphenoid sinus.  Following this I moved to the left side.  I outlined that left nasoseptal flap pedicle from the left posterior septal artery.  The nasoseptal flap was elevated in the usual fashion and stored in the nasopharynx for further use.  Next I performed up posterior septectomy I exposed the sphenoid sinus to rostrum.  It is important to note that there was tumor invading the sphenoid sinus rostrum.  At this time I removed some of the tumor within the sphenoid sinus.  The mucosa of the right posterior septum was removed.  Creating by nostril approach.  I then remove tumor from the sphenoid sinus.  Some of the dura of the sellar region was exposed.  I did remove the superior turbinates bilaterally to gain more lateral axis.  I remove some of the  sphenoid sinus bony septi.  At this time Dr. Lacy came into the room.  Please see Dr. Lacy's operative report for more detail.  After the tumor was removed the reconstruction was done as following.  It is important to note that we did have a very small amount of CSF leak.  The skull base was repair using 2 layers of DuraGen.  1 layer was placed intracranially and the second layer was placed extracranially over the remaining dura.  Small pieces of Surgicel were applied to the second layer to keep it in place.  Following this I did recover the left-sided nasoseptal flap.  The nasoseptal flap was placed as an outer layer fashion to cover the skull base defect.  The nasoseptal flap was secured using pieces of Surgicel, Gelfoam as well as DuraSeal.  At this time bilateral Oliva splints were applied to the septum.  The nasogastric tube was advanced into the patient's stomach and its contents were suctioned.  At this time the procedure was ended patient was awakened extubated transferred to recovery room under stable conditions.  I was present for the entire length of the case.

## 2023-09-27 NOTE — PROGRESS NOTES
PAPERWORK DOCUMENTATION    How Paperwork is received:  Right Fax    Type of Paperwork: STD    Who is the paperwork for:  Patient    Received Date September 21, 2023    Who Received the paperwork:  Rosanna    Form to be Completed by:  Rosanna    Anticipated Completion Date: September 28th, 2023    Date Completed Form Faxed to Requested Entity: Writer faxed paperwork to Jonathan @ [phone] on September 27th, 2023.  A copy was also scanned into the patient's chart.     ANA Santos  Neurosurgery nurse navigator.

## 2023-09-27 NOTE — TELEPHONE ENCOUNTER
Patient called to discuss [facility] paperwork. Informed him that paperwork was faxed today. He would like a copy sent via [employer].    Patient reports doing well since discharge yesterday. Slept well last night. Has minimal pain, has not taken any oxycodone since discharge. Walking intermittently around home and adhering to activity restrictions and nasal precautions. Has had a bowel movement and continues to take stool softeners. Denies s/s of CSF leak. Using saline spray. Very pleased with his improved vision postop. Drinking to thirst and tracking I&O. Denies any excessive thirst or urination.    Discussed need for labs 3 days and 7 days post-discharge - will schedule these for patient and call him back with appointment confirmations.    Discharge instructions and follow up visits were reviewed. Direct phone number provided: [phone] or after hours/weekends: [phone] (ask for Neurosurgery or ENT on-call resident). Patient has no further questions and verbalized understanding and agreement with current plan.    Lou Menendez RN

## 2023-09-29 ENCOUNTER — LAB (OUTPATIENT)
Dept: LAB | Facility: CLINIC | Age: 48
End: 2023-09-29
Payer: COMMERCIAL

## 2023-09-29 DIAGNOSIS — D35.2 PITUITARY ADENOMA (H): ICD-10-CM

## 2023-09-29 LAB
PATH REPORT.ADDENDUM SPEC: ABNORMAL
PATH REPORT.COMMENTS IMP SPEC: ABNORMAL
PATH REPORT.COMMENTS IMP SPEC: YES
PATH REPORT.FINAL DX SPEC: ABNORMAL
PATH REPORT.GROSS SPEC: ABNORMAL
PATH REPORT.MICROSCOPIC SPEC OTHER STN: ABNORMAL
PATH REPORT.RELEVANT HX SPEC: ABNORMAL
PHOTO IMAGE: ABNORMAL
SODIUM SERPL-SCNC: 135 MMOL/L (ref 135–145)

## 2023-09-29 PROCEDURE — 84295 ASSAY OF SERUM SODIUM: CPT

## 2023-09-29 PROCEDURE — 36415 COLL VENOUS BLD VENIPUNCTURE: CPT

## 2023-10-03 ENCOUNTER — LAB (OUTPATIENT)
Dept: LAB | Facility: CLINIC | Age: 48
End: 2023-10-03
Payer: COMMERCIAL

## 2023-10-03 ENCOUNTER — TELEPHONE (OUTPATIENT)
Dept: NEUROLOGY | Facility: CLINIC | Age: 48
End: 2023-10-03

## 2023-10-03 ENCOUNTER — TELEPHONE (OUTPATIENT)
Dept: NEUROSURGERY | Facility: CLINIC | Age: 48
End: 2023-10-03

## 2023-10-03 DIAGNOSIS — D35.2 PITUITARY ADENOMA WITH EXTRASELLAR EXTENSION (H): ICD-10-CM

## 2023-10-03 LAB
ANION GAP SERPL CALCULATED.3IONS-SCNC: 12 MMOL/L (ref 7–15)
BUN SERPL-MCNC: 13.4 MG/DL (ref 6–20)
CALCIUM SERPL-MCNC: 9.2 MG/DL (ref 8.6–10)
CHLORIDE SERPL-SCNC: 105 MMOL/L (ref 98–107)
CREAT SERPL-MCNC: 1.12 MG/DL (ref 0.67–1.17)
DEPRECATED HCO3 PLAS-SCNC: 26 MMOL/L (ref 22–29)
EGFRCR SERPLBLD CKD-EPI 2021: 80 ML/MIN/1.73M2
GLUCOSE SERPL-MCNC: 120 MG/DL (ref 70–99)
POTASSIUM SERPL-SCNC: 3.9 MMOL/L (ref 3.4–5.3)
SODIUM SERPL-SCNC: 143 MMOL/L (ref 135–145)

## 2023-10-03 PROCEDURE — 80048 BASIC METABOLIC PNL TOTAL CA: CPT

## 2023-10-03 PROCEDURE — 36415 COLL VENOUS BLD VENIPUNCTURE: CPT

## 2023-10-03 NOTE — TELEPHONE ENCOUNTER
[facility] Call Center    Phone Message    May a detailed message be left on voicemail: yes     Reason for Call: Other: Patient is calling in about his input and output of water. He was told to call in to touch base. Please call back.      Action Taken: Message routed to:  [facility] ([facility]    Travel Screening: Not Applicable

## 2023-10-03 NOTE — PATIENT INSTRUCTIONS
1. Please follow-up in clinic in 3 weeks   Maintain sinus precautions and continue saline nasal spray.   2. Please call the ENT clinic with any questions,concerns, new or worsening symptoms.    -Clinic number is [phone]   - Carol's direct line (Dr. Ho's nurse) [phone]

## 2023-10-03 NOTE — TELEPHONE ENCOUNTER
Pt reports feeling really good. Feels intake & output has slowed down a bit today. Not feeling as thirsty. Stopped Tylenol on Sunday, denies any headaches. Denies N/V, weakness. Had BMP drawn today, will follow up with results. He will call back if any changes with I&O or worsening symptoms.

## 2023-10-03 NOTE — TELEPHONE ENCOUNTER
NEUROSURGERY TELEPHONE NOTE    49 yo M POD-12 s/p EEA for pituitary adenoma. He received 1x dose DDAVP while admitted and was instructed to continue tracking I/O outpatient, but was not discharged on any PRN DDAVP. NSGY resident on call was notified that patient has been experiencing high UOP and increased thirst overnight, with -500mL per hour for several hours, feels it is now starting to decrease. He is drinking to thirst. No other symptoms- no HA, vision change, dizziness, nausea/vomiting.    Encouraged patient to continue drinking to thirst and to present for laboratory assessment this am as previously planned. Discussed to call back or present to ED if he develops other symptoms or feels he is unable to keep up with his urine output with po intake.    Dr. Lacy to be notified in am. Staff message sent to endocrinology attending Dr. Mcgrath with whom the patient is scheduled for follow-up on 10/6/23.    Kati Starr MD, PhD  PGY-2 Neurosurgery    Please contact neurosurgery resident on call with questions.    Dial * * [phone], enter [phone] when prompted.

## 2023-10-03 NOTE — PROGRESS NOTES
"Addendum to discharge summary    Clinically Significant Risk Factors Present on Admission                    # Overweight: Estimated body mass index is 29.74 kg/m  as calculated from the following:    Height as of this encounter: 1.803 m (5' 11\").    Weight as of this encounter: 96.7 kg (213 lb 3.2 oz).     # Brain compression      Adolfo Lozada MD  Neurosurgery Resident  Pager: [phone]    "

## 2023-10-05 ENCOUNTER — OFFICE VISIT (OUTPATIENT)
Dept: OTOLARYNGOLOGY | Facility: CLINIC | Age: 48
End: 2023-10-05
Payer: COMMERCIAL

## 2023-10-05 VITALS
HEART RATE: 77 BPM | OXYGEN SATURATION: 98 % | HEIGHT: 71 IN | DIASTOLIC BLOOD PRESSURE: 81 MMHG | WEIGHT: 208 LBS | BODY MASS INDEX: 29.12 KG/M2 | SYSTOLIC BLOOD PRESSURE: 117 MMHG

## 2023-10-05 DIAGNOSIS — D35.2 PITUITARY MACROADENOMA (H): Primary | ICD-10-CM

## 2023-10-05 PROCEDURE — 31237 NSL/SINS NDSC SURG BX POLYPC: CPT | Mod: LT | Performed by: OTOLARYNGOLOGY

## 2023-10-05 ASSESSMENT — PAIN SCALES - GENERAL: PAINLEVEL: NO PAIN (0)

## 2023-10-05 NOTE — PROGRESS NOTES
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  Reconstruction with dura GEN and left-sided nasoseptal flap on 9/21/2023    Neurosurgeon: Sabas zabala    History of Present Illness: Mr. Hutchinson is here in the otolaryngology clinic for his first postoperative visit.  The patient has recovered really well from surgery.  He does not have any symptoms of CSF leak.  He is using the saline solution irrigations.  His vision has been stable.    MEDICATIONS:     Current Outpatient Medications   Medication Sig Dispense Refill    acetaminophen (TYLENOL) 325 MG tablet Take 2 tablets (650 mg) by mouth every 4 hours as needed for other (For optimal non-opioid multimodal pain management to improve pain control.) 30 tablet 0    cetirizine (ZYRTEC) 10 MG tablet Take 1 tablet by mouth daily as needed      dexAMETHasone (DECADRON) 4 MG/ML injection Use 4 mg or dose determined by provider for iontophoresis. 1 mL 3    hydrocortisone (CORTEF) 5 MG tablet Take 3 tablets (15 mg) by mouth daily 60 tablet 0    hydrocortisone (CORTEF) 5 MG tablet Take 1 tablet (5 mg) by mouth every 24 hours 60 tablet 0    levothyroxine (SYNTHROID/LEVOTHROID) 75 MCG tablet Take 1 tablet (75 mcg) by mouth daily (Patient taking differently: Take 75 mcg by mouth every morning) 90 tablet 3    multivitamin w/minerals (MULTI-VITAMIN) tablet Take 1 tablet by mouth every evening      oxyCODONE (ROXICODONE) 5 MG tablet Take 1 tablet (5 mg) by mouth every 4 hours as needed for moderate pain 10 tablet 0    polyethylene glycol (MIRALAX) 17 GM/Dose powder Take 17 g by mouth daily 510 g 0    sodium chloride (OCEAN) 0.65 % nasal spray Please use two sprays in each nostril every two hours while awake. 88 mL 3       ALLERGIES:  No Known Allergies      PAST MEDICAL HISTORY:   Past Medical History:   Diagnosis Date     Pituitary macroadenoma (H) 08/2023        FAMILY HISTORY:    Family History   Problem Relation Age of Onset    Anesthesia Reaction Father         PONV    Glaucoma No family hx of     Macular Degeneration No family hx of     Deep Vein Thrombosis (DVT) No family hx of        REVIEW OF SYSTEMS:  12 point ROS was negative other than the symptoms noted above in the HPI.    Nasal endoscopy.  Consent for nasal endoscopy was obtained.  I confirmed correctness of the procedure and identity of the patient.  Nasal endoscopy was indicated due to recent transnasal endoscopic approach.  The nose was topically decongested and anesthetized.  The nasal endoscope was passed under endoscopic vision.  The Oliva splints were removed.  I did suction and abundant very secretion crust as well as Surgicel Surgicel and Gelfoam.  The rotational flap area is healing very nicely.  There is no evidence of CSF leak no evidence of purulence.     IMPRESSION AND PLAN: 48-year-old gentleman is status post transnasal endoscopic approach to resect large pituitary macroadenoma.  The patient is doing well.  The nasal endoscopy shows good healing for this postoperative time.  Plan is to see him back in 4 to 6 weeks.        Gary Tavares MD, M.D.  Otolaryngology- Head & Neck Surgery  [phone]

## 2023-10-05 NOTE — LETTER
10/5/2023       RE: Yony Loera  [address] [address] [facility] [address]     Dear Colleague,    Thank you for referring your patient, Yony Loera, to the [facility] NOSE [facility] [place] [facility]. Please see a copy of my visit note below.    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  Reconstruction with dura GEN and left-sided nasoseptal flap on 9/21/2023    Neurosurgeon: Sabas zabala    History of Present Illness: Mr. Hutchinson is here in the otolaryngology clinic for his first postoperative visit.  The patient has recovered really well from surgery.  He does not have any symptoms of CSF leak.  He is using the saline solution irrigations.  His vision has been stable.    MEDICATIONS:     Current Outpatient Medications   Medication Sig Dispense Refill    acetaminophen (TYLENOL) 325 MG tablet Take 2 tablets (650 mg) by mouth every 4 hours as needed for other (For optimal non-opioid multimodal pain management to improve pain control.) 30 tablet 0    cetirizine (ZYRTEC) 10 MG tablet Take 1 tablet by mouth daily as needed      dexAMETHasone (DECADRON) 4 MG/ML injection Use 4 mg or dose determined by provider for iontophoresis. 1 mL 3    hydrocortisone (CORTEF) 5 MG tablet Take 3 tablets (15 mg) by mouth daily 60 tablet 0    hydrocortisone (CORTEF) 5 MG tablet Take 1 tablet (5 mg) by mouth every 24 hours 60 tablet 0    levothyroxine (SYNTHROID/LEVOTHROID) 75 MCG tablet Take 1 tablet (75 mcg) by mouth daily (Patient taking differently: Take 75 mcg by mouth every morning) 90 tablet 3    multivitamin w/minerals (MULTI-VITAMIN) tablet Take 1 tablet by mouth every evening      oxyCODONE (ROXICODONE) 5 MG tablet Take 1 tablet (5 mg) by mouth every 4 hours as needed for moderate  pain 10 tablet 0    polyethylene glycol (MIRALAX) 17 GM/Dose powder Take 17 g by mouth daily 510 g 0    sodium chloride (OCEAN) 0.65 % nasal spray Please use two sprays in each nostril every two hours while awake. 88 mL 3       ALLERGIES:  No Known Allergies      PAST MEDICAL HISTORY:   Past Medical History:   Diagnosis Date    Pituitary macroadenoma (H) 08/2023        FAMILY HISTORY:    Family History   Problem Relation Age of Onset    Anesthesia Reaction Father         PONV    Glaucoma No family hx of     Macular Degeneration No family hx of     Deep Vein Thrombosis (DVT) No family hx of        REVIEW OF SYSTEMS:  12 point ROS was negative other than the symptoms noted above in the HPI.    Nasal endoscopy.  Consent for nasal endoscopy was obtained.  I confirmed correctness of the procedure and identity of the patient.  Nasal endoscopy was indicated due to recent transnasal endoscopic approach.  The nose was topically decongested and anesthetized.  The nasal endoscope was passed under endoscopic vision.  The Oliva splints were removed.  I did suction and abundant very secretion crust as well as Surgicel Surgicel and Gelfoam.  The rotational flap area is healing very nicely.  There is no evidence of CSF leak no evidence of purulence.     IMPRESSION AND PLAN: 48-year-old gentleman is status post transnasal endoscopic approach to resect large pituitary macroadenoma.  The patient is doing well.  The nasal endoscopy shows good healing for this postoperative time.  Plan is to see him back in 4 to 6 weeks.        Gary Tavares MD, M.D.  Otolaryngology- Head & Neck Surgery  [phone]           Again, thank you for allowing me to participate in the care of your patient.      Sincerely,    Gary Tavares MD

## 2023-10-06 ENCOUNTER — VIRTUAL VISIT (OUTPATIENT)
Dept: ENDOCRINOLOGY | Facility: CLINIC | Age: 48
End: 2023-10-06
Payer: COMMERCIAL

## 2023-10-06 DIAGNOSIS — E23.0 HYPOPITUITARISM (H): ICD-10-CM

## 2023-10-06 DIAGNOSIS — E23.2 DIABETES INSIPIDUS (H): Primary | ICD-10-CM

## 2023-10-06 PROCEDURE — 99214 OFFICE O/P EST MOD 30 MIN: CPT | Mod: 95 | Performed by: INTERNAL MEDICINE

## 2023-10-06 RX ORDER — DESMOPRESSIN ACETATE 0.1 MG/ML
10 SOLUTION NASAL AT BEDTIME
Qty: 5 ML | Refills: 3 | Status: SHIPPED | OUTPATIENT
Start: 2023-10-06 | End: 2023-12-04

## 2023-10-06 ASSESSMENT — ENCOUNTER SYMPTOMS
HALLUCINATIONS: 0
INCREASED ENERGY: 0
POLYPHAGIA: 0
CHILLS: 0
NIGHT SWEATS: 0
WEIGHT GAIN: 0
FEVER: 0
ALTERED TEMPERATURE REGULATION: 0
WEIGHT LOSS: 0
DECREASED APPETITE: 0
FATIGUE: 1
POLYDIPSIA: 1

## 2023-10-06 ASSESSMENT — PAIN SCALES - GENERAL: PAINLEVEL: NO PAIN (0)

## 2023-10-06 NOTE — PROGRESS NOTES
Video  Start  9:30 am  End 9:50 am  [facility]                                                                   - Endocrinology Initial Consultation -  Reason for visit/consult:  Data Unavailable    Primary care provider: Gurmeet Castillo      Assessment and Plan  48 year old male with endocrine pre op evaluation for pituitary macro adenoma, s/p TSS on 9/21/2023 by Dr. 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        Delilah Mcgrath MD  Staff Physician  Endocrinology and Metabolism  License: [id number]     HPI: A 47 yo male here for the endocrine pre op evaluation of pituitary macroadenoma.   Referral from Dr. Lacy.   In January 2023 he went to the eye exam and noticed that he is missing letters and blurry vision therefore he was referred to retinal specialist in June 2023 and then he was referred to neuro-ophthalmologist Dr. Claire.  Brain MRI was done and found a large lobulated lesion with 5.6 x 4.3 x 4.3 cm.  He was referred to neurosurgeon Dr. Lacy and planned for the surgery.   He is endocrine hormone level was assessed 1 week ago on August 16, 2023 showed free T4 mildly low 0.79 with TSH 2.3, prolactin 30 and FSH 4.8, with total testosterone 192.  His IGF-I normal range 182 and his cortisol was 9.7 and ACTH 26.  Body weight 215 pounds and height 5 feet and 11 inches.  Denied loss of energy, no headache he sleeps okay,  eating okay.   Other medical history including sinusitis with occasionally taking Zyrtec . he walks as a .     Past Medical/Surgical History:  Past Medical History:   Diagnosis Date    Pituitary macroadenoma (H) 08/2023     Past Surgical History:   Procedure Laterality Date    DENTAL SURGERY      wisdom    OPTICAL TRACKING SYSTEM ENDOSCOPIC ENDONASAL SURGERY Bilateral 9/21/2023    Procedure: stealth assisted Endoscopic endonasal transsellar transclival approach for tumor, nasoseptal flap;  Surgeon: Sabas Lacy MD;  Location:  OR       Allergies:  No Known Allergies    Current Medications   Current Outpatient Medications   Medication    acetaminophen (TYLENOL) 325 MG tablet    cetirizine (ZYRTEC) 10 MG tablet    dexAMETHasone (DECADRON) 4 MG/ML injection    hydrocortisone (CORTEF) 5 MG tablet    hydrocortisone (CORTEF) 5 MG tablet    levothyroxine (SYNTHROID/LEVOTHROID) 75 MCG tablet    multivitamin w/minerals (MULTI-VITAMIN) tablet    oxyCODONE (ROXICODONE) 5 MG tablet    polyethylene glycol (MIRALAX) 17 GM/Dose powder    sodium chloride (OCEAN) 0.65 % nasal spray     No current facility-administered medications for this visit.       Family History:  Family History   Problem Relation Age of Onset    Anesthesia Reaction Father         PONV    Glaucoma No family hx of     Macular Degeneration No family hx of     Deep Vein Thrombosis (DVT) No family hx of        Social History:  Social History     Tobacco Use    Smoking status: Never    Smokeless tobacco: Never   Substance Use Topics    Alcohol use: Yes     Alcohol/week: 2.0 - 3.0 standard drinks of alcohol     Types: 2 - 3 Standard drinks or equivalent per week       ROS:  Full review of systems taken with the help of the intake sheet. Otherwise a complete 14 point review of systems was taken and is negative unless stated in the history above.      Physical Exam:   Vitals: There were no vitals taken for this visit.  BMI= There is  no height or weight on file to calculate BMI.   General: well appearing, no acute distress, pleasant and conversant,   Mental Status/neuro: alert and oriented  Face: symmetrical, normal facial color  Eyes: anicteric, no proptosis or lid lag  Resp: normally breathing      Labs : I reviewed data from epic and extract and summarize the pertinent data here.   Lab Results   Component Value Date     08/16/2023      Lab Results   Component Value Date    POTASSIUM 4.2 08/16/2023     Lab Results   Component Value Date    CHLORIDE 103 08/16/2023     Lab Results   Component Value Date    SOURAV 9.9 08/16/2023     Lab Results   Component Value Date    CO2 25 08/16/2023     Lab Results   Component Value Date    BUN 18.9 08/16/2023     Lab Results   Component Value Date    CR 1.07 08/16/2023     Lab Results   Component Value Date     08/16/2023     Lab Results   Component Value Date    TSH 2.39 08/16/2023     Lab Results   Component Value Date    T4 0.79 08/16/2023     No results found for: A1C    No results found for: IGF1  No results found for: LH  Lab Results   Component Value Date    FSH 4.8 08/16/2023     No results found for: ESTROGEN  No results found for: PROLACTIN        MRI Brain: I personally reviewed the original images and agree with the below reports.         Large lobulated T2 FLAIR intermediate (somewhat similar to gray matter  on T1 and T2) and heterogeneously enhancing expansile mass centered on  the nonvisualized sella measuring 5.6 x 4.3 x 4.3 cm. Areas of marked  T2 hyperintense signal may indicated cystic or necrotic component.  This superiorly extends into suprasellar cistern and widens diaphragma  sella. There is associated diffusion restriction. Nonvisualization of  the pituitary gland and stalk. Marked mass effect upon the optic  chiasm which appears elevated.  This inferiorly this extends into the nasopharynx and involves the  majority of clivus stopping at the junction of the sphenoid bone  with  the anterior occipital bone.  This anteriorly extends through the  sphenoid locules and into the posterior ethmoidal cells. Posteriorly  this extends into the prepontine cistern which is partially effaced.  There is invasion of the bilateral cavernous sinuses (best seen on  image 7 of series 11). No evidence of encasement of either cavernous  segment of the bilateral internal carotid arteries.     Regarding the orbits, no definite mass is noted of the globe, conal  structures, or within the orbital fat on either side. The optic nerves

## 2023-10-06 NOTE — LETTER
10/6/2023         RE: Yony Loera  [address] [place] [address]        Dear Colleague,    Thank you for referring your patient, Yony Loera, to the [facility]. Please see a copy of my visit note below.    Video  Start  9:30 am  End 9:50 am  [facility]                                                                   - Endocrinology Initial Consultation -  Reason for visit/consult:  Data Unavailable    Primary care provider: Gurmeet Castillo      Assessment and Plan  48 year old male with endocrine pre op evaluation for pituitary macro adenoma, s/p TSS on 9/21/2023 by Dr. 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        Delilah Mcgrath MD  Staff Physician  Endocrinology and Metabolism  License: [id number]     HPI: A 47 yo male here for the endocrine pre op evaluation of pituitary macroadenoma.   Referral from Dr. Lacy.   In January 2023 he went to the eye exam and noticed that he is missing letters and blurry vision therefore he was referred to retinal specialist in June 2023 and then he was referred to neuro-ophthalmologist Dr. Claire.  Brain MRI was done and found a large lobulated lesion with 5.6 x 4.3 x 4.3 cm.  He was referred to neurosurgeon Dr. Lacy and planned for the surgery.   He is endocrine hormone level was assessed 1 week ago on August 16, 2023 showed free T4 mildly  low 0.79 with TSH 2.3, prolactin 30 and FSH 4.8, with total testosterone 192.  His IGF-I normal range 182 and his cortisol was 9.7 and ACTH 26.  Body weight 215 pounds and height 5 feet and 11 inches.  Denied loss of energy, no headache he sleeps okay, eating okay.   Other medical history including sinusitis with occasionally taking Zyrtec . he walks as a .     Past Medical/Surgical History:  Past Medical History:   Diagnosis Date     Pituitary macroadenoma (H) 08/2023     Past Surgical History:   Procedure Laterality Date     DENTAL SURGERY      wisdom     OPTICAL TRACKING SYSTEM ENDOSCOPIC ENDONASAL SURGERY Bilateral 9/21/2023    Procedure: stealth assisted Endoscopic endonasal transsellar transclival approach for tumor, nasoseptal flap;  Surgeon: Sabas Lacy MD;  Location:  OR       Allergies:  No Known Allergies    Current Medications   Current Outpatient Medications   Medication     acetaminophen (TYLENOL) 325 MG tablet     cetirizine (ZYRTEC) 10 MG tablet     dexAMETHasone (DECADRON) 4 MG/ML injection     hydrocortisone (CORTEF) 5 MG tablet     hydrocortisone (CORTEF) 5 MG tablet     levothyroxine (SYNTHROID/LEVOTHROID) 75 MCG tablet     multivitamin w/minerals (MULTI-VITAMIN) tablet     oxyCODONE (ROXICODONE) 5 MG tablet     polyethylene glycol (MIRALAX) 17 GM/Dose powder     sodium chloride (OCEAN) 0.65 % nasal spray     No current facility-administered medications for this visit.       Family History:  Family History   Problem Relation Age of Onset     Anesthesia Reaction Father         PONV     Glaucoma No family hx of      Macular Degeneration No family hx of      Deep Vein Thrombosis (DVT) No family hx of        Social History:  Social History     Tobacco Use     Smoking status: Never     Smokeless tobacco: Never   Substance Use Topics     Alcohol use: Yes     Alcohol/week: 2.0 - 3.0 standard drinks of alcohol     Types: 2 - 3 Standard drinks or equivalent per week        ROS:  Full review of systems taken with the help of the intake sheet. Otherwise a complete 14 point review of systems was taken and is negative unless stated in the history above.      Physical Exam:   Vitals: There were no vitals taken for this visit.  BMI= There is no height or weight on file to calculate BMI.   General: well appearing, no acute distress, pleasant and conversant,   Mental Status/neuro: alert and oriented  Face: symmetrical, normal facial color  Eyes: anicteric, no proptosis or lid lag  Resp: normally breathing      Labs : I reviewed data from epic and extract and summarize the pertinent data here.   Lab Results   Component Value Date     08/16/2023      Lab Results   Component Value Date    POTASSIUM 4.2 08/16/2023     Lab Results   Component Value Date    CHLORIDE 103 08/16/2023     Lab Results   Component Value Date    SOURAV 9.9 08/16/2023     Lab Results   Component Value Date    CO2 25 08/16/2023     Lab Results   Component Value Date    BUN 18.9 08/16/2023     Lab Results   Component Value Date    CR 1.07 08/16/2023     Lab Results   Component Value Date     08/16/2023     Lab Results   Component Value Date    TSH 2.39 08/16/2023     Lab Results   Component Value Date    T4 0.79 08/16/2023     No results found for: A1C    No results found for: IGF1  No results found for: LH  Lab Results   Component Value Date    FSH 4.8 08/16/2023     No results found for: ESTROGEN  No results found for: PROLACTIN        MRI Brain: I personally reviewed the original images and agree with the below reports.         Large lobulated T2 FLAIR intermediate (somewhat similar to gray matter  on T1 and T2) and heterogeneously enhancing expansile mass centered on  the nonvisualized sella measuring 5.6 x 4.3 x 4.3 cm. Areas of marked  T2 hyperintense signal may indicated cystic or necrotic component.  This superiorly extends into suprasellar cistern and widens diaphragma  sella. There is associated  diffusion restriction. Nonvisualization of  the pituitary gland and stalk. Marked mass effect upon the optic  chiasm which appears elevated.  This inferiorly this extends into the nasopharynx and involves the  majority of clivus stopping at the junction of the sphenoid bone with  the anterior occipital bone.  This anteriorly extends through the  sphenoid locules and into the posterior ethmoidal cells. Posteriorly  this extends into the prepontine cistern which is partially effaced.  There is invasion of the bilateral cavernous sinuses (best seen on  image 7 of series 11). No evidence of encasement of either cavernous  segment of the bilateral internal carotid arteries.     Regarding the orbits, no definite mass is noted of the globe, conal  structures, or within the orbital fat on either side. The optic nerves      Again, thank you for allowing me to participate in the care of your patient.        Sincerely,        Delilah Mcgrath MD

## 2023-10-06 NOTE — PROGRESS NOTES
"Virtual Visit Details    Type of service:  Video Visit     Originating Location (pt. Location): {video visit patient location:260459::\"Home\"}  {PROVIDER LOCATION On-site should be selected for visits conducted from your clinic location or adjoining [facility] hospital, academic office, or other nearby [facility] building. Off-site should be selected for all other provider locations, including home:283071}  Distant Location (provider location):  {virtual location provider:739847}  Platform used for Video Visit: {Virtual Visit Platforms:632376::\"[employer]\"}  "

## 2023-10-06 NOTE — NURSING NOTE
Is the patient currently in the state of [place]? YES    Visit mode:VIDEO    If the visit is dropped, the patient can be reconnected by: VIDEO VISIT: Text to cell phone:   Telephone Information:   Mobile [phone]    and VIDEO VISIT: Send to e-mail at: [email or web address]    Will anyone else be joining the visit? NO  (If patient encounters technical issues they should call [phone] :[id number])    How would you like to obtain your AVS? MyChart    Are changes needed to the allergy or medication list? Yes oxycodone and acitaminophen were for surgery, pt no longer takes.    Reason for visit: RECHSTEPHAN DUQUEF

## 2023-10-10 ENCOUNTER — VIRTUAL VISIT (OUTPATIENT)
Dept: NEUROSURGERY | Facility: CLINIC | Age: 48
End: 2023-10-10
Payer: COMMERCIAL

## 2023-10-10 DIAGNOSIS — D35.2 PITUITARY MACROADENOMA (H): Primary | ICD-10-CM

## 2023-10-10 PROCEDURE — 99024 POSTOP FOLLOW-UP VISIT: CPT | Mod: 93 | Performed by: PHYSICIAN ASSISTANT

## 2023-10-10 NOTE — NURSING NOTE
Is the patient currently in the state of [place]? YES    Visit mode:TELEPHONE    If the visit is dropped, the patient can be reconnected by: TELEPHONE VISIT: Phone number:   Telephone Information:   Mobile [phone]       Will anyone else be joining the visit? NO  (If patient encounters technical issues they should call [phone])    How would you like to obtain your AVS? MyChart    Are changes needed to the allergy or medication list? No    Reason for visit: Follow Up    Kati DUQUEF

## 2023-10-10 NOTE — PROGRESS NOTES
[facility]  Department of Neurosurgery  Center for Skull Base and Pituitary Surgery    Name: Yony Loera  MRN: [id number]  Age: 48 year old  : 1975  10/10/2023      Chief Complaint:   Pituitary adenoma s/p endoscopic endonasal transsellar transclival approach for resection 2023 (Regino/Vic), postoperative visit    History of Present Illness:   Yony Loera is a 48 year old right handed male who is seen today by telephone visit for a postoperative appointment. He underwent the above mentioned procedure with Luke Lacy and Chaitanya after noticing left peripheral vision loss and central blurring, found to have a large pituitary macroadenoma on workup. He got one dose of DDAVP postoperatively. He discharged home 2023. He had a sodium check that returned normal. He has met with ENT and Endocrinology (Dr. Mcgrath) since discharge. He's taking hydrocortisone 10mg/5mg and is on a taper per Endo instructions. He feels well, feels that his energy is better just in the past few days. He denies new symptoms.     He works in [employer] for [employer].      Review of Systems:   Pertinent items are noted in HPI or as in patient entered ROS below, remainder of complete ROS is negative.     Physical Exam:   Exam today is limited due to telephone visit. Speech is normal. Answers questions appropriately.     Assessment:  Pituitary adenoma s/p endoscopic endonasal transsellar transclival approach for resection 2023 (Regino/Vic), postoperative visit    Plan:  We discussed restrictions, back to work, pathology, and follow up plans. We'll see him back in 3 months with MRI prior to appointment, and he was encouraged to reach out sooner with concerns.        Margaux Glover PA-C  Department of Neurosurgery

## 2023-10-10 NOTE — PATIENT INSTRUCTIONS
Follow up with Dr. Lacy in 3 months, with MRI prior to appointment.    Please reach out sooner with new questions or concerns.

## 2023-10-12 ENCOUNTER — PRE VISIT (OUTPATIENT)
Dept: SURGERY | Facility: CLINIC | Age: 48
End: 2023-10-12

## 2023-10-12 ENCOUNTER — PRE VISIT (OUTPATIENT)
Dept: OTOLARYNGOLOGY | Facility: CLINIC | Age: 48
End: 2023-10-12

## 2023-10-25 ENCOUNTER — VIRTUAL VISIT (OUTPATIENT)
Dept: ENDOCRINOLOGY | Facility: CLINIC | Age: 48
End: 2023-10-25
Payer: COMMERCIAL

## 2023-10-25 VITALS — HEIGHT: 71 IN | BODY MASS INDEX: 29.01 KG/M2

## 2023-10-25 DIAGNOSIS — D35.2 PITUITARY ADENOMA (H): ICD-10-CM

## 2023-10-25 DIAGNOSIS — E23.0 HYPOPITUITARISM (H): Primary | ICD-10-CM

## 2023-10-25 PROCEDURE — 99214 OFFICE O/P EST MOD 30 MIN: CPT | Mod: VID | Performed by: INTERNAL MEDICINE

## 2023-10-25 ASSESSMENT — PAIN SCALES - GENERAL: PAINLEVEL: NO PAIN (0)

## 2023-10-25 NOTE — NURSING NOTE
Is the patient currently in the state of [place]? YES    Visit mode:VIDEO    If the visit is dropped, the patient can be reconnected by: VIDEO VISIT: Text to cell phone:   Telephone Information:   Mobile [phone]       Will anyone else be joining the visit? NO  (If patient encounters technical issues they should call [phone]    How would you like to obtain your AVS? MyChart    Are changes needed to the allergy or medication list? Pt stated no changes to allergies and Pt stated no med changes    Reason for visit: LINA DUQUEF

## 2023-10-25 NOTE — PROGRESS NOTES
Virtual Visit Details    Video  Start  5:15 pm  End 5:35 pm  [facility]                                                                   - Endocrinology follow up -  Reason for visit/consult:  Data Unavailable    Primary care provider: Gurmeet Castillo      Assessment and Plan  48 year old male with endocrine pre op evaluation for pituitary macro adenoma, s/p TSS on 9/21/2023 by Dr. 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       Total 30 minutes spent on the date of the encounter doing chart review, history and exam, documentation and further activities as noted above.        Delilah Mcgrath MD  Staff Physician  Endocrinology and Metabolism  License: [id number]     Interval History as of 10/25/2023 : Patient has been doing well but he started to have occasional hot flushes and concerning hormone deficiency.   HPI: A 49 yo male here for the endocrine pre op evaluation of pituitary macroadenoma.   Referral from Dr. Lacy.   In January 2023 he went to the eye exam and noticed that he is missing letters and blurry vision therefore he was referred to retinal specialist in June 2023 and then he was referred to neuro-ophthalmologist Dr. Claire.  Brain MRI was done and found a large lobulated lesion with 5.6 x 4.3 x 4.3 cm.  He was referred to neurosurgeon Dr. Lacy and planned for the surgery.   He is endocrine hormone level was assessed 1 week ago on August 16, 2023 showed free T4 mildly low 0.79 with TSH 2.3, prolactin 30 and FSH 4.8, with total testosterone 192.  His IGF-I normal  range 182 and his cortisol was 9.7 and ACTH 26.  Body weight 215 pounds and height 5 feet and 11 inches.  Denied loss of energy, no headache he sleeps okay, eating okay.   Other medical history including sinusitis with occasionally taking Zyrtec . he walks as a .     Past Medical/Surgical History:  Past Medical History:   Diagnosis Date    Pituitary macroadenoma (H) 08/2023     Past Surgical History:   Procedure Laterality Date    DENTAL SURGERY      wisdom    OPTICAL TRACKING SYSTEM ENDOSCOPIC ENDONASAL SURGERY Bilateral 9/21/2023    Procedure: stealth assisted Endoscopic endonasal transsellar transclival approach for tumor, nasoseptal flap;  Surgeon: Sabas Lacy MD;  Location: [facility] OR       Allergies:  No Known Allergies    Current Medications   Current Outpatient Medications   Medication    acetaminophen (TYLENOL) 325 MG tablet    cetirizine (ZYRTEC) 10 MG tablet    desmopressin (DDAVP) 0.01 % spray    dexAMETHasone (DECADRON) 4 MG/ML injection    hydrocortisone (CORTEF) 5 MG tablet    hydrocortisone (CORTEF) 5 MG tablet    levothyroxine (SYNTHROID/LEVOTHROID) 75 MCG tablet    multivitamin w/minerals (MULTI-VITAMIN) tablet    oxyCODONE (ROXICODONE) 5 MG tablet    polyethylene glycol (MIRALAX) 17 GM/Dose powder    sodium chloride (OCEAN) 0.65 % nasal spray     No current facility-administered medications for this visit.       Family History:  Family History   Problem Relation Age of Onset    Anesthesia Reaction Father         PONV    Glaucoma No family hx of     Macular Degeneration No family hx of     Deep Vein Thrombosis (DVT) No family hx of        Social History:  Social History     Tobacco Use    Smoking status: Never    Smokeless tobacco: Never   Substance Use Topics    Alcohol use: Yes     Alcohol/week: 2.0 - 3.0 standard drinks of alcohol     Types: 2 - 3 Standard drinks or equivalent per week       ROS:  Full review of systems taken with the help of the intake sheet.  "Otherwise a complete 14 point review of systems was taken and is negative unless stated in the history above.      Physical Exam:   Vitals: Ht 1.803 m (5' 11\")   BMI 29.01 kg/m    BMI= Body mass index is 29.01 kg/m .   General: well appearing, no acute distress, pleasant and conversant,   Mental Status/neuro: alert and oriented  Face: symmetrical, normal facial color  Eyes: anicteric, no proptosis or lid lag  Resp: normally breathing      Labs : I reviewed data from epic and extract and summarize the pertinent data here.   Lab Results   Component Value Date     08/16/2023      Lab Results   Component Value Date    POTASSIUM 4.2 08/16/2023     Lab Results   Component Value Date    CHLORIDE 103 08/16/2023     Lab Results   Component Value Date    SOURAV 9.9 08/16/2023     Lab Results   Component Value Date    CO2 25 08/16/2023     Lab Results   Component Value Date    BUN 18.9 08/16/2023     Lab Results   Component Value Date    CR 1.07 08/16/2023     Lab Results   Component Value Date     08/16/2023     Lab Results   Component Value Date    TSH 2.39 08/16/2023     Lab Results   Component Value Date    T4 0.79 08/16/2023     No results found for: A1C    No results found for: IGF1  No results found for: LH  Lab Results   Component Value Date    FSH 4.8 08/16/2023     No results found for: ESTROGEN  No results found for: PROLACTIN        MRI Brain: I personally reviewed the original images and agree with the below reports.         Large lobulated T2 FLAIR intermediate (somewhat similar to gray matter  on T1 and T2) and heterogeneously enhancing expansile mass centered on  the nonvisualized sella measuring 5.6 x 4.3 x 4.3 cm. Areas of marked  T2 hyperintense signal may indicated cystic or necrotic component.  This superiorly extends into suprasellar cistern and widens diaphragma  sella. There is associated diffusion restriction. Nonvisualization of  the pituitary gland and stalk. Marked mass effect upon the " optic  chiasm which appears elevated.  This inferiorly this extends into the nasopharynx and involves the  majority of clivus stopping at the junction of the sphenoid bone with  the anterior occipital bone.  This anteriorly extends through the  sphenoid locules and into the posterior ethmoidal cells. Posteriorly  this extends into the prepontine cistern which is partially effaced.  There is invasion of the bilateral cavernous sinuses (best seen on  image 7 of series 11). No evidence of encasement of either cavernous  segment of the bilateral internal carotid arteries.     Regarding the orbits, no definite mass is noted of the globe, conal  structures, or within the orbital fat on either side. The optic nerves

## 2023-10-25 NOTE — LETTER
10/25/2023       RE: Yony Loera  [address] [place] [address]     Dear Colleague,    Thank you for referring your patient, Yony Loera, to the [facility] ENDOCRINOLOGY [facility]INIC [place] at [facility]. Please see a copy of my visit note below.    Virtual Visit Details    Video  Start  5:15 pm  End 5:35 pm  [facility]                                                                   - Endocrinology follow up -  Reason for visit/consult:  Data Unavailable    Primary care provider: Gurmeet Castillo      Assessment and Plan  48 year old male with endocrine pre op evaluation for pituitary macro adenoma, s/p TSS on 9/21/2023 by Dr. 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        Delilah Mcgrath MD  Staff Physician  Endocrinology and Metabolism  License: [id number]     Interval History as of 10/25/2023 : Patient has been doing well but he started to have occasional hot flushes and concerning hormone deficiency.   HPI: A 49 yo male here for the endocrine pre op evaluation of pituitary macroadenoma.   Referral from Dr. Lacy.   In January 2023 he went to the eye exam and noticed that he is missing letters and blurry vision therefore he was referred to retinal specialist in June 2023 and then he was referred to neuro-ophthalmologist Dr. Claire.  Brain MRI was done and found a large lobulated lesion with 5.6 x 4.3 x 4.3 cm.  He was referred to neurosurgeon  Dr. Lacy and planned for the surgery.   He is endocrine hormone level was assessed 1 week ago on August 16, 2023 showed free T4 mildly low 0.79 with TSH 2.3, prolactin 30 and FSH 4.8, with total testosterone 192.  His IGF-I normal range 182 and his cortisol was 9.7 and ACTH 26.  Body weight 215 pounds and height 5 feet and 11 inches.  Denied loss of energy, no headache he sleeps okay, eating okay.   Other medical history including sinusitis with occasionally taking Zyrtec . he walks as a .     Past Medical/Surgical History:  Past Medical History:   Diagnosis Date    Pituitary macroadenoma (H) 08/2023     Past Surgical History:   Procedure Laterality Date    DENTAL SURGERY      wisdom    OPTICAL TRACKING SYSTEM ENDOSCOPIC ENDONASAL SURGERY Bilateral 9/21/2023    Procedure: stealth assisted Endoscopic endonasal transsellar transclival approach for tumor, nasoseptal flap;  Surgeon: Sabas Lacy MD;  Location: [facility] OR       Allergies:  No Known Allergies    Current Medications   Current Outpatient Medications   Medication    acetaminophen (TYLENOL) 325 MG tablet    cetirizine (ZYRTEC) 10 MG tablet    desmopressin (DDAVP) 0.01 % spray    dexAMETHasone (DECADRON) 4 MG/ML injection    hydrocortisone (CORTEF) 5 MG tablet    hydrocortisone (CORTEF) 5 MG tablet    levothyroxine (SYNTHROID/LEVOTHROID) 75 MCG tablet    multivitamin w/minerals (MULTI-VITAMIN) tablet    oxyCODONE (ROXICODONE) 5 MG tablet    polyethylene glycol (MIRALAX) 17 GM/Dose powder    sodium chloride (OCEAN) 0.65 % nasal spray     No current facility-administered medications for this visit.       Family History:  Family History   Problem Relation Age of Onset    Anesthesia Reaction Father         PONV    Glaucoma No family hx of     Macular Degeneration No family hx of     Deep Vein Thrombosis (DVT) No family hx of        Social History:  Social History     Tobacco Use    Smoking status: Never    Smokeless tobacco:  "Never   Substance Use Topics    Alcohol use: Yes     Alcohol/week: 2.0 - 3.0 standard drinks of alcohol     Types: 2 - 3 Standard drinks or equivalent per week       ROS:  Full review of systems taken with the help of the intake sheet. Otherwise a complete 14 point review of systems was taken and is negative unless stated in the history above.      Physical Exam:   Vitals: Ht 1.803 m (5' 11\")   BMI 29.01 kg/m    BMI= Body mass index is 29.01 kg/m .   General: well appearing, no acute distress, pleasant and conversant,   Mental Status/neuro: alert and oriented  Face: symmetrical, normal facial color  Eyes: anicteric, no proptosis or lid lag  Resp: normally breathing      Labs : I reviewed data from epic and extract and summarize the pertinent data here.   Lab Results   Component Value Date     08/16/2023      Lab Results   Component Value Date    POTASSIUM 4.2 08/16/2023     Lab Results   Component Value Date    CHLORIDE 103 08/16/2023     Lab Results   Component Value Date    SOURAV 9.9 08/16/2023     Lab Results   Component Value Date    CO2 25 08/16/2023     Lab Results   Component Value Date    BUN 18.9 08/16/2023     Lab Results   Component Value Date    CR 1.07 08/16/2023     Lab Results   Component Value Date     08/16/2023     Lab Results   Component Value Date    TSH 2.39 08/16/2023     Lab Results   Component Value Date    T4 0.79 08/16/2023     No results found for: A1C    No results found for: IGF1  No results found for: LH  Lab Results   Component Value Date    FSH 4.8 08/16/2023     No results found for: ESTROGEN  No results found for: PROLACTIN        MRI Brain: I personally reviewed the original images and agree with the below reports.         Large lobulated T2 FLAIR intermediate (somewhat similar to gray matter  on T1 and T2) and heterogeneously enhancing expansile mass centered on  the nonvisualized sella measuring 5.6 x 4.3 x 4.3 cm. Areas of marked  T2 hyperintense signal may indicated " cystic or necrotic component.  This superiorly extends into suprasellar cistern and widens diaphragma  sella. There is associated diffusion restriction. Nonvisualization of  the pituitary gland and stalk. Marked mass effect upon the optic  chiasm which appears elevated.  This inferiorly this extends into the nasopharynx and involves the  majority of clivus stopping at the junction of the sphenoid bone with  the anterior occipital bone.  This anteriorly extends through the  sphenoid locules and into the posterior ethmoidal cells. Posteriorly  this extends into the prepontine cistern which is partially effaced.  There is invasion of the bilateral cavernous sinuses (best seen on  image 7 of series 11). No evidence of encasement of either cavernous  segment of the bilateral internal carotid arteries.     Regarding the orbits, no definite mass is noted of the globe, conal  structures, or within the orbital fat on either side. The optic nerves

## 2023-10-26 ENCOUNTER — LAB (OUTPATIENT)
Dept: LAB | Facility: CLINIC | Age: 48
End: 2023-10-26
Payer: COMMERCIAL

## 2023-10-26 DIAGNOSIS — E23.2 DIABETES INSIPIDUS (H): ICD-10-CM

## 2023-10-26 DIAGNOSIS — E23.0 HYPOPITUITARISM (H): ICD-10-CM

## 2023-10-26 DIAGNOSIS — D35.2 PITUITARY ADENOMA WITH EXTRASELLAR EXTENSION (H): ICD-10-CM

## 2023-10-26 LAB
ANION GAP SERPL CALCULATED.3IONS-SCNC: 13 MMOL/L (ref 7–15)
BUN SERPL-MCNC: 16.5 MG/DL (ref 6–20)
CALCIUM SERPL-MCNC: 9.6 MG/DL (ref 8.6–10)
CHLORIDE SERPL-SCNC: 104 MMOL/L (ref 98–107)
CREAT SERPL-MCNC: 1.21 MG/DL (ref 0.67–1.17)
DEPRECATED HCO3 PLAS-SCNC: 23 MMOL/L (ref 22–29)
EGFRCR SERPLBLD CKD-EPI 2021: 73 ML/MIN/1.73M2
GLUCOSE SERPL-MCNC: 108 MG/DL (ref 70–99)
POTASSIUM SERPL-SCNC: 3.7 MMOL/L (ref 3.4–5.3)
SODIUM SERPL-SCNC: 140 MMOL/L (ref 135–145)
SP GR UR STRIP: 1.02 (ref 1–1.03)
T4 FREE SERPL-MCNC: 1.49 NG/DL (ref 0.9–1.7)
TSH SERPL DL<=0.005 MIU/L-ACNC: 0.01 UIU/ML (ref 0.3–4.2)

## 2023-10-26 PROCEDURE — 84403 ASSAY OF TOTAL TESTOSTERONE: CPT

## 2023-10-26 PROCEDURE — 84305 ASSAY OF SOMATOMEDIN: CPT | Mod: 90

## 2023-10-26 PROCEDURE — 80048 BASIC METABOLIC PNL TOTAL CA: CPT

## 2023-10-26 PROCEDURE — 84443 ASSAY THYROID STIM HORMONE: CPT

## 2023-10-26 PROCEDURE — 84439 ASSAY OF FREE THYROXINE: CPT

## 2023-10-26 PROCEDURE — 81003 URINALYSIS AUTO W/O SCOPE: CPT

## 2023-10-26 PROCEDURE — 36415 COLL VENOUS BLD VENIPUNCTURE: CPT

## 2023-10-29 LAB — TESTOST SERPL-MCNC: 4 NG/DL (ref 240–950)

## 2023-11-02 ENCOUNTER — OFFICE VISIT (OUTPATIENT)
Dept: OTOLARYNGOLOGY | Facility: CLINIC | Age: 48
End: 2023-11-02
Payer: COMMERCIAL

## 2023-11-02 VITALS
OXYGEN SATURATION: 99 % | SYSTOLIC BLOOD PRESSURE: 109 MMHG | HEART RATE: 98 BPM | WEIGHT: 206 LBS | HEIGHT: 71 IN | DIASTOLIC BLOOD PRESSURE: 64 MMHG | BODY MASS INDEX: 28.84 KG/M2

## 2023-11-02 DIAGNOSIS — D35.2 PITUITARY MACROADENOMA (H): Primary | ICD-10-CM

## 2023-11-02 DIAGNOSIS — E29.1 HYPOGONADISM MALE: Primary | ICD-10-CM

## 2023-11-02 PROCEDURE — 99024 POSTOP FOLLOW-UP VISIT: CPT | Performed by: OTOLARYNGOLOGY

## 2023-11-02 RX ORDER — NEEDLES, DISPOSABLE 25GX5/8"
NEEDLE, DISPOSABLE MISCELLANEOUS
Qty: 14 EACH | Refills: 3 | Status: SHIPPED | OUTPATIENT
Start: 2023-11-02

## 2023-11-02 ASSESSMENT — PAIN SCALES - GENERAL: PAINLEVEL: NO PAIN (0)

## 2023-11-02 NOTE — PATIENT INSTRUCTIONS
1. Please follow-up in clinic as needed  2. Please call the ENT clinic with any questions,concerns, new or worsening symptoms.    -Clinic number is [phone]   - Carol's direct line (Dr. Ho's nurse) [phone]

## 2023-11-02 NOTE — LETTER
11/2/2023       RE: Yony Loera  [address] [place] [address]     Dear Colleague,    Thank you for referring your patient, Yony Loera, to the [facility] EAR NOSE AND THROAT CLINIC [place] at [facility]. Please see a copy of my visit note below.    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  RECONSTRUCTION WITH DURA GEN AND LEFT-SIDED NASOSEPTAL FLAP ON 9/21/2023     NEUROSURGEON: MAZIN DELUNA      History of Present Illness: 48-year-old gentleman here in the otolaryngology clinic for postoperative follow-up after transnasal endoscopic approach to the sellar region.  Patient is doing well.  He states that his sense of smell is coming back.  As well as his sense of taste.  He denies any purulent rhinorrhea.  No symptoms of CSF leak.  No visual changes.  No epistaxis.    MEDICATIONS:     Current Outpatient Medications   Medication Sig Dispense Refill     acetaminophen (TYLENOL) 325 MG tablet Take 2 tablets (650 mg) by mouth every 4 hours as needed for other (For optimal non-opioid multimodal pain management to improve pain control.) 30 tablet 0     cetirizine (ZYRTEC) 10 MG tablet Take 1 tablet by mouth daily as needed       desmopressin (DDAVP) 0.01 % spray Spray 1 spray (10 mcg) in nostril at bedtime 5 mL 3     dexAMETHasone (DECADRON) 4 MG/ML injection Use 4 mg or dose determined by provider for iontophoresis. 1 mL 3     hydrocortisone (CORTEF) 5 MG tablet Take 3 tablets (15 mg) by mouth daily 60 tablet 0     hydrocortisone (CORTEF) 5 MG tablet Take 1 tablet (5 mg) by mouth every 24 hours 60 tablet 0     levothyroxine (SYNTHROID/LEVOTHROID) 75 MCG tablet Take 1 tablet (75 mcg) by mouth daily (Patient taking differently: Take 75 mcg by mouth every morning) 90 tablet 3      "multivitamin w/minerals (MULTI-VITAMIN) tablet Take 1 tablet by mouth every evening       needle, disp, (B-D HYPODERMIC NEEDLE) 21G X 1\" MISC Use to withdraw medication  from vial weekly 14 each 3     polyethylene glycol (MIRALAX) 17 GM/Dose powder Take 17 g by mouth daily 510 g 0     sodium chloride (OCEAN) 0.65 % nasal spray Please use two sprays in each nostril every two hours while awake. 88 mL 3     syringe/needle, disp, 23G X 1\" 3 ML MISC Use to inject testosterone weekly 14 each 3     testosterone cypionate (DEPOTESTOSTERONE) 200 MG/ML injection Inject 0.5 mLs (100 mg) into the muscle once a week 2 mL 5     oxyCODONE (ROXICODONE) 5 MG tablet Take 1 tablet (5 mg) by mouth every 4 hours as needed for moderate pain 10 tablet 0       ALLERGIES:  No Known Allergies      PAST MEDICAL HISTORY:   Past Medical History:   Diagnosis Date     Pituitary macroadenoma (H) 08/2023        FAMILY HISTORY:    Family History   Problem Relation Age of Onset     Anesthesia Reaction Father         PONV     Glaucoma No family hx of      Macular Degeneration No family hx of      Deep Vein Thrombosis (DVT) No family hx of        REVIEW OF SYSTEMS:  12 point ROS was negative other than the symptoms noted above in the HPI.      Nasal endoscopy: Consent for nasal endoscopy was obtained.  I confirmed correctness of the procedure and identity of the patient.  Nasal endoscopy was indicated due to recent transnasal endoscopic approach.  The nose was topically decongested and anesthetized.  The nasal endoscope was passed under endoscopic vision.  Septum is in the midline.  The posterior septectomy is now healed.  The nasoseptal flap is very well-healed.  No evidence of CSF leak or purulence.    IMPRESSION AND PLAN: 56-year-old male status post transnasal endoscopic approach to resect pituitary macroadenoma.  Patient is doing well.  Today's nasal endoscopy very good healing.  We are going to see him as a as needed basis.      Gary BRANDON" MD Tavares M.D.  Otolaryngology- Head & Neck Surgery  [phone]

## 2023-11-02 NOTE — PROGRESS NOTES
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  RECONSTRUCTION WITH DURA GEN AND LEFT-SIDED NASOSEPTAL FLAP ON 9/21/2023     NEUROSURGEON: MAZIN DELUNA      History of Present Illness: 48-year-old gentleman here in the otolaryngology clinic for postoperative follow-up after transnasal endoscopic approach to the sellar region.  Patient is doing well.  He states that his sense of smell is coming back.  As well as his sense of taste.  He denies any purulent rhinorrhea.  No symptoms of CSF leak.  No visual changes.  No epistaxis.    MEDICATIONS:     Current Outpatient Medications   Medication Sig Dispense Refill    acetaminophen (TYLENOL) 325 MG tablet Take 2 tablets (650 mg) by mouth every 4 hours as needed for other (For optimal non-opioid multimodal pain management to improve pain control.) 30 tablet 0    cetirizine (ZYRTEC) 10 MG tablet Take 1 tablet by mouth daily as needed      desmopressin (DDAVP) 0.01 % spray Spray 1 spray (10 mcg) in nostril at bedtime 5 mL 3    dexAMETHasone (DECADRON) 4 MG/ML injection Use 4 mg or dose determined by provider for iontophoresis. 1 mL 3    hydrocortisone (CORTEF) 5 MG tablet Take 3 tablets (15 mg) by mouth daily 60 tablet 0    hydrocortisone (CORTEF) 5 MG tablet Take 1 tablet (5 mg) by mouth every 24 hours 60 tablet 0    levothyroxine (SYNTHROID/LEVOTHROID) 75 MCG tablet Take 1 tablet (75 mcg) by mouth daily (Patient taking differently: Take 75 mcg by mouth every morning) 90 tablet 3    multivitamin w/minerals (MULTI-VITAMIN) tablet Take 1 tablet by mouth every evening      needle, disp, (B-D HYPODERMIC NEEDLE) 21G X 1\" MISC Use to withdraw medication  from vial weekly 14 each 3    polyethylene glycol (MIRALAX) 17 GM/Dose powder Take 17 g by mouth daily 510 g 0    sodium chloride (OCEAN) 0.65 % nasal spray Please " "use two sprays in each nostril every two hours while awake. 88 mL 3    syringe/needle, disp, 23G X 1\" 3 ML MISC Use to inject testosterone weekly 14 each 3    testosterone cypionate (DEPOTESTOSTERONE) 200 MG/ML injection Inject 0.5 mLs (100 mg) into the muscle once a week 2 mL 5    oxyCODONE (ROXICODONE) 5 MG tablet Take 1 tablet (5 mg) by mouth every 4 hours as needed for moderate pain 10 tablet 0       ALLERGIES:  No Known Allergies      PAST MEDICAL HISTORY:   Past Medical History:   Diagnosis Date    Pituitary macroadenoma (H) 08/2023        FAMILY HISTORY:    Family History   Problem Relation Age of Onset    Anesthesia Reaction Father         PONV    Glaucoma No family hx of     Macular Degeneration No family hx of     Deep Vein Thrombosis (DVT) No family hx of        REVIEW OF SYSTEMS:  12 point ROS was negative other than the symptoms noted above in the HPI.      Nasal endoscopy: Consent for nasal endoscopy was obtained.  I confirmed correctness of the procedure and identity of the patient.  Nasal endoscopy was indicated due to recent transnasal endoscopic approach.  The nose was topically decongested and anesthetized.  The nasal endoscope was passed under endoscopic vision.  Septum is in the midline.  The posterior septectomy is now healed.  The nasoseptal flap is very well-healed.  No evidence of CSF leak or purulence.    IMPRESSION AND PLAN: 56-year-old male status post transnasal endoscopic approach to resect pituitary macroadenoma.  Patient is doing well.  Today's nasal endoscopy very good healing.  We are going to see him as a as needed basis.      Gary Tavares MD, M.D.  Otolaryngology- Head & Neck Surgery  [phone]       "

## 2023-11-03 LAB
INSULIN GROWTH FACTOR 1 (EXTERNAL): 88 NG/ML (ref 50–317)
INSULIN GROWTH FACTOR I SD SCORE (EXTERNAL): -1 SD

## 2023-11-10 DIAGNOSIS — D35.2 PITUITARY MACROADENOMA (H): ICD-10-CM

## 2023-11-10 RX ORDER — HYDROCORTISONE 5 MG/1
TABLET ORAL
Qty: 270 TABLET | Refills: 3 | Status: SHIPPED | OUTPATIENT
Start: 2023-11-10 | End: 2024-09-11

## 2023-11-13 ENCOUNTER — LAB (OUTPATIENT)
Dept: LAB | Facility: CLINIC | Age: 48
End: 2023-11-13
Payer: COMMERCIAL

## 2023-11-13 DIAGNOSIS — E23.0 HYPOPITUITARISM (H): ICD-10-CM

## 2023-11-13 DIAGNOSIS — E04.9 GOITER: Primary | ICD-10-CM

## 2023-11-13 DIAGNOSIS — D35.2 PITUITARY MACROADENOMA (H): ICD-10-CM

## 2023-11-13 LAB
ANION GAP SERPL CALCULATED.3IONS-SCNC: 10 MMOL/L (ref 7–15)
BUN SERPL-MCNC: 10.6 MG/DL (ref 6–20)
CALCIUM SERPL-MCNC: 9.1 MG/DL (ref 8.6–10)
CHLORIDE SERPL-SCNC: 107 MMOL/L (ref 98–107)
CORTIS SERPL-MCNC: 1.2 UG/DL
CREAT SERPL-MCNC: 0.94 MG/DL (ref 0.67–1.17)
DEPRECATED HCO3 PLAS-SCNC: 24 MMOL/L (ref 22–29)
EGFRCR SERPLBLD CKD-EPI 2021: >90 ML/MIN/1.73M2
GLUCOSE SERPL-MCNC: 97 MG/DL (ref 70–99)
POTASSIUM SERPL-SCNC: 4.2 MMOL/L (ref 3.4–5.3)
SODIUM SERPL-SCNC: 141 MMOL/L (ref 135–145)

## 2023-11-13 PROCEDURE — 36415 COLL VENOUS BLD VENIPUNCTURE: CPT

## 2023-11-13 PROCEDURE — 84403 ASSAY OF TOTAL TESTOSTERONE: CPT

## 2023-11-13 PROCEDURE — 82533 TOTAL CORTISOL: CPT

## 2023-11-13 PROCEDURE — 80048 BASIC METABOLIC PNL TOTAL CA: CPT

## 2023-11-15 LAB — TESTOST SERPL-MCNC: 713 NG/DL (ref 240–950)

## 2023-12-04 ENCOUNTER — MYC REFILL (OUTPATIENT)
Dept: ENDOCRINOLOGY | Facility: CLINIC | Age: 48
End: 2023-12-04
Payer: COMMERCIAL

## 2023-12-04 DIAGNOSIS — E23.2 DIABETES INSIPIDUS (H): ICD-10-CM

## 2023-12-04 DIAGNOSIS — E29.1 HYPOGONADISM MALE: ICD-10-CM

## 2023-12-05 RX ORDER — DESMOPRESSIN ACETATE 0.1 MG/ML
10 SOLUTION NASAL AT BEDTIME
Qty: 5 ML | Refills: 3 | Status: SHIPPED | OUTPATIENT
Start: 2023-12-05 | End: 2024-03-08

## 2023-12-06 RX ORDER — TESTOSTERONE CYPIONATE 200 MG/ML
100 INJECTION, SOLUTION INTRAMUSCULAR WEEKLY
Qty: 2 ML | Refills: 5 | Status: SHIPPED | OUTPATIENT
Start: 2023-12-06 | End: 2024-03-21

## 2023-12-08 ENCOUNTER — ANCILLARY PROCEDURE (OUTPATIENT)
Dept: ULTRASOUND IMAGING | Facility: CLINIC | Age: 48
End: 2023-12-08
Attending: NEUROLOGICAL SURGERY
Payer: COMMERCIAL

## 2023-12-08 DIAGNOSIS — R60.0 LOWER EXTREMITY EDEMA: ICD-10-CM

## 2023-12-08 PROCEDURE — 93970 EXTREMITY STUDY: CPT | Performed by: RADIOLOGY

## 2023-12-13 DIAGNOSIS — F40.240 CLAUSTROPHOBIA: Primary | ICD-10-CM

## 2023-12-13 NOTE — TELEPHONE ENCOUNTER
Left 2nd message- Sched in person follow up appt in January with Margaux Glover and MRI prior to visit.

## 2023-12-15 RX ORDER — DIAZEPAM 2 MG
TABLET ORAL
Qty: 2 TABLET | Refills: 0 | Status: SHIPPED | OUTPATIENT
Start: 2023-12-15 | End: 2024-04-10

## 2023-12-18 ENCOUNTER — MYC MEDICAL ADVICE (OUTPATIENT)
Dept: OTOLARYNGOLOGY | Facility: CLINIC | Age: 48
End: 2023-12-18
Payer: COMMERCIAL

## 2023-12-18 DIAGNOSIS — J01.90 ACUTE SINUS INFECTION: Primary | ICD-10-CM

## 2023-12-20 NOTE — TELEPHONE ENCOUNTER
Signed Prescriptions:                        Disp   Refills    COMPOUNDED NON-CONTROLLED SUBSTANCE (CMPD *60 cap*0        Sig: Open Tobramycin capsule and empty contents into 240           ml of nasal saline mixture. Rinse each nasal           cavity with 120 ml of mixture twice daily.  Authorizing Provider: GLENNA RAMON  Ordering User: ALEJANDRO SANTOS

## 2024-01-17 ENCOUNTER — OFFICE VISIT (OUTPATIENT)
Dept: NEUROSURGERY | Facility: CLINIC | Age: 49
End: 2024-01-17
Payer: COMMERCIAL

## 2024-01-17 ENCOUNTER — ANCILLARY PROCEDURE (OUTPATIENT)
Dept: MRI IMAGING | Facility: CLINIC | Age: 49
End: 2024-01-17
Attending: PHYSICIAN ASSISTANT
Payer: COMMERCIAL

## 2024-01-17 VITALS
HEART RATE: 71 BPM | OXYGEN SATURATION: 100 % | RESPIRATION RATE: 16 BRPM | WEIGHT: 201 LBS | SYSTOLIC BLOOD PRESSURE: 123 MMHG | DIASTOLIC BLOOD PRESSURE: 81 MMHG | HEIGHT: 71 IN | BODY MASS INDEX: 28.14 KG/M2

## 2024-01-17 DIAGNOSIS — D35.2 PITUITARY MACROADENOMA (H): Primary | ICD-10-CM

## 2024-01-17 DIAGNOSIS — D35.2 PITUITARY MACROADENOMA (H): ICD-10-CM

## 2024-01-17 PROCEDURE — 70553 MRI BRAIN STEM W/O & W/DYE: CPT | Performed by: RADIOLOGY

## 2024-01-17 PROCEDURE — A9585 GADOBUTROL INJECTION: HCPCS | Performed by: RADIOLOGY

## 2024-01-17 PROCEDURE — 99213 OFFICE O/P EST LOW 20 MIN: CPT | Performed by: PHYSICIAN ASSISTANT

## 2024-01-17 RX ORDER — GADOBUTROL 604.72 MG/ML
10 INJECTION INTRAVENOUS ONCE
Status: COMPLETED | OUTPATIENT
Start: 2024-01-17 | End: 2024-01-17

## 2024-01-17 RX ADMIN — GADOBUTROL 10 ML: 604.72 INJECTION INTRAVENOUS at 09:06

## 2024-01-17 ASSESSMENT — PAIN SCALES - GENERAL: PAINLEVEL: NO PAIN (0)

## 2024-01-17 NOTE — DISCHARGE INSTRUCTIONS
MRI Contrast Discharge Instructions    The IV contrast you received today will pass out of your body in your  urine. This will happen in the next 24 hours. You will not feel this process.  Your urine will not change color.    Drink at least 4 extra glasses of water or juice today (unless your doctor  has restricted your fluids). This reduces the stress on your kidneys.  You may take your regular medicines.    If you are on dialysis: It is best to have dialysis today.    If you have a reaction: Most reactions happen right away. If you have  any new symptoms after leaving the hospital (such as hives or swelling),  call your hospital at the correct number below. Or call your family doctor.  If you have breathing distress or wheezing, call 911.    Special instructions: ***    I have read and understand the above information.    Signature:______________________________________ Date:___________    Staff:__________________________________________ Date:___________     Time:__________    [facility] Departments:    ___[place]: [phone]  ___[facility]: [phone]  ___[place]: [phone] ___[facility]: [phone]  ___[facility]: [phone]  ___[facility]: [phone]  ___[phone]  ___[facility]: [phone]  ___[phone]

## 2024-01-17 NOTE — PROGRESS NOTES
"  [facility]  Department of Neurosurgery  [facility] Pituitary [facility]    Name: Yony Loera  MRN: [id number]  Age: 48 year old  : 2024      Chief Complaint:   Pituitary adenoma s/p endoscopic endonasal transsellar transclival approach for resection 2023 (Regino/Vic), follow up visit    History of Present Illness:   Yony Loera is a 48 year old right handed male who is here today for a 3 month postoperative visit. He presented with bitemporal hemianopsia with subjective left peripheral vision loss and central blurring, found to have a large pituitary adenoma on imaging. He underwent the above mentioned procedure. He's feeling well, is here with his wife Yuridia. He continues on Cortef 10mg/5mg along with daily levothyroxine. His wife thinks he has some residual fatigue but overall he's back to himself. His lower extremity swelling has resolved, ultrasound workup was negative for DVT.   He had an erythematous mass appear on his anterior neck, near his thyroid, was treated with Augmentin via Urgent Care visit after imaging revealed superficial mass; things have improved. He sees Dr. Ho in ENT next week for follow up and will mention this.    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     Physical Exam:   /81 (BP Location: Right arm, Patient Position: Sitting)   Pulse 71   Resp 16   Ht 1.803 m (5' 11\")   Wt 91.2 kg (201 lb)   SpO2 100%   BMI 28.03 kg/m    General: No acute distress.    Eyes: Conjunctivae are normal.  MSK: Moves all extremities.  No obvious deformity.  Neuro: The patient is fully oriented. Speech is normal. Facial nerve function is normal, rated as a House Brackmann 1. Gait is normal.  Psych: Normal mood and affect. Behavior is normal.      Imaging:  We reviewed the " pituitary MRI done today which shows postoperative changes and possible small residual adenoma in the left sphenoid, otherwise without recurrence. Radiology read is pending.      Assessment:  Pituitary adenoma s/p endoscopic endonasal transsellar transclival approach for resection 9/21/2023 (Regino/Vic), follow up visit    Plan:  We reviewed the MRI findings today which are favorable. We will keep an eye on the left sided findings. We'll plan to see him back in 1 year with repeat imaging, and he was encouraged to reach out sooner with new concerns in the meantime.       Margaux Glover PA-C  Department of Neurosurgery

## 2024-01-17 NOTE — PATIENT INSTRUCTIONS
Return in 1 year with MRI pituitary w/wo contrast prior to appointment.    Please don't hesitate to reach out sooner with new concerns.

## 2024-01-19 ENCOUNTER — TELEPHONE (OUTPATIENT)
Dept: ENDOCRINOLOGY | Facility: CLINIC | Age: 49
End: 2024-01-19
Payer: COMMERCIAL

## 2024-01-19 NOTE — TELEPHONE ENCOUNTER
Patient call:     Appointment type: Return pituitary   Provider: Alcides   Return date: 4/10   Speciality phone number: [phone]  Additional appointment(s) needed: N/A   Additional notes: Spoke to pt and scheduled next avail for a non urgent postoperative appointment. Offered March 27th availability but pt will be out of town that week. Pt took 4/10 appt.     Leah Blackwood on 1/19/2024 at 10:32 AM

## 2024-01-22 ENCOUNTER — TELEPHONE (OUTPATIENT)
Dept: NEUROSURGERY | Facility: CLINIC | Age: 49
End: 2024-01-22
Payer: COMMERCIAL

## 2024-01-22 NOTE — TELEPHONE ENCOUNTER
Left Voicemail (1st Attempt) for the patient to call back and schedule the following:    Appointment type: RTN SKULL BASE  Provider: PARISH  Return date: JANUARY 2025  Specialty phone number: [phone]  Additional appointment(s) needed: MRI  Additonal Notes: per Appt Request.

## 2024-01-22 NOTE — PATIENT INSTRUCTIONS
1. Please follow-up in clinic as needed   2. Please call the ENT clinic with any questions,concerns, new or worsening symptoms.    -Clinic number is [phone]   - Carol's direct line (Dr. Ho's nurse) [phone]

## 2024-01-25 ENCOUNTER — OFFICE VISIT (OUTPATIENT)
Dept: OTOLARYNGOLOGY | Facility: CLINIC | Age: 49
End: 2024-01-25
Payer: COMMERCIAL

## 2024-01-25 VITALS
SYSTOLIC BLOOD PRESSURE: 131 MMHG | WEIGHT: 205.7 LBS | BODY MASS INDEX: 28.8 KG/M2 | HEIGHT: 71 IN | OXYGEN SATURATION: 97 % | HEART RATE: 69 BPM | DIASTOLIC BLOOD PRESSURE: 86 MMHG

## 2024-01-25 DIAGNOSIS — J45.40 MODERATE PERSISTENT REACTIVE AIRWAY DISEASE WITHOUT COMPLICATION: Primary | ICD-10-CM

## 2024-01-25 PROCEDURE — 99214 OFFICE O/P EST MOD 30 MIN: CPT | Mod: 25 | Performed by: OTOLARYNGOLOGY

## 2024-01-25 PROCEDURE — 31237 NSL/SINS NDSC SURG BX POLYPC: CPT | Mod: LT | Performed by: OTOLARYNGOLOGY

## 2024-01-25 RX ORDER — BUDESONIDE 0.5 MG/2ML
INHALANT ORAL
Qty: 120 ML | Refills: 6 | Status: SHIPPED | OUTPATIENT
Start: 2024-01-25 | End: 2024-05-22

## 2024-01-25 ASSESSMENT — PAIN SCALES - GENERAL: PAINLEVEL: NO PAIN (0)

## 2024-01-25 NOTE — PROGRESS NOTES
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  RECONSTRUCTION WITH DURA GEN AND LEFT-SIDED NASOSEPTAL FLAP ON 9/21/2023     NEUROSURGEON: MAZIN BEE    History of Present Illness: Mr. Loera is a 48-year-old male who is back to the otolaryngology clinic for follow-up.  He is about 4 months from transnasal endoscopic approach with resection of pituitary macroadenoma.  The patient states that  few weeks ago he started developing nasal congestion and  crusting.  He states that for the last week or so he has been feeling much better.  He denies any postnasal drainage.  No symptoms of CSF leak.    MEDICATIONS:     Current Outpatient Medications   Medication Sig Dispense Refill     budesonide (PULMICORT) 0.5 MG/2ML neb solution Empty contents of ampule into 240mL of saline solution and rinse both nasal cavities as instructed twice daily. 120 mL 6     desmopressin (DDAVP) 0.01 % spray Spray 1 spray (10 mcg) in nostril at bedtime 5 mL 3     hydrocortisone (CORTEF) 5 MG tablet Take 2 tablets in the morning po and 1 tablet in the afternoon 270 tablet 3     hydrocortisone (CORTEF) 5 MG tablet Take 1 tablet (5 mg) by mouth every 24 hours 60 tablet 0     levothyroxine (SYNTHROID/LEVOTHROID) 75 MCG tablet Take 1 tablet (75 mcg) by mouth daily (Patient taking differently: Take 75 mcg by mouth every morning) 90 tablet 3     multivitamin w/minerals (MULTI-VITAMIN) tablet Take 1 tablet by mouth every evening       sodium chloride (OCEAN) 0.65 % nasal spray Please use two sprays in each nostril every two hours while awake. 88 mL 3     syringe/needle, disp, 23G X 1\" 3 ML MISC Use to inject testosterone weekly 14 each 3     testosterone cypionate (DEPOTESTOSTERONE) 200 MG/ML injection Inject 0.5 mLs (100 mg) into the muscle once a week 2 mL 5     acetaminophen " "(TYLENOL) 325 MG tablet Take 2 tablets (650 mg) by mouth every 4 hours as needed for other (For optimal non-opioid multimodal pain management to improve pain control.) (Patient not taking: Reported on 1/25/2024) 30 tablet 0     cetirizine (ZYRTEC) 10 MG tablet Take 1 tablet by mouth daily as needed (Patient not taking: Reported on 1/25/2024)       COMPOUNDED NON-CONTROLLED SUBSTANCE (CMPD RX) - PHARMACY TO MIX COMPOUNDED MEDICATION Open Tobramycin capsule and empty contents into 240 ml of nasal saline mixture. Rinse each nasal cavity with 120 ml of mixture twice daily. (Patient not taking: Reported on 1/25/2024) 60 capsule 0     dexAMETHasone (DECADRON) 4 MG/ML injection Use 4 mg or dose determined by provider for iontophoresis. (Patient not taking: Reported on 1/25/2024) 1 mL 3     diazepam (VALIUM) 2 MG tablet Wait to take medication until directed by radiology. Take 1 tablet 30 minutes prior to your MRI, may repeat the dose at time of MRI if needed. You need to have a . Please check in 1 hour prior to your MRI. (Patient not taking: Reported on 1/25/2024) 2 tablet 0     needle, disp, (B-D HYPODERMIC NEEDLE) 21G X 1\" MISC Use to withdraw medication  from vial weekly (Patient not taking: Reported on 1/25/2024) 14 each 3     oxyCODONE (ROXICODONE) 5 MG tablet Take 1 tablet (5 mg) by mouth every 4 hours as needed for moderate pain 10 tablet 0     polyethylene glycol (MIRALAX) 17 GM/Dose powder Take 17 g by mouth daily (Patient not taking: Reported on 1/25/2024) 510 g 0       ALLERGIES:  No Known Allergies    PAST MEDICAL HISTORY:   Past Medical History:   Diagnosis Date     Pituitary macroadenoma (H) 08/2023        FAMILY HISTORY:    Family History   Problem Relation Age of Onset     Anesthesia Reaction Father         PONV     Glaucoma No family hx of      Macular Degeneration No family hx of      Deep Vein Thrombosis (DVT) No family hx of        REVIEW OF SYSTEMS:  12 point ROS was negative other than the " symptoms noted above in the HPI.     Nasal endoscopy: Consent for nasal endoscopy was obtained.  I confirmed correctness of the procedure and identity of the patient.  Nasal endoscopy was indicated due to recent transnasal endoscopic approach.  The nose was topically decongested and anesthetized.  The nasal endoscope was passed under endoscopic vision.  The septum is in the midline.  On the left side the sinonasal passages are mucosalized.  Posterior septectomy with a very large crust that was removed today using suction in nasal forceps.  There is evidence of inflammatory tissue on the posterior septectomy.  The sphenoid sinus is wide open with no purulence or CSF leak.    IMPRESSION AND PLAN: 48-year-old male status post transnasal endoscopic approach to excise pituitary macroadenoma.  A today's nasal endoscopy revealed abundant sinonasal crust at the posterior septectomy.  The crust was removed today.  I am going to start the patient on budesonide nasal rinses.  And I would like to see her back as needed.  He is going to call as if he continues to have more symptoms.     Gary Tavares MD, M.S.  Otolaryngology- Head & Neck Surgery  [phone]

## 2024-01-25 NOTE — LETTER
1/25/2024       RE: Yony Loera  [id number] [facility] [place] [id number]     Dear Colleague,    Thank you for referring your patient, Yony Loera, to the [facility] [place] at [facility]. Please see a copy of my visit note below.    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  RECONSTRUCTION WITH DURA GEN AND LEFT-SIDED NASOSEPTAL FLAP ON 9/21/2023     NEUROSURGEON: MAZIN BEE    History of Present Illness: Mr. Loera is a 48-year-old male who is back to the otolaryngology clinic for follow-up.  He is about 4 months from transnasal endoscopic approach with resection of pituitary macroadenoma.  The patient states that  few weeks ago he started developing nasal congestion and  crusting.  He states that for the last week or so he has been feeling much better.  He denies any postnasal drainage.  No symptoms of CSF leak.    MEDICATIONS:     Current Outpatient Medications   Medication Sig Dispense Refill     budesonide (PULMICORT) 0.5 MG/2ML neb solution Empty contents of ampule into 240mL of saline solution and rinse both nasal cavities as instructed twice daily. 120 mL 6     desmopressin (DDAVP) 0.01 % spray Spray 1 spray (10 mcg) in nostril at bedtime 5 mL 3     hydrocortisone (CORTEF) 5 MG tablet Take 2 tablets in the morning po and 1 tablet in the afternoon 270 tablet 3     hydrocortisone (CORTEF) 5 MG tablet Take 1 tablet (5 mg) by mouth every 24 hours 60 tablet 0     levothyroxine (SYNTHROID/LEVOTHROID) 75 MCG tablet Take 1 tablet (75 mcg) by mouth daily (Patient taking differently: Take 75 mcg by mouth every morning) 90 tablet 3     multivitamin w/minerals (MULTI-VITAMIN) tablet Take 1 tablet by mouth every evening       sodium chloride (OCEAN) 0.65  "% nasal spray Please use two sprays in each nostril every two hours while awake. 88 mL 3     syringe/needle, disp, 23G X 1\" 3 ML MISC Use to inject testosterone weekly 14 each 3     testosterone cypionate (DEPOTESTOSTERONE) 200 MG/ML injection Inject 0.5 mLs (100 mg) into the muscle once a week 2 mL 5     acetaminophen (TYLENOL) 325 MG tablet Take 2 tablets (650 mg) by mouth every 4 hours as needed for other (For optimal non-opioid multimodal pain management to improve pain control.) (Patient not taking: Reported on 1/25/2024) 30 tablet 0     cetirizine (ZYRTEC) 10 MG tablet Take 1 tablet by mouth daily as needed (Patient not taking: Reported on 1/25/2024)       COMPOUNDED NON-CONTROLLED SUBSTANCE (CMPD RX) - PHARMACY TO MIX COMPOUNDED MEDICATION Open Tobramycin capsule and empty contents into 240 ml of nasal saline mixture. Rinse each nasal cavity with 120 ml of mixture twice daily. (Patient not taking: Reported on 1/25/2024) 60 capsule 0     dexAMETHasone (DECADRON) 4 MG/ML injection Use 4 mg or dose determined by provider for iontophoresis. (Patient not taking: Reported on 1/25/2024) 1 mL 3     diazepam (VALIUM) 2 MG tablet Wait to take medication until directed by radiology. Take 1 tablet 30 minutes prior to your MRI, may repeat the dose at time of MRI if needed. You need to have a . Please check in 1 hour prior to your MRI. (Patient not taking: Reported on 1/25/2024) 2 tablet 0     needle, disp, (B-D HYPODERMIC NEEDLE) 21G X 1\" MISC Use to withdraw medication  from vial weekly (Patient not taking: Reported on 1/25/2024) 14 each 3     oxyCODONE (ROXICODONE) 5 MG tablet Take 1 tablet (5 mg) by mouth every 4 hours as needed for moderate pain 10 tablet 0     polyethylene glycol (MIRALAX) 17 GM/Dose powder Take 17 g by mouth daily (Patient not taking: Reported on 1/25/2024) 510 g 0       ALLERGIES:  No Known Allergies    PAST MEDICAL HISTORY:   Past Medical History:   Diagnosis Date     Pituitary macroadenoma " (H) 08/2023        FAMILY HISTORY:    Family History   Problem Relation Age of Onset     Anesthesia Reaction Father         PONV     Glaucoma No family hx of      Macular Degeneration No family hx of      Deep Vein Thrombosis (DVT) No family hx of        REVIEW OF SYSTEMS:  12 point ROS was negative other than the symptoms noted above in the HPI.     Nasal endoscopy: Consent for nasal endoscopy was obtained.  I confirmed correctness of the procedure and identity of the patient.  Nasal endoscopy was indicated due to recent transnasal endoscopic approach.  The nose was topically decongested and anesthetized.  The nasal endoscope was passed under endoscopic vision.  The septum is in the midline.  On the left side the sinonasal passages are mucosalized.  Posterior septectomy with a very large crust that was removed today using suction in nasal forceps.  There is evidence of inflammatory tissue on the posterior septectomy.  The sphenoid sinus is wide open with no purulence or CSF leak.    IMPRESSION AND PLAN: 48-year-old male status post transnasal endoscopic approach to excise pituitary macroadenoma.  A today's nasal endoscopy revealed abundant sinonasal crust at the posterior septectomy.  The crust was removed today.  I am going to start the patient on budesonide nasal rinses.  And I would like to see her back as needed.  He is going to call as if he continues to have more symptoms.     Gary Tavares MD, M.S.  Otolaryngology- Head & Neck Surgery  [phone]        Again, thank you for allowing me to participate in the care of your patient.      Sincerely,    Gary Tavares MD

## 2024-02-05 ENCOUNTER — TELEPHONE (OUTPATIENT)
Dept: NEUROSURGERY | Facility: CLINIC | Age: 49
End: 2024-02-05
Payer: COMMERCIAL

## 2024-02-05 NOTE — TELEPHONE ENCOUNTER
Left Voicemail (2nd Attempt) for the patient to call back and schedule the following:    Appointment type: RETURN SKULL BASE NEUROSURG  Provider: Adalberto Damico PA-C  Return date: January 2025  Specialty phone number: [phone]  Additional appointment(s) needed:   -MR Pituitary w/o & w Contrast   Additonal Notes:     Mariya Block on 2/5/2024 at 10:20 AM

## 2024-03-02 DIAGNOSIS — E23.2 DIABETES INSIPIDUS (H): ICD-10-CM

## 2024-03-07 NOTE — TELEPHONE ENCOUNTER
DESMOPRESSIN 10 MCG/0.1 ML SPR   Last Written Prescription Date:  12/5/2023  Last Fill Quantity: 5,   # refills: 3  Last Office Visit : 10/25/2023  Future Office visit:  4/10/2024    Routing refill request to provider for review/approval because:  Drug not on the [facility], [facility] or [facility] refill protocol or controlled substance    April Mei RN  Central Triage Red Flags/Med Refills

## 2024-03-08 RX ORDER — DESMOPRESSIN ACETATE 0.1 MG/ML
10 SOLUTION NASAL AT BEDTIME
Qty: 10 ML | Refills: 3 | Status: SHIPPED | OUTPATIENT
Start: 2024-03-08

## 2024-03-19 ENCOUNTER — MYC MEDICAL ADVICE (OUTPATIENT)
Dept: ENDOCRINOLOGY | Facility: CLINIC | Age: 49
End: 2024-03-19
Payer: COMMERCIAL

## 2024-03-21 DIAGNOSIS — E27.40 ADRENAL INSUFFICIENCY (H): Primary | ICD-10-CM

## 2024-03-21 DIAGNOSIS — E29.1 HYPOGONADISM MALE: ICD-10-CM

## 2024-03-21 RX ORDER — TESTOSTERONE CYPIONATE 200 MG/ML
100 INJECTION, SOLUTION INTRAMUSCULAR WEEKLY
Qty: 2 ML | Refills: 5 | Status: SHIPPED | OUTPATIENT
Start: 2024-03-21 | End: 2024-06-13

## 2024-03-21 RX ORDER — HYDROCORTISONE SODIUM SUCCINATE 100 MG/2ML
INJECTION, POWDER, FOR SOLUTION INTRAMUSCULAR; INTRAVENOUS
Qty: 2 ML | Refills: 2 | Status: SHIPPED | OUTPATIENT
Start: 2024-03-21

## 2024-04-01 NOTE — PROGRESS NOTES
- Endocrinology follow up -  Reason for visit/consult:  Data Unavailable     Primary care provider: Gurmeet Castillo        Assessment and Plan  48 year old male who had pituitary macro adenoma resected by s/p TSS on 9/21/2023 by Dr. Lacy here for follow up of hormone replacement therapy.     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  - Check sodium urine lab levels      Hypogonadism  - Continue testosterone injection 100 mg weekly.   - Check testosterone blood work levels    Joint Pain  - If IGF levels return low, consider starting growth hormone replacement.       Total 30 minutes spent on the date of the encounter doing chart review, history and exam, documentation and further activities as noted above.     Austen Martinez, MS2  [facility]     Interval History as of 4/10/2024 : Patient has been doing well on current hormone replacement therapy but he started to have occasional joint pain in his knuckles and swelling of right knee concerning hormone deficiency.  HPI: A 47 yo male here for the endocrine follow up for  pituitary macroadenoma.   Referral from Dr. Lacy.   In January 2023 he went to the eye exam and noticed that he is missing letters and blurry vision therefore he was referred to retinal specialist in June 2023 and then he was referred to neuro-ophthalmologist Dr. Claire.  Brain MRI was done and found a large lobulated lesion with 5.6 x 4.3 x 4.3 cm.  He was referred to neurosurgeon Dr. Lacy and planned for the surgery.   He is endocrine hormone level was assessed on August 16, 2023 showed free T4 mildly low 0.79 with TSH 2.3, prolactin 30 and FSH 4.8, with total testosterone 192.  His IGF-I normal  range 182 and his cortisol was 9.7 and ACTH 26.  Body weight 215 pounds and height 5 feet and 11 inches.  Denied loss of energy, no headache he sleeps okay, eating okay.   Other medical history including sinusitis with occasionally taking Zyrtec . he walks as a .      Past Medical/Surgical History:       Past Medical History:   Diagnosis Date    Pituitary macroadenoma (H) 08/2023            Past Surgical History:   Procedure Laterality Date    DENTAL SURGERY         wisdom    OPTICAL TRACKING SYSTEM ENDOSCOPIC ENDONASAL SURGERY Bilateral 9/21/2023     Procedure: stealth assisted Endoscopic endonasal transsellar transclival approach for tumor, nasoseptal flap;  Surgeon: Sabas Lacy MD;  Location:  OR         Allergies:  Allergies   No Known Allergies        Current Medications   Current Facility-Administered Medications       Current Outpatient Medications   Medication    acetaminophen (TYLENOL) 325 MG tablet    cetirizine (ZYRTEC) 10 MG tablet    desmopressin (DDAVP) 0.01 % spray    dexAMETHasone (DECADRON) 4 MG/ML injection    hydrocortisone (CORTEF) 5 MG tablet    hydrocortisone (CORTEF) 5 MG tablet    levothyroxine (SYNTHROID/LEVOTHROID) 75 MCG tablet    multivitamin w/minerals (MULTI-VITAMIN) tablet    oxyCODONE (ROXICODONE) 5 MG tablet    polyethylene glycol (MIRALAX) 17 GM/Dose powder    sodium chloride (OCEAN) 0.65 % nasal spray      No current facility-administered medications for this visit.            Family History:        Family History   Problem Relation Age of Onset    Anesthesia Reaction Father           PONV    Glaucoma No family hx of      Macular Degeneration No family hx of      Deep Vein Thrombosis (DVT) No family hx of           Social History:  Social History            Tobacco Use    Smoking status: Never    Smokeless tobacco: Never   Substance Use Topics    Alcohol use: Yes       Alcohol/week: 2.0 - 3.0 standard drinks of alcohol       Types: 2 - 3  "Standard drinks or equivalent per week         ROS:  Full review of systems taken with the help of the intake sheet. Otherwise a complete 14 point review of systems was taken and is negative unless stated in the history above.        Physical Exam:   Vitals: Ht 1.803 m (5' 11\")   BMI 29.01 kg/m    BMI= Body mass index is 29.01 kg/m .   General: well appearing, no acute distress, pleasant and conversant,   Mental Status/neuro: alert and oriented  Face: symmetrical, normal facial color  Eyes: anicteric, no proptosis or lid lag  Resp: normally breathing        Labs : I reviewed data from epic and extract and summarize the pertinent data here.         Lab Results   Component Value Date      08/16/2023            Lab Results   Component Value Date     POTASSIUM 4.2 08/16/2023            Lab Results   Component Value Date     CHLORIDE 103 08/16/2023            Lab Results   Component Value Date     SOURAV 9.9 08/16/2023            Lab Results   Component Value Date     CO2 25 08/16/2023            Lab Results   Component Value Date     BUN 18.9 08/16/2023            Lab Results   Component Value Date     CR 1.07 08/16/2023            Lab Results   Component Value Date      08/16/2023            Lab Results   Component Value Date     TSH 2.39 08/16/2023            Lab Results   Component Value Date     T4 0.79 08/16/2023      No results found for: A1C     No results found for: IGF1  No results found for: LH        Lab Results   Component Value Date     FSH 4.8 08/16/2023      No results found for: ESTROGEN  No results found for: PROLACTIN           MRI Brain: I personally reviewed the original images and agree with the below reports.           Large lobulated T2 FLAIR intermediate (somewhat similar to gray matter  on T1 and T2) and heterogeneously enhancing expansile mass centered on  the nonvisualized sella measuring 5.6 x 4.3 x 4.3 cm. Areas of marked  T2 hyperintense signal may indicated cystic or necrotic " component.  This superiorly extends into suprasellar cistern and widens diaphragma  sella. There is associated diffusion restriction. Nonvisualization of  the pituitary gland and stalk. Marked mass effect upon the optic  chiasm which appears elevated.  This inferiorly this extends into the nasopharynx and involves the  majority of clivus stopping at the junction of the sphenoid bone with  the anterior occipital bone.  This anteriorly extends through the  sphenoid locules and into the posterior ethmoidal cells. Posteriorly  this extends into the prepontine cistern which is partially effaced.  There is invasion of the bilateral cavernous sinuses (best seen on  image 7 of series 11). No evidence of encasement of either cavernous  segment of the bilateral internal carotid arteries.     Regarding the orbits, no definite mass is noted of the globe, conal  structures, or within the orbital fat on either side. The optic nerves

## 2024-04-10 ENCOUNTER — LAB (OUTPATIENT)
Dept: LAB | Facility: CLINIC | Age: 49
End: 2024-04-10
Payer: COMMERCIAL

## 2024-04-10 ENCOUNTER — OFFICE VISIT (OUTPATIENT)
Dept: ENDOCRINOLOGY | Facility: CLINIC | Age: 49
End: 2024-04-10
Payer: COMMERCIAL

## 2024-04-10 VITALS
HEIGHT: 72 IN | BODY MASS INDEX: 27.09 KG/M2 | SYSTOLIC BLOOD PRESSURE: 103 MMHG | DIASTOLIC BLOOD PRESSURE: 82 MMHG | WEIGHT: 200 LBS | HEART RATE: 64 BPM | OXYGEN SATURATION: 100 %

## 2024-04-10 DIAGNOSIS — E23.0 PANHYPOPITUITARISM (H): Primary | ICD-10-CM

## 2024-04-10 DIAGNOSIS — E23.0 PANHYPOPITUITARISM (H): ICD-10-CM

## 2024-04-10 DIAGNOSIS — E23.2 DIABETES INSIPIDUS (H): ICD-10-CM

## 2024-04-10 LAB
ANION GAP SERPL CALCULATED.3IONS-SCNC: 9 MMOL/L (ref 7–15)
BUN SERPL-MCNC: 14.2 MG/DL (ref 6–20)
CALCIUM SERPL-MCNC: 9.3 MG/DL (ref 8.6–10)
CHLORIDE SERPL-SCNC: 106 MMOL/L (ref 98–107)
CREAT SERPL-MCNC: 0.97 MG/DL (ref 0.67–1.17)
DEPRECATED HCO3 PLAS-SCNC: 25 MMOL/L (ref 22–29)
EGFRCR SERPLBLD CKD-EPI 2021: >90 ML/MIN/1.73M2
GLUCOSE SERPL-MCNC: 95 MG/DL (ref 70–99)
POTASSIUM SERPL-SCNC: 4.4 MMOL/L (ref 3.4–5.3)
SODIUM SERPL-SCNC: 140 MMOL/L (ref 135–145)
SP GR UR STRIP: 1.02 (ref 1–1.03)
T4 FREE SERPL-MCNC: 1.14 NG/DL (ref 0.9–1.7)
TSH SERPL DL<=0.005 MIU/L-ACNC: 0.03 UIU/ML (ref 0.3–4.2)

## 2024-04-10 PROCEDURE — 99214 OFFICE O/P EST MOD 30 MIN: CPT | Performed by: INTERNAL MEDICINE

## 2024-04-10 PROCEDURE — 81003 URINALYSIS AUTO W/O SCOPE: CPT | Performed by: PATHOLOGY

## 2024-04-10 PROCEDURE — 99000 SPECIMEN HANDLING OFFICE-LAB: CPT | Performed by: PATHOLOGY

## 2024-04-10 PROCEDURE — 36415 COLL VENOUS BLD VENIPUNCTURE: CPT | Performed by: PATHOLOGY

## 2024-04-10 PROCEDURE — 80048 BASIC METABOLIC PNL TOTAL CA: CPT | Performed by: PATHOLOGY

## 2024-04-10 PROCEDURE — 84305 ASSAY OF SOMATOMEDIN: CPT | Performed by: INTERNAL MEDICINE

## 2024-04-10 PROCEDURE — 84443 ASSAY THYROID STIM HORMONE: CPT | Performed by: PATHOLOGY

## 2024-04-10 PROCEDURE — 84439 ASSAY OF FREE THYROXINE: CPT | Performed by: PATHOLOGY

## 2024-04-10 PROCEDURE — 84403 ASSAY OF TOTAL TESTOSTERONE: CPT | Performed by: INTERNAL MEDICINE

## 2024-04-10 ASSESSMENT — PAIN SCALES - GENERAL: PAINLEVEL: NO PAIN (0)

## 2024-04-10 NOTE — PROGRESS NOTES
- Endocrinology follow up -  Reason for visit/consult:  Data Unavailable     Primary care provider: Gurmeet Castillo        Assessment and Plan  48 year old male who had pituitary macro adenoma resected by s/p TSS on 9/21/2023 by Dr. Lacy here for follow up of hormone replacement therapy.     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  - Check sodium urine lab levels      Hypogonadism  - Continue testosterone injection 100 mg weekly.   - Check testosterone blood work levels     Joint Pain  - If IGF levels return low, consider starting growth hormone replacement.          I saw the patient today with Dr. Mcgrath and discussed the above plan.  Austen Martinez, MS2  [facility]  Medical school    Attending tie-in note  I saw the patient with Juan MS2 and directly examined patient and discussed. Agree above note and plan.     Stable panhypopituitarism with DI    30 minutes spent on the date of the encounter doing chart review, history and exam, documentation and further activities as noted above.    Delilah Mcgrath MD  Staff Physician  Endocrinology and Metabolism  [facility]  License: [id number]  Pager: [phone]      Interval History as of 4/10/2024 : Patient has been doing well on current hormone replacement therapy but he started to have occasional joint pain in his knuckles and swelling of right knee concerning hormone deficiency.  HPI: A 47 yo male here for the endocrine follow up for  pituitary macroadenoma.   Referral from Dr. Lacy.   In January 2023 he went to the eye exam and noticed that he is missing letters and blurry vision therefore he was referred to retinal specialist in June 2023 and then he  was referred to neuro-ophthalmologist Dr. Claire.  Brain MRI was done and found a large lobulated lesion with 5.6 x 4.3 x 4.3 cm.  He was referred to neurosurgeon Dr. Lacy and planned for the surgery.   He is endocrine hormone level was assessed on August 16, 2023 showed free T4 mildly low 0.79 with TSH 2.3, prolactin 30 and FSH 4.8, with total testosterone 192.  His IGF-I normal range 182 and his cortisol was 9.7 and ACTH 26.  Body weight 215 pounds and height 5 feet and 11 inches.  Denied loss of energy, no headache he sleeps okay, eating okay.   Other medical history including sinusitis with occasionally taking Zyrtec . he walks as a .      Past Medical/Surgical History:          Past Medical History:   Diagnosis Date    Pituitary macroadenoma (H) 08/2023                Past Surgical History:   Procedure Laterality Date    DENTAL SURGERY         [employer]    OPTICAL TRACKING SYSTEM ENDOSCOPIC ENDONASAL SURGERY Bilateral 9/21/2023     Procedure: stealth assisted Endoscopic endonasal transsellar transclival approach for tumor, nasoseptal flap;  Surgeon: Sabas Lacy MD;  Location:  OR         Allergies:  Allergies   No Known Allergies         Current Medications   Current Facility-Administered Medications         Current Outpatient Medications   Medication    acetaminophen (TYLENOL) 325 MG tablet    cetirizine (ZYRTEC) 10 MG tablet    desmopressin (DDAVP) 0.01 % spray    dexAMETHasone (DECADRON) 4 MG/ML injection    hydrocortisone (CORTEF) 5 MG tablet    hydrocortisone (CORTEF) 5 MG tablet    levothyroxine (SYNTHROID/LEVOTHROID) 75 MCG tablet    multivitamin w/minerals (MULTI-VITAMIN) tablet    oxyCODONE (ROXICODONE) 5 MG tablet    polyethylene glycol (MIRALAX) 17 GM/Dose powder    sodium chloride (OCEAN) 0.65 % nasal spray      No current facility-administered medications for this visit.            Family History:            Family History   Problem Relation Age of Onset     "Anesthesia Reaction Father           PONV    Glaucoma No family hx of      Macular Degeneration No family hx of      Deep Vein Thrombosis (DVT) No family hx of           Social History:  Social History                Tobacco Use    Smoking status: Never    Smokeless tobacco: Never   Substance Use Topics    Alcohol use: Yes       Alcohol/week: 2.0 - 3.0 standard drinks of alcohol       Types: 2 - 3 Standard drinks or equivalent per week         ROS:  Full review of systems taken with the help of the intake sheet. Otherwise a complete 14 point review of systems was taken and is negative unless stated in the history above.        Physical Exam:   Vitals: Ht 1.803 m (5' 11\")   BMI 29.01 kg/m    BMI= Body mass index is 29.01 kg/m .   General: well appearing, no acute distress, pleasant and conversant,   Mental Status/neuro: alert and oriented  Face: symmetrical, normal facial color  Eyes: anicteric, no proptosis or lid lag  Resp: normally breathing        Labs : I reviewed data from epic and extract and summarize the pertinent data here.             Lab Results   Component Value Date      08/16/2023                Lab Results   Component Value Date     POTASSIUM 4.2 08/16/2023                Lab Results   Component Value Date     CHLORIDE 103 08/16/2023                Lab Results   Component Value Date     SOURAV 9.9 08/16/2023                Lab Results   Component Value Date     CO2 25 08/16/2023                Lab Results   Component Value Date     BUN 18.9 08/16/2023                Lab Results   Component Value Date     CR 1.07 08/16/2023                Lab Results   Component Value Date      08/16/2023                Lab Results   Component Value Date     TSH 2.39 08/16/2023                Lab Results   Component Value Date     T4 0.79 08/16/2023      No results found for: A1C     No results found for: IGF1  No results found for: LH            Lab Results   Component Value Date     FSH 4.8 08/16/2023    "   No results found for: ESTROGEN  No results found for: PROLACTIN           MRI Brain: I personally reviewed the original images and agree with the below reports.           Large lobulated T2 FLAIR intermediate (somewhat similar to gray matter  on T1 and T2) and heterogeneously enhancing expansile mass centered on  the nonvisualized sella measuring 5.6 x 4.3 x 4.3 cm. Areas of marked  T2 hyperintense signal may indicated cystic or necrotic component.  This superiorly extends into suprasellar cistern and widens diaphragma  sella. There is associated diffusion restriction. Nonvisualization of  the pituitary gland and stalk. Marked mass effect upon the optic  chiasm which appears elevated.  This inferiorly this extends into the nasopharynx and involves the  majority of clivus stopping at the junction of the sphenoid bone with  the anterior occipital bone.  This anteriorly extends through the  sphenoid locules and into the posterior ethmoidal cells. Posteriorly  this extends into the prepontine cistern which is partially effaced.  There is invasion of the bilateral cavernous sinuses (best seen on  image 7 of series 11). No evidence of encasement of either cavernous  segment of the bilateral internal carotid arteries.     Regarding the orbits, no definite mass is noted of the globe, conal  structures, or within the orbital fat on either side. The optic nerves

## 2024-04-10 NOTE — NURSING NOTE
Chief Complaint   Patient presents with    Follow Up     Vital signs:      BP: 103/82 Pulse: 64     SpO2: 100 %     Height: 182.9 cm (6') Weight: 90.7 kg (200 lb)  Estimated body mass index is 27.12 kg/m  as calculated from the following:    Height as of this encounter: 1.829 m (6').    Weight as of this encounter: 90.7 kg (200 lb).

## 2024-04-11 LAB — TESTOST SERPL-MCNC: 97 NG/DL (ref 240–950)

## 2024-04-18 LAB
INSULIN GROWTH FACTOR 1 (EXTERNAL): 37 NG/ML (ref 50–317)
INSULIN GROWTH FACTOR I SD SCORE (EXTERNAL): -2.5 SD

## 2024-05-20 DIAGNOSIS — J45.40 MODERATE PERSISTENT REACTIVE AIRWAY DISEASE WITHOUT COMPLICATION: ICD-10-CM

## 2024-05-22 RX ORDER — BUDESONIDE 0.5 MG/2ML
INHALANT ORAL
Qty: 360 ML | Refills: 3 | Status: SHIPPED | OUTPATIENT
Start: 2024-05-22

## 2024-05-22 NOTE — TELEPHONE ENCOUNTER
LVD:  1/25/2024  [facility]     Gary Tavares MD  Otolaryngology     Refilled per protocol.  Remaining refills sent to requested pharmacy.

## 2024-06-13 ENCOUNTER — MYC REFILL (OUTPATIENT)
Dept: ENDOCRINOLOGY | Facility: CLINIC | Age: 49
End: 2024-06-13
Payer: COMMERCIAL

## 2024-06-13 DIAGNOSIS — E29.1 HYPOGONADISM MALE: ICD-10-CM

## 2024-06-13 DIAGNOSIS — E27.40 ADRENAL INSUFFICIENCY (H): ICD-10-CM

## 2024-06-14 RX ORDER — TESTOSTERONE CYPIONATE 200 MG/ML
100 INJECTION, SOLUTION INTRAMUSCULAR WEEKLY
Qty: 2 ML | Refills: 5 | Status: SHIPPED | OUTPATIENT
Start: 2024-06-14

## 2024-09-05 DIAGNOSIS — D35.2 PITUITARY MACROADENOMA (H): ICD-10-CM

## 2024-09-05 DIAGNOSIS — D35.2 PITUITARY ADENOMA WITH EXTRASELLAR EXTENSION (H): ICD-10-CM

## 2024-09-09 ENCOUNTER — MYC MEDICAL ADVICE (OUTPATIENT)
Dept: ENDOCRINOLOGY | Facility: CLINIC | Age: 49
End: 2024-09-09
Payer: COMMERCIAL

## 2024-09-09 DIAGNOSIS — D35.2 PITUITARY ADENOMA WITH EXTRASELLAR EXTENSION (H): ICD-10-CM

## 2024-09-09 RX ORDER — LEVOTHYROXINE SODIUM 75 UG/1
75 TABLET ORAL DAILY
Qty: 90 TABLET | Refills: 3 | Status: SHIPPED | OUTPATIENT
Start: 2024-09-09

## 2024-09-09 RX ORDER — LEVOTHYROXINE SODIUM 75 UG/1
75 TABLET ORAL DAILY
Qty: 90 TABLET | Refills: 3 | OUTPATIENT
Start: 2024-09-09

## 2024-09-09 NOTE — TELEPHONE ENCOUNTER
hydrocortisone (CORTEF) 5 MG tablet       Last Written Prescription Date:  11/10/23  Last Fill Quantity: 270,   # refills: 3  Last Office Visit : 4/10/24  Future Office visit:  4/23/25    Routing refill request to provider for review/approval because:  Needs provider authorization

## 2024-09-11 RX ORDER — HYDROCORTISONE 5 MG/1
TABLET ORAL
Qty: 270 TABLET | Refills: 3 | Status: SHIPPED | OUTPATIENT
Start: 2024-09-11

## 2024-10-12 ENCOUNTER — HEALTH MAINTENANCE LETTER (OUTPATIENT)
Age: 49
End: 2024-10-12

## 2025-01-09 ENCOUNTER — MYC MEDICAL ADVICE (OUTPATIENT)
Dept: NEUROSURGERY | Facility: CLINIC | Age: 50
End: 2025-01-09
Payer: COMMERCIAL

## 2025-01-09 DIAGNOSIS — E29.1 TESTICULAR HYPOFUNCTION: Primary | ICD-10-CM

## 2025-01-15 RX ORDER — NEEDLES, SAFETY 22GX1 1/2"
1 NEEDLE, DISPOSABLE MISCELLANEOUS
Qty: 12 EACH | Refills: 4 | Status: SHIPPED | OUTPATIENT
Start: 2025-01-15

## 2025-01-23 DIAGNOSIS — E29.1 HYPOGONADISM MALE: Primary | ICD-10-CM

## 2025-01-23 RX ORDER — NEEDLES, DISPOSABLE 25GX5/8"
NEEDLE, DISPOSABLE MISCELLANEOUS
Qty: 12 EACH | Refills: 3 | Status: SHIPPED | OUTPATIENT
Start: 2025-01-23

## 2025-02-22 DIAGNOSIS — E29.1 HYPOGONADISM MALE: ICD-10-CM

## 2025-02-23 NOTE — TELEPHONE ENCOUNTER
"testosterone cypionate (DEPOTESTOSTERONE) 200 MG/ML injection 2 mL 5 6/14/2024 -- No   Sig - Route: Inject 0.5 mLs (100 mg) into the muscle once a week - Intramuscular       Delilah Mcgrath MD  Endocrinology, Diabetes, and Metabolism  Lv 4/10/24  csc  Nv   4/23/25    [x]  Refill decision: Medication unable to be refilled by RN due to: Controlled medication      Request from pharmacy:  Requested Prescriptions   Pending Prescriptions Disp Refills    testosterone cypionate (DEPOTESTOSTERONE) 200 MG/ML injection [Pharmacy Med Name: TESTOSTERONE  MG/ML] 2 mL      Sig: INJECT 0.5 MLS (100 MG) INTO THE MUSCLE ONCE A WEEK. *SINGLE USE VIALS*       Androgen Agents Failed - 2/23/2025 10:27 AM        Failed - Lipid panel on file in past 12 mos     No lab results found.            Failed - ALT on file within past 12 mos     No lab results found.          Failed - Medication is active on med list and the sig matches. RN to manually verify dose and sig if red X/fail.     If the protocol passes (green check), you do not need to verify med dose and sig.    A prescription matches if they are the same clinical intention.    For Example: once daily and every morning are the same.    For all fails (red x), verify dose and sig.    If the refill does match what is on file, the RN can still proceed to approve the refill request.     If they do not match, route to the appropriate provider.             Failed - HCT less than 54% on file within past 12 mos     Recent Labs   Lab Test 09/21/23  1849   HCT 31.9*             Failed - Serum PSA on file within past 12 mos     No results found for: \"PSA\"          Failed - Refills for this classification require provider review        Failed - Most recent blood pressure under 140/90 in past 6 months     BP Readings from Last 3 Encounters:   04/10/24 103/82   01/25/24 131/86   01/17/24 123/81       No data recorded            Failed - Recent (6 mo) or future (90 days) visit within the " "authorizing provider's specialty     Patient had office visit in the last 6 months or has a visit in the next 30 days with authorizing provider or within the authorizing provider's specialty.  See \"Patient Info\" tab in inbasket, or \"Choose Columns\" in Meds & Orders section of the refill encounter.            Failed - AST on file within past 12 mos     No lab results found.          Passed - Patient is of age 12 and older        Passed - Serum Testosterone on file within past 12 mos     Recent Labs   Lab Test 04/10/24  0851   TESTOSTTOTAL 97*             Passed - Patient is not pregnant        Passed - No positive pregnancy test on file within past 12 mos             "

## 2025-02-24 RX ORDER — TESTOSTERONE CYPIONATE 200 MG/ML
INJECTION, SOLUTION INTRAMUSCULAR
Qty: 2 ML | Refills: 5 | Status: SHIPPED | OUTPATIENT
Start: 2025-02-24

## 2025-03-08 DIAGNOSIS — E23.2 DIABETES INSIPIDUS: ICD-10-CM

## 2025-03-13 RX ORDER — DESMOPRESSIN ACETATE 0.1 MG/ML
10 SOLUTION NASAL AT BEDTIME
Qty: 10 ML | Refills: 3 | Status: SHIPPED | OUTPATIENT
Start: 2025-03-13

## 2025-03-13 NOTE — TELEPHONE ENCOUNTER
DESMOPRESSIN 10 MCG/0.1 ML SPR   Last Written Prescription:  3/8/24  10ml, 3 refills  ----------------------  Last Visit Date: 4/10/24  Future Visit Date: 11/23/25  ----------------------  Refill decision: Medication unable to be refilled by RN due to: Medication not included in refill protocol policy         Request from pharmacy:  Requested Prescriptions   Pending Prescriptions Disp Refills    desmopressin (DDAVP) 0.01 % spray [Pharmacy Med Name: DESMOPRESSIN 10 MCG/0.1 ML SPR] 10 mL 3     Sig: SPRAY 1 SPRAY (10 MCG) IN NOSTRIL AT BEDTIME       There is no refill protocol information for this order

## 2025-04-07 NOTE — PROGRESS NOTES
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 Lacy here for follow up of hormone replacement therapy.     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   - We consulted Dr. Lacy in neurosurgery. He may consider a second elective surgery to remove growing residual tumor. Team will meet with the patient to discuss soon      #Hypopituitarism  - Today his free T4 is 0.97 (normal). Continue 75 mcg levothyroxine daily.   - Continue Hydrocortisone 10/5 mg daily      # Diabetes insipidus  - Continue on DDVAP 0.01%, 1 spray at bedtime.     #Hypogonadism  - Continue testosterone injection 100 mg weekly.   - Check testosterone blood levels today.      #Joint Pain  - Rechecking IGF-1 levels today.  - We will consult neurosurgery and consider GH supplement.     Return to clinic with me in 1 year.      Patient was seen and plan of care discussed with Dr. Mcgrath.      Pauline Sun, MS3      Attending tie-in note   I saw the patient with Pauline Sun MS3  and directly examined patient and discussed. Agree above note and plan.    30 minutes spent on the date of the encounter doing chart review, history and exam, documentation and further activities as noted above.    The longitudinal plan of care for the diagnosis(es)/condition(s) as documented were addressed during this visit. Due to the added complexity in care, I will continue to support Yony in the subsequent management and with ongoing continuity of care.     Attending tie-in note  I saw the patient with Sun MS3 and directly examined  patient and discussed. Agree above note and plan.       Delilah Mcgrath MD  Staff Physician  Endocrinology and Metabolism  [facility]  License: [place] [id number]  Pager: [phone]   Delilah Mcgrath MD  Staff Physician  Endocrinology and Metabolism  [facility]  License: [place] [address]  Pager: [phone]     Interval History as of 4/23/2025 : Patient is doing well. Tolerating hormone replacement therapy well without any major side effects. Compliance to levothyroxine, hydrocortisone, DDVAP, and testosterone great. Will return to 10/5 mg daily hydrocortisone today after tapering to 0/10 mg daily. Reports intermittent joint pain in his hands and knees and episodic sensation loss in his fingers and toes. We discussed potential symptoms of growth hormone deficiency and will measure IGF-1 today.   Interval History as of 4/10/2024 : Patient has been doing well on current hormone replacement therapy but he started to have occasional joint pain in his knuckles and swelling of right knee concerning hormone deficiency.  HPI: A 47 yo male here for the endocrine follow up for  pituitary macroadenoma.   Referral from Dr. Lacy.   In January 2023 he went to the eye exam and noticed that he is missing letters and blurry vision therefore he was referred to retinal specialist in June 2023 and then he was referred to neuro-ophthalmologist Dr. Claire.  Brain MRI was done and found a large lobulated lesion with 5.6 x 4.3 x 4.3 cm.  He was referred to neurosurgeon Dr. Lacy and planned for the surgery.   He is endocrine hormone level was assessed on August 16, 2023 showed free T4 mildly low 0.79 with TSH 2.3, prolactin 30 and FSH 4.8, with total testosterone 192.  His IGF-I normal range 182 and his cortisol was 9.7 and ACTH 26.  Body weight 215 pounds and height 5 feet and 11 inches.  Denied loss of energy, no headache he sleeps okay, eating okay.   Other medical history including sinusitis with occasionally  taking Zyrtec . he walks as a .           Past Medical/Surgical History:          Past Medical History:   Diagnosis Date    Pituitary macroadenoma (H) 08/2023                Past Surgical History:   Procedure Laterality Date    DENTAL SURGERY         [employer]dom    OPTICAL TRACKING SYSTEM ENDOSCOPIC ENDONASAL SURGERY Bilateral 9/21/2023     Procedure: stealth assisted Endoscopic endonasal transsellar transclival approach for tumor, nasoseptal flap;  Surgeon: Sabas Lacy MD;  Location:  OR         Allergies:  Allergies   No Known Allergies         Current Medications   Current Facility-Administered Medications         Current Outpatient Medications   Medication    acetaminophen (TYLENOL) 325 MG tablet    cetirizine (ZYRTEC) 10 MG tablet    desmopressin (DDAVP) 0.01 % spray    dexAMETHasone (DECADRON) 4 MG/ML injection    hydrocortisone (CORTEF) 5 MG tablet    hydrocortisone (CORTEF) 5 MG tablet    levothyroxine (SYNTHROID/LEVOTHROID) 75 MCG tablet    multivitamin w/minerals (MULTI-VITAMIN) tablet    oxyCODONE (ROXICODONE) 5 MG tablet    polyethylene glycol (MIRALAX) 17 GM/Dose powder    sodium chloride (OCEAN) 0.65 % nasal spray      No current facility-administered medications for this visit.            Family History:            Family History   Problem Relation Age of Onset    Anesthesia Reaction Father           PONV    Glaucoma No family hx of      Macular Degeneration No family hx of      Deep Vein Thrombosis (DVT) No family hx of           Social History:  Social History                Tobacco Use    Smoking status: Never    Smokeless tobacco: Never   Substance Use Topics    Alcohol use: Yes       Alcohol/week: 2.0 - 3.0 standard drinks of alcohol       Types: 2 - 3 Standard drinks or equivalent per week         ROS:  Full review of systems taken with the help of the intake sheet. Otherwise a complete 14 point review of systems was taken and is negative unless stated in the  "history above.        Physical Exam:   Vitals: Ht 1.803 m (5' 11\")   BMI 29.01 kg/m    BMI= Body mass index is 29.01 kg/m .   General: well appearing, no acute distress, pleasant and conversant,   Mental Status/neuro: alert and oriented  Face: symmetrical, normal facial color  Eyes: anicteric, no proptosis or lid lag  Resp: normally breathing        Labs : I reviewed data from epic and extract and summarize the pertinent data here.             Lab Results   Component Value Date      08/16/2023                Lab Results   Component Value Date     POTASSIUM 4.2 08/16/2023                Lab Results   Component Value Date     CHLORIDE 103 08/16/2023                Lab Results   Component Value Date     SOURAV 9.9 08/16/2023                Lab Results   Component Value Date     CO2 25 08/16/2023                Lab Results   Component Value Date     BUN 18.9 08/16/2023                Lab Results   Component Value Date     CR 1.07 08/16/2023                Lab Results   Component Value Date      08/16/2023                Lab Results   Component Value Date     TSH 2.39 08/16/2023                Lab Results   Component Value Date     T4 0.79 08/16/2023      No results found for: A1C     No results found for: IGF1  No results found for: LH            Lab Results   Component Value Date     FSH 4.8 08/16/2023      No results found for: ESTROGEN  No results found for: PROLACTIN           MRI Brain: I personally reviewed the original images and agree with the below reports.           Large lobulated T2 FLAIR intermediate (somewhat similar to gray matter  on T1 and T2) and heterogeneously enhancing expansile mass centered on  the nonvisualized sella measuring 5.6 x 4.3 x 4.3 cm. Areas of marked  T2 hyperintense signal may indicated cystic or necrotic component.  This superiorly extends into suprasellar cistern and widens diaphragma  sella. There is associated diffusion restriction. Nonvisualization of  the pituitary " gland and stalk. Marked mass effect upon the optic  chiasm which appears elevated.  This inferiorly this extends into the nasopharynx and involves the  majority of clivus stopping at the junction of the sphenoid bone with  the anterior occipital bone.  This anteriorly extends through the  sphenoid locules and into the posterior ethmoidal cells. Posteriorly  this extends into the prepontine cistern which is partially effaced.  There is invasion of the bilateral cavernous sinuses (best seen on  image 7 of series 11). No evidence of encasement of either cavernous  segment of the bilateral internal carotid arteries.     Regarding the orbits, no definite mass is noted of the globe, conal  structures, or within the orbital fat on either side. The optic nerves   04/07/25 11:11 AM  PATIENT LAB/IMAGING STATUS : Orders completed / No further action needed

## 2025-04-18 ENCOUNTER — ANCILLARY PROCEDURE (OUTPATIENT)
Dept: MRI IMAGING | Facility: CLINIC | Age: 50
End: 2025-04-18
Attending: PHYSICIAN ASSISTANT
Payer: COMMERCIAL

## 2025-04-18 DIAGNOSIS — D35.2 PITUITARY MACROADENOMA (H): ICD-10-CM

## 2025-04-18 PROCEDURE — 70553 MRI BRAIN STEM W/O & W/DYE: CPT | Performed by: RADIOLOGY

## 2025-04-18 PROCEDURE — A9585 GADOBUTROL INJECTION: HCPCS | Performed by: RADIOLOGY

## 2025-04-18 RX ORDER — GADOBUTROL 604.72 MG/ML
10 INJECTION INTRAVENOUS ONCE
Status: COMPLETED | OUTPATIENT
Start: 2025-04-18 | End: 2025-04-18

## 2025-04-18 RX ADMIN — GADOBUTROL 9 ML: 604.72 INJECTION INTRAVENOUS at 09:45

## 2025-04-23 ENCOUNTER — TELEPHONE (OUTPATIENT)
Dept: NEUROSURGERY | Facility: CLINIC | Age: 50
End: 2025-04-23

## 2025-04-23 ENCOUNTER — OFFICE VISIT (OUTPATIENT)
Dept: ENDOCRINOLOGY | Facility: CLINIC | Age: 50
End: 2025-04-23
Payer: COMMERCIAL

## 2025-04-23 ENCOUNTER — LAB (OUTPATIENT)
Dept: LAB | Facility: CLINIC | Age: 50
End: 2025-04-23
Payer: COMMERCIAL

## 2025-04-23 VITALS
WEIGHT: 203 LBS | OXYGEN SATURATION: 94 % | DIASTOLIC BLOOD PRESSURE: 87 MMHG | HEART RATE: 73 BPM | HEIGHT: 72 IN | BODY MASS INDEX: 27.5 KG/M2 | SYSTOLIC BLOOD PRESSURE: 134 MMHG

## 2025-04-23 DIAGNOSIS — E29.1 HYPOGONADISM MALE: ICD-10-CM

## 2025-04-23 DIAGNOSIS — E29.1 HYPOGONADISM MALE: Primary | ICD-10-CM

## 2025-04-23 DIAGNOSIS — E23.2 DIABETES INSIPIDUS: ICD-10-CM

## 2025-04-23 DIAGNOSIS — E23.0 HYPOPITUITARISM: ICD-10-CM

## 2025-04-23 LAB
ANION GAP SERPL CALCULATED.3IONS-SCNC: 6 MMOL/L (ref 7–15)
BUN SERPL-MCNC: 15.2 MG/DL (ref 6–20)
CALCIUM SERPL-MCNC: 9.3 MG/DL (ref 8.8–10.4)
CHLORIDE SERPL-SCNC: 105 MMOL/L (ref 98–107)
CREAT SERPL-MCNC: 1.07 MG/DL (ref 0.67–1.17)
EGFRCR SERPLBLD CKD-EPI 2021: 84 ML/MIN/1.73M2
GLUCOSE SERPL-MCNC: 94 MG/DL (ref 70–99)
HCO3 SERPL-SCNC: 27 MMOL/L (ref 22–29)
POTASSIUM SERPL-SCNC: 4.3 MMOL/L (ref 3.4–5.3)
SODIUM SERPL-SCNC: 138 MMOL/L (ref 135–145)
T4 FREE SERPL-MCNC: 0.97 NG/DL (ref 0.9–1.7)
TSH SERPL DL<=0.005 MIU/L-ACNC: 0.06 UIU/ML (ref 0.3–4.2)

## 2025-04-23 PROCEDURE — 84305 ASSAY OF SOMATOMEDIN: CPT | Performed by: INTERNAL MEDICINE

## 2025-04-23 PROCEDURE — 84403 ASSAY OF TOTAL TESTOSTERONE: CPT | Performed by: INTERNAL MEDICINE

## 2025-04-23 PROCEDURE — 84443 ASSAY THYROID STIM HORMONE: CPT | Performed by: PATHOLOGY

## 2025-04-23 PROCEDURE — 80048 BASIC METABOLIC PNL TOTAL CA: CPT | Performed by: PATHOLOGY

## 2025-04-23 PROCEDURE — 99000 SPECIMEN HANDLING OFFICE-LAB: CPT | Performed by: PATHOLOGY

## 2025-04-23 PROCEDURE — 84439 ASSAY OF FREE THYROXINE: CPT | Performed by: PATHOLOGY

## 2025-04-23 PROCEDURE — 36415 COLL VENOUS BLD VENIPUNCTURE: CPT | Performed by: PATHOLOGY

## 2025-04-23 ASSESSMENT — PAIN SCALES - GENERAL: PAINLEVEL_OUTOF10: NO PAIN (0)

## 2025-04-23 NOTE — LETTER
4/23/2025       RE: Yony Loera  [address] [place] [address]     Dear Colleague,    Thank you for referring your patient, Yony Loera, to the [facility] ENDOCRINOLOGY [facility]INIC [place] at [facility]. Please see a copy of my visit note below.                                                                     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 Lacy here for follow up of hormone replacement therapy.     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   - We consulted Dr. Lacy in neurosurgery. He may consider a second elective surgery to remove growing residual tumor. Team will meet with the patient to discuss soon      #Hypopituitarism  - Today his free T4 is 0.97 (normal). Continue 75 mcg levothyroxine daily.   - Continue Hydrocortisone 10/5 mg daily      # Diabetes insipidus  - Continue on DDVAP 0.01%, 1 spray at bedtime.     #Hypogonadism  - Continue testosterone injection 100 mg weekly.   - Check testosterone blood levels today.      #Joint Pain  - Rechecking IGF-1 levels today.  - We will consult neurosurgery and consider GH supplement.     Return to clinic with me in 1 year.      Patient was seen and plan of care discussed with Dr. Mcgrath.      Pauline Sun MS3      Attending tie-in note   I saw the patient with Pauline Sun MS3  and directly examined patient and discussed. Agree above note and plan.    30 minutes spent on the date of the encounter doing chart review, history and exam, documentation and further activities as noted above.    The  longitudinal plan of care for the diagnosis(es)/condition(s) as documented were addressed during this visit. Due to the added complexity in care, I will continue to support Yony in the subsequent management and with ongoing continuity of care.     Attending tie-in note  I saw the patient with Dino MS3 and directly examined patient and discussed. Agree above note and plan.       Delilah Mcgrath MD  Staff Physician  Endocrinology and Metabolism  [facility]  License: [place] [address]  Pager: [phone]   Delilah Mcgrath MD  Staff Physician  Endocrinology and Metabolism  [facility]  License: [place] [address]  Pager: [phone]     Interval History as of 4/23/2025 : Patient is doing well. Tolerating hormone replacement therapy well without any major side effects. Compliance to levothyroxine, hydrocortisone, DDVAP, and testosterone great. Will return to 10/5 mg daily hydrocortisone today after tapering to 0/10 mg daily. Reports intermittent joint pain in his hands and knees and episodic sensation loss in his fingers and toes. We discussed potential symptoms of growth hormone deficiency and will measure IGF-1 today.   Interval History as of 4/10/2024 : Patient has been doing well on current hormone replacement therapy but he started to have occasional joint pain in his knuckles and swelling of right knee concerning hormone deficiency.  HPI: A 49 yo male here for the endocrine follow up for  pituitary macroadenoma.   Referral from Dr. Lacy.   In January 2023 he went to the eye exam and noticed that he is missing letters and blurry vision therefore he was referred to retinal specialist in June 2023 and then he was referred to neuro-ophthalmologist Dr. Claire.  Brain MRI was done and found a large lobulated lesion with 5.6 x 4.3 x 4.3 cm.  He was referred to neurosurgeon Dr. Lacy and planned for the surgery.   He is endocrine hormone level was assessed on August 16, 2023 showed free T4 mildly  low 0.79 with TSH 2.3, prolactin 30 and FSH 4.8, with total testosterone 192.  His IGF-I normal range 182 and his cortisol was 9.7 and ACTH 26.  Body weight 215 pounds and height 5 feet and 11 inches.  Denied loss of energy, no headache he sleeps okay, eating okay.   Other medical history including sinusitis with occasionally taking Zyrtec . he walks as a .           Past Medical/Surgical History:          Past Medical History:   Diagnosis Date     Pituitary macroadenoma (H) 08/2023                Past Surgical History:   Procedure Laterality Date     DENTAL SURGERY         wisdom     OPTICAL TRACKING SYSTEM ENDOSCOPIC ENDONASAL SURGERY Bilateral 9/21/2023     Procedure: stealth assisted Endoscopic endonasal transsellar transclival approach for tumor, nasoseptal flap;  Surgeon: Sabas Lacy MD;  Location:  OR         Allergies:  Allergies   No Known Allergies         Current Medications   Current Facility-Administered Medications         Current Outpatient Medications   Medication     acetaminophen (TYLENOL) 325 MG tablet     cetirizine (ZYRTEC) 10 MG tablet     desmopressin (DDAVP) 0.01 % spray     dexAMETHasone (DECADRON) 4 MG/ML injection     hydrocortisone (CORTEF) 5 MG tablet     hydrocortisone (CORTEF) 5 MG tablet     levothyroxine (SYNTHROID/LEVOTHROID) 75 MCG tablet     multivitamin w/minerals (MULTI-VITAMIN) tablet     oxyCODONE (ROXICODONE) 5 MG tablet     polyethylene glycol (MIRALAX) 17 GM/Dose powder     sodium chloride (OCEAN) 0.65 % nasal spray      No current facility-administered medications for this visit.            Family History:            Family History   Problem Relation Age of Onset     Anesthesia Reaction Father           PONV     Glaucoma No family hx of       Macular Degeneration No family hx of       Deep Vein Thrombosis (DVT) No family hx of           Social History:  Social History                Tobacco Use     Smoking status: Never     Smokeless  "tobacco: Never   Substance Use Topics     Alcohol use: Yes       Alcohol/week: 2.0 - 3.0 standard drinks of alcohol       Types: 2 - 3 Standard drinks or equivalent per week         ROS:  Full review of systems taken with the help of the intake sheet. Otherwise a complete 14 point review of systems was taken and is negative unless stated in the history above.        Physical Exam:   Vitals: Ht 1.803 m (5' 11\")   BMI 29.01 kg/m    BMI= Body mass index is 29.01 kg/m .   General: well appearing, no acute distress, pleasant and conversant,   Mental Status/neuro: alert and oriented  Face: symmetrical, normal facial color  Eyes: anicteric, no proptosis or lid lag  Resp: normally breathing        Labs : I reviewed data from epic and extract and summarize the pertinent data here.             Lab Results   Component Value Date      08/16/2023                Lab Results   Component Value Date     POTASSIUM 4.2 08/16/2023                Lab Results   Component Value Date     CHLORIDE 103 08/16/2023                Lab Results   Component Value Date     SOURAV 9.9 08/16/2023                Lab Results   Component Value Date     CO2 25 08/16/2023                Lab Results   Component Value Date     BUN 18.9 08/16/2023                Lab Results   Component Value Date     CR 1.07 08/16/2023                Lab Results   Component Value Date      08/16/2023                Lab Results   Component Value Date     TSH 2.39 08/16/2023                Lab Results   Component Value Date     T4 0.79 08/16/2023      No results found for: A1C     No results found for: IGF1  No results found for: LH            Lab Results   Component Value Date     FSH 4.8 08/16/2023      No results found for: ESTROGEN  No results found for: PROLACTIN           MRI Brain: I personally reviewed the original images and agree with the below reports.           Large lobulated T2 FLAIR intermediate (somewhat similar to gray matter  on T1 and T2) and " heterogeneously enhancing expansile mass centered on  the nonvisualized sella measuring 5.6 x 4.3 x 4.3 cm. Areas of marked  T2 hyperintense signal may indicated cystic or necrotic component.  This superiorly extends into suprasellar cistern and widens diaphragma  sella. There is associated diffusion restriction. Nonvisualization of  the pituitary gland and stalk. Marked mass effect upon the optic  chiasm which appears elevated.  This inferiorly this extends into the nasopharynx and involves the  majority of clivus stopping at the junction of the sphenoid bone with  the anterior occipital bone.  This anteriorly extends through the  sphenoid locules and into the posterior ethmoidal cells. Posteriorly  this extends into the prepontine cistern which is partially effaced.  There is invasion of the bilateral cavernous sinuses (best seen on  image 7 of series 11). No evidence of encasement of either cavernous  segment of the bilateral internal carotid arteries.     Regarding the orbits, no definite mass is noted of the globe, conal  structures, or within the orbital fat on either side. The optic nerves   04/07/25 11:11 AM  PATIENT LAB/IMAGING STATUS : Orders completed / No further action needed       Again, thank you for allowing me to participate in the care of your patient.      Sincerely,    Delilah Mcgrath MD

## 2025-04-23 NOTE — TELEPHONE ENCOUNTER
Left Voicemail (1st Attempt) for the patient to call back and schedule the following:    Appointment type: Return skull base  Provider: Dr Lacy  Return date: First available  Specialty phone number: [phone]  Additional appointment(s) needed: na  Additonal Notes: na

## 2025-04-23 NOTE — NURSING NOTE
Chief Complaint   Patient presents with    Pituitary Problem     Vital signs:      BP: 134/87 Pulse: 73     SpO2: 94 %     Height: 182.9 cm (6') Weight: 92.1 kg (203 lb)  Estimated body mass index is 27.53 kg/m  as calculated from the following:    Height as of this encounter: 1.829 m (6').    Weight as of this encounter: 92.1 kg (203 lb).      Yony states that he has felt numbness in his hands and feet this winter that he was seen for by Dr. Padilla.

## 2025-04-24 LAB — TESTOST SERPL-MCNC: 233 NG/DL (ref 240–950)

## 2025-05-05 LAB
INSULIN GROWTH FACTOR 1 (EXTERNAL): 62 NG/ML (ref 50–317)
INSULIN GROWTH FACTOR I SD SCORE (EXTERNAL): -1.6 SD

## 2025-06-04 NOTE — PROGRESS NOTES
[facility]  Department of Neurosurgery    Name: Yony Loera  MRN: [id number]  : 1975     Reason for visit: Pituitary adenoma (silent corticotroph) s/p EEA 2023, followup visit      Dear Dr. Castillo and Dr. Claire,    It was a pleasure to see Mr. Loera in the [facility] today in followup. As you recall, Mr. Loera is a pleasant 50 year-old right-handed male who noticed left peripheral vision loss in the left eye as well as central blurring and an eye doctor appointment in 2023.  He was referred to Dr. Claire who discovered a pituitary adenoma measuring 5.6 x 4.3cm. He underwent an EEA 2023.  He has been on hormone replacement with hydrocortisone 10/5, levothroxine, and ddavp at night. Postoperatively we have been tracking a slow recurrence along the pterygoids left more than right. He is otherwise doing well.    Pathology:  pituitary adenoma, tpit positive and patchy ACTH positivity    Review of Systems:   Pertinent items are noted in HPI or as in patient entered ROS below, remainder of complete ROS is negative.     Medications: cetirizine    Allergies: Patient has no known allergies.      Past Medical History: GERD    Family History: Ulcerative colitis in mother, Parkinson's in father, breast cancer in sister and grandmother    Social History: Works in [employer], lifetime non-smoker,  and one daughter.    Physical Exam:   General: No acute distress.   Head: No signs of trauma.    Eyes: Conjunctivae are normal.  Mouth/Throat: Oropharynx moist.  Neck: Normal range of motion.    Resp: No respiratory distress.   MSK: Moves all extremities.  No obvious deformity.  Neuro: The patient is fully oriented and quite pleasant. Speech normal.  Corrected visual acuity is 20/30 on the right and 20/25 on the left.  Visual fields are full to confrontation. Extraocular movements are intact without nystagmus. Facial  sensation is intact in V1, V2, V3 distributions. Facial nerve function is normal, rated as a House Brackmann 1, without synkinesis.  Palate is symmetric. Shoulders are full strength. Tongue is midline. There is no pronator drift. Full strength in all extremities. Sensation intact throughout. No dysmetria with finger-nose-finger testing.   Psych: Normal mood and affect. Behavior is normal.      Labs:  4/23/2025  FT4 0.97  IGF1 62    Imaging:  The MRI from 4/18/2025 was reviewed and compared to prior scans. There is a substantial reduction in the tumor size, with a slow recurrence along the left and right pterygoid bases of pituitary adenoma. No sellar/suprasellar tumor and optic nerves are unobstructed in normal position.    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 Given the silent corticotroph histology, we acknowledge that his tumor is a higher chance of recurrence and was large at initial presentation. Given his young age, I do believe removal of the adenoma towards the middle fossa on the left and right sides should be performed though not urgent.    We discussed the risks and benefits of further observation, radiation, medical management and additional surgical resection. I believe surgery is the best long term approach at this point though timing is not urgent. We again described the endonasal surgery to resect these tumors, perioperative expectations, and recovery period.  We discussed the risks including bleeding, infection, neurologic injury including vision loss/diplopia/anosmia, carotid injury/stroke, CSF leak, loss or changes in smell and taste, weakness, numbness, subtotal removal/recurrence, need for additional  procedures or operations.  He continues to follow with Dr. Mcgrath  They will discuss timing for redo endonasal surgery and let us know. As a backup plan, I would like to arrange an MRI in 1 year and appointment with me.  I encouraged Mr. Loera to contact with any questions or concerns or if we may be of assistance in any way.      It was a pleasure to participate in the care of your patient. Please feel free to contact me at any point if I can be of any assistance for Mr. Loera.    Sincerely,  Sabas Lacy MD       25 minutes spent on the date of the encounter doing chart review, review of outside records, review of test results, interpretation of tests, patient visit, documentation and discussion with other provider(s)

## 2025-06-11 ENCOUNTER — OFFICE VISIT (OUTPATIENT)
Dept: NEUROSURGERY | Facility: CLINIC | Age: 50
End: 2025-06-11
Payer: COMMERCIAL

## 2025-06-11 VITALS
SYSTOLIC BLOOD PRESSURE: 133 MMHG | HEART RATE: 66 BPM | BODY MASS INDEX: 27.28 KG/M2 | OXYGEN SATURATION: 99 % | HEIGHT: 72 IN | WEIGHT: 201.4 LBS | DIASTOLIC BLOOD PRESSURE: 85 MMHG | RESPIRATION RATE: 16 BRPM

## 2025-06-11 DIAGNOSIS — E23.6 PITUITARY MASS: Primary | ICD-10-CM

## 2025-06-11 NOTE — NURSING NOTE
Chief Complaint   Patient presents with    RECHECK     Here for a follow up, confirmed with patient     Catherine Townsend

## 2025-06-11 NOTE — LETTER
[facility]  [facility] NEUROSURGERY CLINIC [address]  [address]RD [address]LO[address]  [facility] [address]  Phone: [phone]  Fax: [phone]          2025    RE:   Yony Loera  [address] [facility] [address]      Dear Colleague,    Thank you for referring your patient, Yony Loera, to the [facility]. Please see a copy of my visit note below.      [facility]  Department of Neurosurgery    Name: Yony Loera  MRN: [id number]  : 1975     Reason for visit: Pituitary adenoma (silent corticotroph) s/p EEA 2023, followup visit      Dear Dr. Castillo and Dr. Claire,    It was a pleasure to see Mr. Loera in the [facility] today in followup. As you recall, Mr. Loera is a pleasant 50 year-old right-handed male who noticed left peripheral vision loss in the left eye as well as central blurring and an eye doctor appointment in 2023.  He was referred to Dr. Claire who discovered a pituitary adenoma measuring 5.6 x 4.3cm. He underwent an EEA 2023.  He has been on hormone replacement with hydrocortisone 10/, levothroxine, and ddavp at night. Postoperatively we have been tracking a slow recurrence along the pterygoids left more than right. He is otherwise doing well.    Pathology:  pituitary adenoma, tpit positive and patchy ACTH positivity    Review of Systems:   Pertinent items are noted in HPI or as in patient entered ROS below, remainder of complete ROS is negative.     Medications: cetirizine    Allergies: Patient has no known allergies.      Past Medical History: GERD    Family History: Ulcerative colitis in mother, Parkinson's in father, breast cancer in sister and grandmother    Social History: Works in , lifetime non-smoker,  and one daughter.    Physical Exam:   General: No acute  distress.   Head: No signs of trauma.    Eyes: Conjunctivae are normal.  Mouth/Throat: Oropharynx moist.  Neck: Normal range of motion.    Resp: No respiratory distress.   MSK: Moves all extremities.  No obvious deformity.  Neuro: The patient is fully oriented and quite pleasant. Speech normal.  Corrected visual acuity is 20/30 on the right and 20/25 on the left.  Visual fields are full to confrontation. Extraocular movements are intact without nystagmus. Facial sensation is intact in V1, V2, V3 distributions. Facial nerve function is normal, rated as a House Brackmann 1, without synkinesis.  Palate is symmetric. Shoulders are full strength. Tongue is midline. There is no pronator drift. Full strength in all extremities. Sensation intact throughout. No dysmetria with finger-nose-finger testing.   Psych: Normal mood and affect. Behavior is normal.      Labs:  4/23/2025  FT4 0.97  IGF1 62    Imaging:  The MRI from 4/18/2025 was reviewed and compared to prior scans. There is a substantial reduction in the tumor size, with a slow recurrence along the left and right pterygoid bases of pituitary adenoma. No sellar/suprasellar tumor and optic nerves are unobstructed in normal position.    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 Given the silent corticotroph histology, we acknowledge that his tumor is a higher chance of recurrence and was large at initial presentation. Given his young age, I do believe removal of the adenoma towards the middle fossa on the left and right sides should be performed though not urgent.    We discussed the risks and benefits of further observation, radiation, medical management and additional surgical resection.  I believe surgery is the best long term approach at this point though timing is not urgent. We again described the endonasal surgery to resect these tumors, perioperative expectations, and recovery period.  We discussed the risks including bleeding, infection, neurologic injury including vision loss/diplopia/anosmia, carotid injury/stroke, CSF leak, loss or changes in smell and taste, weakness, numbness, subtotal removal/recurrence, need for additional procedures or operations.  He continues to follow with Dr. Mcgrath  They will discuss timing for redo endonasal surgery and let us know. As a backup plan, I would like to arrange an MRI in 1 year and appointment with me.  I encouraged Mr. Loera to contact with any questions or concerns or if we may be of assistance in any way.      It was a pleasure to participate in the care of your patient. Please feel free to contact me at any point if I can be of any assistance for Mr. Loera.    Sincerely,  Sabas Lacy MD       25 minutes spent on the date of the encounter doing chart review, review of outside records, review of test results, interpretation of tests, patient visit, documentation and discussion with other provider(s)          Again, thank you for allowing me to participate in the care of your patient.      Sincerely,    Sabas Lacy MD

## 2025-06-11 NOTE — LETTER
2025       RE: Yony Loera  [address]  [facility] [address]     Dear Colleague,    Thank you for referring your patient, Yony Loera, to the [facility] [place] at [facility]. Please see a copy of my visit note below.    [facility]  Department of Neurosurgery    Name: Yony Loera  MRN: [id number]  : 1975     Reason for visit: Pituitary adenoma (silent corticotroph) s/p EEA 2023, followup visit      Dear Dr. Castillo and Dr. Claire,    It was a pleasure to see Mr. Loera in the [facility] today in followup. As you recall, Mr. Loera is a pleasant 50 year-old right-handed male who noticed left peripheral vision loss in the left eye as well as central blurring and an eye doctor appointment in 2023.  He was referred to Dr. Claire who discovered a pituitary adenoma measuring 5.6 x 4.3cm. He underwent an EEA 2023.  He has been on hormone replacement with hydrocortisone 10/, levothroxine, and ddavp at night. Postoperatively we have been tracking a slow recurrence along the pterygoids left more than right. He is otherwise doing well.    Pathology:  pituitary adenoma, tpit positive and patchy ACTH positivity    Review of Systems:   Pertinent items are noted in HPI or as in patient entered ROS below, remainder of complete ROS is negative.     Medications: cetirizine    Allergies: Patient has no known allergies.      Past Medical History: GERD    Family History: Ulcerative colitis in mother, Parkinson's in father, breast cancer in sister and grandmother    Social History: Works in [employer], lifetime non-smoker,  and one daughter.    Physical Exam:   General: No acute distress.   Head: No signs of trauma.    Eyes: Conjunctivae are normal.  Mouth/Throat: Oropharynx moist.  Neck: Normal range of motion.    Resp: No  respiratory distress.   MSK: Moves all extremities.  No obvious deformity.  Neuro: The patient is fully oriented and quite pleasant. Speech normal.  Corrected visual acuity is 20/30 on the right and 20/25 on the left.  Visual fields are full to confrontation. Extraocular movements are intact without nystagmus. Facial sensation is intact in V1, V2, V3 distributions. Facial nerve function is normal, rated as a House Brackmann 1, without synkinesis.  Palate is symmetric. Shoulders are full strength. Tongue is midline. There is no pronator drift. Full strength in all extremities. Sensation intact throughout. No dysmetria with finger-nose-finger testing.   Psych: Normal mood and affect. Behavior is normal.      Labs:  4/23/2025  FT4 0.97  IGF1 62    Imaging:  The MRI from 4/18/2025 was reviewed and compared to prior scans. There is a substantial reduction in the tumor size, with a slow recurrence along the left and right pterygoid bases of pituitary adenoma. No sellar/suprasellar tumor and optic nerves are unobstructed in normal position.    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 Given the silent corticotroph histology, we acknowledge that his tumor is a higher chance of recurrence and was large at initial presentation. Given his young age, I do believe removal of the adenoma towards the middle fossa on the left and right sides should be performed though not urgent.    We discussed the risks and benefits of further observation, radiation, medical management and additional surgical resection. I believe surgery is the best long term approach at this point though timing is not urgent. We again described the endonasal surgery to resect  these tumors, perioperative expectations, and recovery period.  We discussed the risks including bleeding, infection, neurologic injury including vision loss/diplopia/anosmia, carotid injury/stroke, CSF leak, loss or changes in smell and taste, weakness, numbness, subtotal removal/recurrence, need for additional procedures or operations.  He continues to follow with Dr. Mcgrath  They will discuss timing for redo endonasal surgery and let us know. As a backup plan, I would like to arrange an MRI in 1 year and appointment with me.  I encouraged Mr. Loera to contact with any questions or concerns or if we may be of assistance in any way.      It was a pleasure to participate in the care of your patient. Please feel free to contact me at any point if I can be of any assistance for Mr. Loera.    Sincerely,  Sabas Lacy MD       25 minutes spent on the date of the encounter doing chart review, review of outside records, review of test results, interpretation of tests, patient visit, documentation and discussion with other provider(s)        Again, thank you for allowing me to participate in the care of your patient.      Sincerely,    Sabas Lacy MD

## 2025-06-11 NOTE — PATIENT INSTRUCTIONS
You were seen today with Dr. Sabas Lacy.    Next steps:  Decide about surgery  If you decide to not proceed with surgery: Return to clinic in 1 year for follow up with Dr. Lacy  Please complete MRI prior to clinic visit.       How to Contact Us  Send a [employer] message to your provider.   Call the clinic - your call will be routed appropriately.   Neurosurgery Clinic: [phone]  To speak directly to an RN Care Coordinator:  Rosemary RN: [phone]  Lou RN: [phone]    Note: We do our best to check voicemail frequently throughout the day and make every effort to return calls within 1-2 business days. For urgent matters, please use the general clinic phone numbers listed above.

## 2025-07-26 ENCOUNTER — HEALTH MAINTENANCE LETTER (OUTPATIENT)
Age: 50
End: 2025-07-26

## 2025-08-27 ENCOUNTER — PREP FOR PROCEDURE (OUTPATIENT)
Dept: NEUROLOGY | Facility: CLINIC | Age: 50
End: 2025-08-27
Payer: COMMERCIAL

## 2025-08-27 DIAGNOSIS — D35.2 PITUITARY ADENOMA (H): Primary | ICD-10-CM

## 2025-09-03 ENCOUNTER — TELEPHONE (OUTPATIENT)
Dept: NEUROSURGERY | Facility: CLINIC | Age: 50
End: 2025-09-03
Payer: COMMERCIAL

## 2025-09-04 ENCOUNTER — OFFICE VISIT (OUTPATIENT)
Dept: OTOLARYNGOLOGY | Facility: CLINIC | Age: 50
End: 2025-09-04
Payer: COMMERCIAL

## 2025-09-04 VITALS — WEIGHT: 200 LBS | BODY MASS INDEX: 27.09 KG/M2 | HEIGHT: 72 IN

## 2025-09-04 ASSESSMENT — PAIN SCALES - GENERAL: PAINLEVEL_OUTOF10: NO PAIN (0)

## (undated) DEVICE — BLADE SHAVER SERRATED 4MM ROTATE 1884002HRE

## (undated) DEVICE — DRAPE CORETEMP FLUID WARM SYSTEM 62X56IN CTD200

## (undated) DEVICE — OINTMENT ANTIBIOTIC BACITRACIN ZINC .9 G 1171

## (undated) DEVICE — SYR 03ML LL W/O NDL 309657

## (undated) DEVICE — TUBING STRYKER IRRIGATION CASSETTE 5400-050-001

## (undated) DEVICE — PIN SKULL MAYFIELD ADULT TITANIUM 3/PK A1120

## (undated) DEVICE — DRAPE STOCKINETTE IMPERVIOUS 10" 21048

## (undated) DEVICE — SPLINT NASAL DOYLE BREATHEASY 20-10500

## (undated) DEVICE — DRSG GAUZE 4X4" TRAY 6939

## (undated) DEVICE — PREP CHLORAPREP CLEAR 3ML 930400

## (undated) DEVICE — PACK NEURO MINOR UMMC SNE32MNMU4

## (undated) DEVICE — GLOVE BIOGEL PI MICRO SZ 7.0 48570

## (undated) DEVICE — ESU GROUND PAD ADULT W/CORD E7507

## (undated) DEVICE — MARKER SPHERES PASSIVE MEDT PACK 5 8801075

## (undated) DEVICE — LINEN TOWEL PACK X6 WHITE 5487

## (undated) DEVICE — NDL COUNTER 20CT 31142493

## (undated) DEVICE — SPONGE COTTONOID 1/2X1/2" 80-1400

## (undated) DEVICE — DRAPE POUCH INSTRUMENT 1018

## (undated) DEVICE — SYR 10ML LL W/O NDL 302995

## (undated) DEVICE — MANOMETER VENOUS PRESSURE W/4-WAY STOPCOCK 35ML MX441

## (undated) DEVICE — NDL 25GA 2"  8881200441

## (undated) DEVICE — ESU ELEC NDL 6" COATED/INSULATED E1465-6

## (undated) DEVICE — SYR 10ML FINGER CONTROL W/O NDL 309695

## (undated) DEVICE — ENDO SHEATH STORZ SHARPSITE ENDOSCRUB 0DEG 4MM 1912000

## (undated) DEVICE — ENDO INSERT ESU BIPOLAR FCP STORZ VERTICAL CLOSING 28164FGL

## (undated) DEVICE — ESU PENCIL W/COATED BLADE E2450H

## (undated) DEVICE — BLADE FEATHER MIZUHO K-6400

## (undated) DEVICE — BUR STRK ROUND DIAMOND 4.0MM COARSE 8450-012-040DC

## (undated) DEVICE — SOL NACL 0.9% 10ML VIAL 0409-4888-02

## (undated) DEVICE — DECANTER BAG 2002S

## (undated) DEVICE — PROTECTOR ARM ONE-STEP TRENDELENBURG 40418

## (undated) DEVICE — NDL BLUNT 18GA 1" W/O FILTER 305181

## (undated) DEVICE — LINEN TOWEL PACK X30 5481

## (undated) DEVICE — SPONGE SURGIFOAM 100 1974

## (undated) DEVICE — SPONGE COTTONOID NEURO 1/2"X1/2" 30-054

## (undated) DEVICE — ESU ELEC NDL 6" E1552-6

## (undated) DEVICE — PACK GOWN 3/PK DISP XL SBA32GPFCB

## (undated) DEVICE — DRAPE IOBAN INCISE 13X13" 6640EZ

## (undated) DEVICE — SOL RINGERS LACTATED 500ML BAG 2B2323QA

## (undated) DEVICE — SYR 30ML LL W/O NDL 302832

## (undated) DEVICE — GUIDE MED SPIWAY SRG ENSL

## (undated) DEVICE — DRSG TELFA 3X8" 1238

## (undated) DEVICE — EYE PREP BETADINE 5% SOLUTION 30ML 0065-0411-30

## (undated) DEVICE — SPONGE COTTONOID 1/2X3" 80-1407

## (undated) DEVICE — DRAPE MAYO STAND 23X54 8337

## (undated) DEVICE — STRAP UNIVERSAL POSITIONING 2-PIECE 4X47X76" 91-287

## (undated) DEVICE — PREP CHLORAPREP 26ML TINTED HI-LITE ORANGE 930815

## (undated) DEVICE — SUCTION MANIFOLD NEPTUNE 2 SYS 4 PORT 0702-020-000

## (undated) DEVICE — DRSG TEGADERM 2 3/8X2 3/4" 1624W

## (undated) DEVICE — SLEEVE IRRIGATION MIS 13.0CM 5/PKG 5407-010-950

## (undated) DEVICE — SOL RINGERS LACTATED 1000ML BAG 07953-09

## (undated) DEVICE — SOL WATER IRRIG 1000ML BOTTLE 2F7114

## (undated) DEVICE — DRAPE EYE SHEET 8441

## (undated) DEVICE — SU CHROMIC 4-0 SH 27" G121H

## (undated) DEVICE — APPLICATOR EXTENDED TIP DURASEAL 8CM 205108

## (undated) DEVICE — CATH TRAY FOLEY SURESTEP 16FR W/TMP PRB STLK LATEX A319416AM

## (undated) DEVICE — ESU MINI EPIDURAL VEIN SEALER AQUAMANTIS 3.4MM 23-314-1

## (undated) DEVICE — SURGICEL HEMOSTAT 4X8" 1952

## (undated) DEVICE — SU ETHILON 3-0 PS-1 18" 1663H

## (undated) DEVICE — COVER CAMERA VIDEO LASER 9X96" 04-CC227

## (undated) DEVICE — SPONGE SURGIFOAM 01GM POWDER 1978

## (undated) DEVICE — IOM SUPPLIES/CASE FEE

## (undated) DEVICE — SYRINGE EAR/ULCER STERILE 2 OZ BULB 4172

## (undated) RX ORDER — HYDROMORPHONE HCL IN WATER/PF 6 MG/30 ML
PATIENT CONTROLLED ANALGESIA SYRINGE INTRAVENOUS
Status: DISPENSED
Start: 2023-09-21

## (undated) RX ORDER — FENTANYL CITRATE 50 UG/ML
INJECTION, SOLUTION INTRAMUSCULAR; INTRAVENOUS
Status: DISPENSED
Start: 2023-09-21

## (undated) RX ORDER — ACETAMINOPHEN 325 MG/1
TABLET ORAL
Status: DISPENSED
Start: 2023-09-21

## (undated) RX ORDER — OXYMETAZOLINE HYDROCHLORIDE 0.05 G/100ML
SPRAY NASAL
Status: DISPENSED
Start: 2023-09-21

## (undated) RX ORDER — ESMOLOL HYDROCHLORIDE 10 MG/ML
INJECTION INTRAVENOUS
Status: DISPENSED
Start: 2023-09-21

## (undated) RX ORDER — EPINEPHRINE NASAL SOLUTION 1 MG/ML
SOLUTION NASAL
Status: DISPENSED
Start: 2023-09-21

## (undated) RX ORDER — GABAPENTIN 300 MG/1
CAPSULE ORAL
Status: DISPENSED
Start: 2023-09-21

## (undated) RX ORDER — OXYCODONE HYDROCHLORIDE 5 MG/1
TABLET ORAL
Status: DISPENSED
Start: 2023-09-21

## (undated) RX ORDER — CEFAZOLIN SODIUM 1 G/3ML
INJECTION, POWDER, FOR SOLUTION INTRAMUSCULAR; INTRAVENOUS
Status: DISPENSED
Start: 2023-09-21